# Patient Record
Sex: FEMALE | Race: WHITE | NOT HISPANIC OR LATINO | Employment: UNEMPLOYED | URBAN - METROPOLITAN AREA
[De-identification: names, ages, dates, MRNs, and addresses within clinical notes are randomized per-mention and may not be internally consistent; named-entity substitution may affect disease eponyms.]

---

## 2017-01-13 ENCOUNTER — APPOINTMENT (OUTPATIENT)
Dept: WOMENS IMAGING | Facility: HOSPITAL | Age: 57
End: 2017-01-13

## 2017-01-13 PROCEDURE — 77063 BREAST TOMOSYNTHESIS BI: CPT | Performed by: RADIOLOGY

## 2017-01-13 PROCEDURE — 77067 SCR MAMMO BI INCL CAD: CPT | Performed by: RADIOLOGY

## 2017-05-17 ENCOUNTER — CLINICAL SUPPORT NO REQUIREMENTS (OUTPATIENT)
Dept: CARDIOLOGY | Facility: CLINIC | Age: 57
End: 2017-05-17

## 2017-05-17 DIAGNOSIS — R00.1 SINUS BRADYCARDIA: Primary | ICD-10-CM

## 2017-05-17 PROCEDURE — 93294 REM INTERROG EVL PM/LDLS PM: CPT | Performed by: INTERNAL MEDICINE

## 2017-05-17 PROCEDURE — 93296 REM INTERROG EVL PM/IDS: CPT | Performed by: INTERNAL MEDICINE

## 2017-09-22 ENCOUNTER — OFFICE VISIT (OUTPATIENT)
Dept: CARDIOLOGY | Facility: CLINIC | Age: 57
End: 2017-09-22

## 2017-09-22 VITALS
SYSTOLIC BLOOD PRESSURE: 100 MMHG | WEIGHT: 135 LBS | HEART RATE: 72 BPM | DIASTOLIC BLOOD PRESSURE: 78 MMHG | BODY MASS INDEX: 21.19 KG/M2 | HEIGHT: 67 IN

## 2017-09-22 DIAGNOSIS — R07.9 CHEST PAIN, UNSPECIFIED TYPE: Primary | ICD-10-CM

## 2017-09-22 DIAGNOSIS — I10 ESSENTIAL HYPERTENSION: Chronic | ICD-10-CM

## 2017-09-22 DIAGNOSIS — R00.1 BRADYCARDIA: ICD-10-CM

## 2017-09-22 DIAGNOSIS — I47.1 SVT (SUPRAVENTRICULAR TACHYCARDIA) (HCC): ICD-10-CM

## 2017-09-22 PROCEDURE — 93288 INTERROG EVL PM/LDLS PM IP: CPT | Performed by: INTERNAL MEDICINE

## 2017-09-22 PROCEDURE — 99213 OFFICE O/P EST LOW 20 MIN: CPT | Performed by: INTERNAL MEDICINE

## 2017-09-22 RX ORDER — BUSPIRONE HYDROCHLORIDE 10 MG/1
10 TABLET ORAL 3 TIMES DAILY PRN
COMMUNITY

## 2017-09-22 RX ORDER — PROMETHAZINE HYDROCHLORIDE 25 MG/1
25 TABLET ORAL 2 TIMES DAILY PRN
Status: ON HOLD | COMMUNITY
End: 2020-04-01

## 2017-09-22 RX ORDER — GABAPENTIN 300 MG/1
300 CAPSULE ORAL 3 TIMES DAILY
COMMUNITY

## 2017-09-22 NOTE — PROGRESS NOTES
Lian Pearl  1960  [phone]  [phone]    09/22/2017    [facility] CARDIOLOGY     Jessi Epsteinraj Rowell DO  [address] [place] [id number]    Chief Complaint   Patient presents with   • Rapid Heart Rate       Problem List:    PROBLEM LIST:  1.  Tachypalpitations:  a. Status post EP study and catheter ablation, 09/18/2014  for typical atrial flutter and typical AV gisella reentrant tachycardia.    b. 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  c. Initiation of flecainide and  metoprolol therapy.  d. Normal LV function by recent cardiac stress test.   2.  Apparent reported symptomatic bradycardia on metoprolol therapy/status post dual chamber [place] permanent pacemaker implantation by Dr. Michael Mcknight in 2010.  3. Atypical chest pain:  a. Left heart catheterization 2005 revealing no significant coronary artery disease.  b. Left heart catheterization 2010 revealing normal coronaries.  EF 65%.  c. Cardiolite  stress test 2013 revealing thick small apical wall defect, normal LV function, EF 61%.   4.  Chronic migraine headaches/Topamax therapy.   5. History of pituitary adenoma/bromocriptine therapy.   6. Insomnia.   7. Diabetes.   8. Hypertension.   9. GERD.  10. Dyslipidemia.   11. Depression/anxiety.  12. Moderate obesity.   13. Sleep apnea with home  CPAP.   14. History of valvular heart disease.  a. Echocardiogram 2013 revealing  EF 60%. Mild left atrial enlargement, trace AI, trace MR, mild TR.     Allergies  No Known Allergies    Current Medications    Current Outpatient Prescriptions:   •  bromocriptine (PARLODEL) 5 MG capsule, Take 5 mg by mouth daily., Disp: , Rfl:   •  busPIRone (BUSPAR) 5 MG tablet, Take 5 mg by mouth 3 (Three) Times a Day., Disp: , Rfl:   •  clonazePAM (KlonoPIN) 0.5 MG tablet, Take 0.5 mg by mouth 2 (two) times a day as needed for seizures.,  "Disp: , Rfl:   •  furosemide (LASIX) 20 MG tablet, Take 20 mg by mouth daily., Disp: , Rfl:   •  gabapentin (NEURONTIN) 300 MG capsule, Take 300 mg by mouth 3 (Three) Times a Day., Disp: , Rfl:   •  isosorbide mononitrate (IMDUR) 30 MG 24 hr tablet, Take 30 mg by mouth daily., Disp: , Rfl:   •  lisinopril (PRINIVIL,ZESTRIL) 5 MG tablet, Take 5 mg by mouth daily., Disp: , Rfl:   •  metFORMIN (GLUCOPHAGE) 500 MG tablet, Take 500 mg by mouth 2 (two) times a day with meals., Disp: , Rfl:   •  metoprolol succinate XL (TOPROL-XL) 25 MG 24 hr tablet, Take 50 mg by mouth daily., Disp: , Rfl:   •  pravastatin (PRAVACHOL) 40 MG tablet, Take 40 mg by mouth daily., Disp: , Rfl:   •  promethazine (PHENERGAN) 25 MG tablet, Take 25 mg by mouth 2 (Two) Times a Day As Needed for Nausea or Vomiting., Disp: , Rfl:   •  ranitidine (ZANTAC) 150 MG tablet, Take 150 mg by mouth daily., Disp: , Rfl:   •  Topiramate  MG capsule extended-release 24 hour sprinkle, Take 150 mg by mouth 2 (two) times a day., Disp: , Rfl:     History of Present Illness   HPI    Pt presents for follow up of SVT/Bradycardia. Since the pt has seen us in clinic last, pt denies any syncope, LH, and dizziness. Denies any hospitalizations, ER visits, bleeding, or TIA/CVA symptoms. Overall feels ok. + rare palps +CP worse lasts minutes throbbing pain assoc with SOB    ROS:  General:  + fatigue, + wt loss being evaluated by [facility]  Cardiovascular:  Denies CP, PND, syncope, near syncope, edema or palpitations.  Pulmonary:  Denies MCGRAW, cough, or wheezing    Vitals:    09/22/17 1129   BP: 100/78   BP Location: Left arm   Patient Position: Sitting   Pulse: 72   Weight: 135 lb (61.2 kg)   Height: 67\" (170.2 cm)       PE:  General: NAD  Neck: no JVD, no carotid bruits, no TM  Heart RRR, NL S1, S2, S4 present, no rubs, murmurs  Lungs: CTA, no wheezes, rhonchi, or rales  Abd: soft, non-tender, NL BS  Ext: No musculoskeletal deformities, no edema, cyanosis, or " clubbing  Psych: normal mood and affect    Diagnostic Data:  Procedures      1. Chest pain, unspecified type    2. Bradycardia    3. SVT (supraventricular tachycardia)    4. Essential hypertension        PM Interrogation: NL PM fxn, Nl battery fxn, No AF     Plan:  1) CP: f/up w Dr Faustin for possible Cardiolyte  2) Bradycardia  Continue present medications. NL pacemaker function.  3) HTN  Wt loss, exercise, salt reduction    F/up in 12 months

## 2018-03-28 ENCOUNTER — CLINICAL SUPPORT NO REQUIREMENTS (OUTPATIENT)
Dept: CARDIOLOGY | Facility: CLINIC | Age: 58
End: 2018-03-28

## 2018-03-28 DIAGNOSIS — R00.1 SINUS BRADYCARDIA: ICD-10-CM

## 2018-03-28 PROCEDURE — 93296 REM INTERROG EVL PM/IDS: CPT | Performed by: INTERNAL MEDICINE

## 2018-03-28 PROCEDURE — 93294 REM INTERROG EVL PM/LDLS PM: CPT | Performed by: INTERNAL MEDICINE

## 2018-11-16 ENCOUNTER — OFFICE VISIT (OUTPATIENT)
Dept: CARDIOLOGY | Facility: CLINIC | Age: 58
End: 2018-11-16

## 2018-11-16 VITALS — SYSTOLIC BLOOD PRESSURE: 118 MMHG | OXYGEN SATURATION: 95 % | DIASTOLIC BLOOD PRESSURE: 70 MMHG | HEART RATE: 92 BPM

## 2018-11-16 DIAGNOSIS — R00.1 SINUS BRADYCARDIA: Primary | ICD-10-CM

## 2018-11-16 PROCEDURE — 93280 PM DEVICE PROGR EVAL DUAL: CPT | Performed by: INTERNAL MEDICINE

## 2018-11-16 PROCEDURE — 99213 OFFICE O/P EST LOW 20 MIN: CPT | Performed by: INTERNAL MEDICINE

## 2018-11-16 NOTE — PROGRESS NOTES
Lian Kang  1960  PCP: Jessi Melvin DO    SUBJECTIVE:   Lian Kang is a 58 y.o. female seen for a follow up visit regarding the following:     Chief Complaint: Follow up for bradycardia    HPI:    Since last visit the patient's status has been stable. No further CP or SOB. Hr Stable    History:       Cardiac PMH: (Old records have been reviewed and summarized below)  1.  Tachypalpitations:  a. Status post EP study and catheter ablation, 09/18/2014  for typical atrial flutter and typical AV gisella reentrant tachycardia.    b. 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  c. Initiation of flecainide and  metoprolol therapy.  d. Normal LV function by recent cardiac stress test.   2.  Apparent reported symptomatic bradycardia on metoprolol therapy/status post dual chamber [place] permanent pacemaker implantation by Dr. Michael Mcknight in 2010.  3. Atypical chest pain:  a. Left heart catheterization 2005 revealing no significant coronary artery disease.  b. Left heart catheterization 2010 revealing normal coronaries.  EF 65%.  c. Cardiolite  stress test 2013 revealing thick small apical wall defect, normal LV function, EF 61%.   4.  Chronic migraine headaches/Topamax therapy.   5. History of pituitary adenoma/bromocriptine therapy.   6. Insomnia.   7. Diabetes.   8. Hypertension.   9. GERD.  10. Dyslipidemia.   11. Depression/anxiety.  12. Moderate obesity.   13. Sleep apnea with home  CPAP.   14. History of valvular heart disease.  a. Echocardiogram 2013 revealing  EF 60%. Mild left atrial enlargement, trace AI, trace MR, mild TR.          Current Outpatient Medications:   •  bromocriptine (PARLODEL) 5 MG capsule, Take 5 mg by mouth daily., Disp: , Rfl:   •  busPIRone (BUSPAR) 5 MG tablet, Take 5 mg by mouth 3 (Three) Times a Day., Disp: , Rfl:   •  clonazePAM (KlonoPIN) 0.5 MG tablet, Take 0.5 mg by  mouth 2 (two) times a day as needed for seizures., Disp: , Rfl:   •  furosemide (LASIX) 20 MG tablet, Take 20 mg by mouth daily., Disp: , Rfl:   •  gabapentin (NEURONTIN) 300 MG capsule, Take 300 mg by mouth 3 (Three) Times a Day., Disp: , Rfl:   •  isosorbide mononitrate (IMDUR) 30 MG 24 hr tablet, Take 30 mg by mouth daily., Disp: , Rfl:   •  lisinopril (PRINIVIL,ZESTRIL) 5 MG tablet, Take 5 mg by mouth daily., Disp: , Rfl:   •  metFORMIN (GLUCOPHAGE) 500 MG tablet, Take 500 mg by mouth Daily With Breakfast., Disp: , Rfl:   •  metoprolol succinate XL (TOPROL-XL) 25 MG 24 hr tablet, Take 50 mg by mouth daily., Disp: , Rfl:   •  pravastatin (PRAVACHOL) 40 MG tablet, Take 40 mg by mouth daily., Disp: , Rfl:   •  promethazine (PHENERGAN) 25 MG tablet, Take 25 mg by mouth 2 (Two) Times a Day As Needed for Nausea or Vomiting., Disp: , Rfl:   •  ranitidine (ZANTAC) 150 MG tablet, Take 150 mg by mouth daily., Disp: , Rfl:   •  Topiramate  MG capsule extended-release 24 hour sprinkle, Take 150 mg by mouth 2 (two) times a day., Disp: , Rfl:     Past Medical History, Past Surgical History, Family history, Social History, and Medications were all reviewed with the patient today and updated as necessary.       Patient Active Problem List   Diagnosis   • SVT (supraventricular tachycardia) (CMS/HCC)   • Sinus bradycardia   • H/O valvular heart disease   • AMANDEEP on CPAP   • Moderate obesity   • Anxiety and depression   • Dyslipidemia   • GERD (gastroesophageal reflux disease)   • Hypertension   • Diabetes mellitus (CMS/HCC)   • Insomnia   • Migraine headache   • Atypical chest pain     No Known Allergies  Past Medical History:   Diagnosis Date   • Anxiety and depression    • Atypical chest pain    • Diabetes mellitus (CMS/HCC)    • Dyslipidemia    • GERD (gastroesophageal reflux disease)    • H/O valvular heart disease     Echocardiogram 2013 revealing EF 60%. Mild left atrial enlargement, trace AI, trace MR, mild TR.   •  Hypertension    • Insomnia    • Migraine headache     Chronic migraine headaches/Topamax therapy.    • Moderate obesity    • AMANDEEP on CPAP    • Pituitary adenoma (CMS/HCC)     History of pituitary adenoma/bromocriptine therapy.    • Sinus bradycardia     Severe symptomatic bradycardia secondary to sinus node dysfunction and sinus bradycardia with pacemaker at elective replacement indicator.   • SVT (supraventricular tachycardia) (CMS/HCC)     With prior ablation.     Past Surgical History:   Procedure Laterality Date   • PACEMAKER IMPLANTATION  2010    Apparent reported symptomatic bradycardia on metoprolol therapy/status post dual chamber [place] permanent pacemaker implantation by Dr. Michael Mcknight in 2010.     History reviewed. No pertinent family history.  Social History     Tobacco Use   • Smoking status: Never Smoker   Substance Use Topics   • Alcohol use: No         PHYSICAL EXAM:    /70 (BP Location: Right arm, Patient Position: Sitting)   Pulse 92   SpO2 95%        Wt Readings from Last 5 Encounters:   09/22/17 61.2 kg (135 lb)   09/16/16 77.1 kg (170 lb)       BP Readings from Last 5 Encounters:   11/16/18 118/70   09/22/17 100/78   09/16/16 123/75       General-Well Nourished, Well developed  Eyes - PERRLA  Neck- supple, No mass  CV- regular rate and rhythm, no MRG, No edema  Lung- clear bilaterally  Abd- soft, +BS  Musc/skel - Norm strength and range of motion  Skin- warm and dry  Neuro - Alert & Oriented x 3, appropriate mood.        Medical problems and test results were reviewed with the patient today.     No results found for this or any previous visit (from the past 672 hour(s)).      EKG: (EKG has been independently visualized by me and summarized below)    Procedures     ASSESSMENT and PLAN  1.  Bradycardia - resolved post pacemaker  2.  Pacemaker-stable function        Return in about 1 year (around 11/16/2019) for office visit and device check with [place].        Gurdeep Borja,  M.D., GEOVANY, F.H.DEOS.  Cardiology/Electrophysiology  11/16/2018  11:36 AM

## 2019-01-15 ENCOUNTER — CLINICAL SUPPORT NO REQUIREMENTS (OUTPATIENT)
Dept: CARDIOLOGY | Facility: CLINIC | Age: 59
End: 2019-01-15

## 2019-01-15 DIAGNOSIS — R00.1 SINUS BRADYCARDIA: ICD-10-CM

## 2019-01-15 PROCEDURE — 93294 REM INTERROG EVL PM/LDLS PM: CPT | Performed by: INTERNAL MEDICINE

## 2019-01-15 PROCEDURE — 93296 REM INTERROG EVL PM/IDS: CPT | Performed by: INTERNAL MEDICINE

## 2019-04-16 ENCOUNTER — CLINICAL SUPPORT NO REQUIREMENTS (OUTPATIENT)
Dept: CARDIOLOGY | Facility: CLINIC | Age: 59
End: 2019-04-16

## 2019-04-16 DIAGNOSIS — R00.1 SINUS BRADYCARDIA: ICD-10-CM

## 2019-04-16 PROCEDURE — 93296 REM INTERROG EVL PM/IDS: CPT | Performed by: INTERNAL MEDICINE

## 2019-04-16 PROCEDURE — 93294 REM INTERROG EVL PM/LDLS PM: CPT | Performed by: INTERNAL MEDICINE

## 2019-07-16 ENCOUNTER — CLINICAL SUPPORT NO REQUIREMENTS (OUTPATIENT)
Dept: CARDIOLOGY | Facility: CLINIC | Age: 59
End: 2019-07-16

## 2019-07-16 DIAGNOSIS — R00.1 SINUS BRADYCARDIA: Primary | ICD-10-CM

## 2019-07-16 PROCEDURE — 93296 REM INTERROG EVL PM/IDS: CPT | Performed by: INTERNAL MEDICINE

## 2019-07-16 PROCEDURE — 93294 REM INTERROG EVL PM/LDLS PM: CPT | Performed by: INTERNAL MEDICINE

## 2019-11-15 ENCOUNTER — OFFICE VISIT (OUTPATIENT)
Dept: CARDIOLOGY | Facility: CLINIC | Age: 59
End: 2019-11-15

## 2019-11-15 VITALS
HEART RATE: 76 BPM | BODY MASS INDEX: 28.25 KG/M2 | SYSTOLIC BLOOD PRESSURE: 86 MMHG | HEIGHT: 67 IN | WEIGHT: 180 LBS | DIASTOLIC BLOOD PRESSURE: 60 MMHG

## 2019-11-15 DIAGNOSIS — I10 ESSENTIAL HYPERTENSION: Chronic | ICD-10-CM

## 2019-11-15 DIAGNOSIS — R00.1 SINUS BRADYCARDIA: Primary | ICD-10-CM

## 2019-11-15 PROCEDURE — 99214 OFFICE O/P EST MOD 30 MIN: CPT | Performed by: INTERNAL MEDICINE

## 2019-11-15 RX ORDER — HYDROCODONE BITARTRATE AND ACETAMINOPHEN 5; 325 MG/1; MG/1
1 TABLET ORAL EVERY 6 HOURS PRN
COMMUNITY

## 2019-11-15 NOTE — PROGRESS NOTES
Lian Kang  1960  PCP: Jessi Melvin DO    SUBJECTIVE:   Lian Kang is a 59 y.o. female seen for a follow up visit regarding the following:     Chief Complaint: Follow up for bradycardia    HPI:    Since last visit the patient's status has been stable. No further CP or SOB. Poor HR response. Some dizziness with hypotension    History:       Cardiac PMH: (Old records have been reviewed and summarized below)  1.  Tachypalpitations:  a. Status post EP study and catheter ablation, 09/18/2014  for typical atrial flutter and typical AV gisella reentrant tachycardia.    b. 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  c. Initiation of flecainide and  metoprolol therapy.  d. Normal LV function by recent cardiac stress test.   2.  Apparent reported symptomatic bradycardia on metoprolol therapy/status post dual chamber [place] permanent pacemaker implantation by Dr. Michael Mcknight in 2010.  3. Atypical chest pain:  a. Left heart catheterization 2005 revealing no significant coronary artery disease.  b. Left heart catheterization 2010 revealing normal coronaries.  EF 65%.  c. Cardiolite  stress test 2013 revealing thick small apical wall defect, normal LV function, EF 61%.   4.  Chronic migraine headaches/Topamax therapy.   5. History of pituitary adenoma/bromocriptine therapy.   6. Insomnia.   7. Diabetes.   8. Hypertension.   9. GERD.  10. Dyslipidemia.   11. Depression/anxiety.  12. Moderate obesity.   13. Sleep apnea with home  CPAP.   14. History of valvular heart disease.  a. Echocardiogram 2013 revealing  EF 60%. Mild left atrial enlargement, trace AI, trace MR, mild TR.          Current Outpatient Medications:   •  busPIRone (BUSPAR) 5 MG tablet, Take 5 mg by mouth 3 (Three) Times a Day., Disp: , Rfl:   •  clonazePAM (KlonoPIN) 0.5 MG tablet, Take 0.5 mg by mouth 2 (two) times a day as needed for  seizures., Disp: , Rfl:   •  furosemide (LASIX) 20 MG tablet, Take 20 mg by mouth daily., Disp: , Rfl:   •  gabapentin (NEURONTIN) 300 MG capsule, Take 300 mg by mouth 3 (Three) Times a Day., Disp: , Rfl:   •  HYDROcodone-acetaminophen (NORCO) 5-325 MG per tablet, Take 1 tablet by mouth Every 6 (Six) Hours As Needed., Disp: , Rfl:   •  isosorbide mononitrate (IMDUR) 30 MG 24 hr tablet, Take 30 mg by mouth daily., Disp: , Rfl:   •  lisinopril (PRINIVIL,ZESTRIL) 5 MG tablet, Take 5 mg by mouth daily., Disp: , Rfl:   •  metFORMIN (GLUCOPHAGE) 500 MG tablet, Take 500 mg by mouth Daily With Breakfast., Disp: , Rfl:   •  metoprolol succinate XL (TOPROL-XL) 50 MG 24 hr tablet, Take 50 mg by mouth Daily., Disp: , Rfl:   •  pravastatin (PRAVACHOL) 40 MG tablet, Take 40 mg by mouth daily., Disp: , Rfl:   •  promethazine (PHENERGAN) 25 MG tablet, Take 25 mg by mouth 2 (Two) Times a Day As Needed for Nausea or Vomiting., Disp: , Rfl:   •  Topiramate  MG capsule extended-release 24 hour sprinkle, Take 150 mg by mouth 2 (two) times a day., Disp: , Rfl:     Past Medical History, Past Surgical History, Family history, Social History, and Medications were all reviewed with the patient today and updated as necessary.       Patient Active Problem List   Diagnosis   • SVT (supraventricular tachycardia) (CMS/HCC)   • Sinus bradycardia   • H/O valvular heart disease   • AMANDEEP on CPAP   • Moderate obesity   • Anxiety and depression   • Dyslipidemia   • GERD (gastroesophageal reflux disease)   • Hypertension   • Diabetes mellitus (CMS/HCC)   • Insomnia   • Migraine headache   • Atypical chest pain     No Known Allergies  Past Medical History:   Diagnosis Date   • Anxiety and depression    • Atypical chest pain    • Diabetes mellitus (CMS/HCC)    • Dyslipidemia    • GERD (gastroesophageal reflux disease)    • H/O valvular heart disease     Echocardiogram 2013 revealing EF 60%. Mild left atrial enlargement, trace AI, trace MR, mild TR.  "  • Hypertension    • Insomnia    • Migraine headache     Chronic migraine headaches/Topamax therapy.    • Moderate obesity    • AMANDEEP on CPAP    • Pituitary adenoma (CMS/HCC)     History of pituitary adenoma/bromocriptine therapy.    • Sinus bradycardia     Severe symptomatic bradycardia secondary to sinus node dysfunction and sinus bradycardia with pacemaker at elective replacement indicator.   • SVT (supraventricular tachycardia) (CMS/HCC)     With prior ablation.     Past Surgical History:   Procedure Laterality Date   • PACEMAKER IMPLANTATION  2010    Apparent reported symptomatic bradycardia on metoprolol therapy/status post dual chamber [place] permanent pacemaker implantation by Dr. Michael Mcknight in 2010.     Family History   Problem Relation Age of Onset   • Heart disease Mother    • Heart failure Mother    • Heart disease Father    • Heart failure Father      Social History     Tobacco Use   • Smoking status: Never Smoker   • Smokeless tobacco: Never Used   Substance Use Topics   • Alcohol use: No         PHYSICAL EXAM:    BP (!) 86/60 (BP Location: Right arm, Patient Position: Sitting)   Pulse 76   Ht 170.2 cm (67\")   Wt 81.6 kg (180 lb)   BMI 28.19 kg/m²        Wt Readings from Last 5 Encounters:   11/15/19 81.6 kg (180 lb)   09/22/17 61.2 kg (135 lb)   09/16/16 77.1 kg (170 lb)       BP Readings from Last 5 Encounters:   11/15/19 (!) 86/60   11/16/18 118/70   09/22/17 100/78   09/16/16 123/75       General-Well Nourished, Well developed  Eyes - PERRLA  Neck- supple, No mass  CV- regular rate and rhythm, no MRG, No edema  Lung- clear bilaterally  Abd- soft, +BS  Musc/skel - Norm strength and range of motion  Skin- warm and dry  Neuro - Alert & Oriented x 3, appropriate mood.        Medical problems and test results were reviewed with the patient today.     No results found for this or any previous visit (from the past 672 hour(s)).      EKG: (EKG has been independently visualized by me and summarized " below)    Procedures     ASSESSMENT and PLAN  1.  Bradycardia - resolved post pacemaker - Adjusted Rate response  2.  Pacemaker-stable function  3.  HTN - Now with periods of hypotension - Decrease metoprolol XL to 25        Return in about 6 months (around 5/15/2020) for office visit and device check with [place].        Gurdeep Borja M.D., [place]OV[place], F.H.R.S.  Cardiology/Electrophysiology  11/15/2019  10:23 AM

## 2020-02-03 ENCOUNTER — OFFICE VISIT (OUTPATIENT)
Dept: CARDIOLOGY | Facility: CLINIC | Age: 60
End: 2020-02-03

## 2020-02-03 VITALS
OXYGEN SATURATION: 98 % | HEIGHT: 67 IN | DIASTOLIC BLOOD PRESSURE: 71 MMHG | HEART RATE: 70 BPM | SYSTOLIC BLOOD PRESSURE: 109 MMHG | BODY MASS INDEX: 28.5 KG/M2 | WEIGHT: 181.6 LBS

## 2020-02-03 DIAGNOSIS — I10 ESSENTIAL HYPERTENSION: ICD-10-CM

## 2020-02-03 DIAGNOSIS — Z95.0 PRESENCE OF CARDIAC PACEMAKER: ICD-10-CM

## 2020-02-03 DIAGNOSIS — I48.0 AF (PAROXYSMAL ATRIAL FIBRILLATION) (HCC): ICD-10-CM

## 2020-02-03 DIAGNOSIS — E78.2 MIXED HYPERLIPIDEMIA: ICD-10-CM

## 2020-02-03 DIAGNOSIS — R55 SYNCOPE AND COLLAPSE: Primary | ICD-10-CM

## 2020-02-03 PROCEDURE — 93000 ELECTROCARDIOGRAM COMPLETE: CPT | Performed by: SPECIALIST

## 2020-02-03 PROCEDURE — 99214 OFFICE O/P EST MOD 30 MIN: CPT | Performed by: SPECIALIST

## 2020-02-03 RX ORDER — DOCUSATE SODIUM 100 MG/1
100 CAPSULE, LIQUID FILLED ORAL 2 TIMES DAILY PRN
COMMUNITY

## 2020-02-03 RX ORDER — TRAZODONE HYDROCHLORIDE 50 MG/1
50 TABLET ORAL 2 TIMES DAILY
COMMUNITY

## 2020-02-03 RX ORDER — FLECAINIDE ACETATE 50 MG/1
50 TABLET ORAL EVERY 12 HOURS
COMMUNITY
End: 2020-02-17

## 2020-02-03 RX ORDER — SERTRALINE HYDROCHLORIDE 100 MG/1
100 TABLET, FILM COATED ORAL DAILY
COMMUNITY
End: 2020-03-23 | Stop reason: SDUPTHER

## 2020-02-03 RX ORDER — PANTOPRAZOLE SODIUM 40 MG/1
40 TABLET, DELAYED RELEASE ORAL DAILY
COMMUNITY

## 2020-02-03 RX ORDER — FLUTICASONE PROPIONATE 50 MCG
2 SPRAY, SUSPENSION (ML) NASAL DAILY
COMMUNITY

## 2020-02-03 RX ORDER — ROSUVASTATIN CALCIUM 20 MG/1
20 TABLET, COATED ORAL DAILY
COMMUNITY
End: 2020-03-04

## 2020-02-03 RX ORDER — BROMOCRIPTINE MESYLATE 2.5 MG/1
2.5 TABLET ORAL DAILY
COMMUNITY

## 2020-02-03 RX ORDER — TIZANIDINE 4 MG/1
4 TABLET ORAL NIGHTLY PRN
Status: ON HOLD | COMMUNITY
End: 2020-04-01

## 2020-02-03 RX ORDER — METHOCARBAMOL 500 MG/1
500 TABLET, FILM COATED ORAL 3 TIMES DAILY PRN
COMMUNITY

## 2020-02-03 NOTE — PROGRESS NOTES
Subjective   Follow up, syncopal episodes  Lian Kang is a 59 y.o. female who presents to day for Follow-up; Med Management (med list. ); Shortness of Breath; Edema; Dizziness; Syncope; and Palpitations.    CHIEF COMPLIANT  Chief Complaint   Patient presents with   • Follow-up   • Med Management     med list.    • Shortness of Breath   • Edema   • Dizziness   • Syncope   • Palpitations       Active Problems:  Problem List Items Addressed This Visit        Cardiovascular and Mediastinum    Syncope and collapse - Primary    Relevant Orders    ECG 12 Lead    Cardiac Event Monitor    Adult Transthoracic Echo Complete W/ Cont if Necessary Per Protocol    Presence of cardiac pacemaker    Essential hypertension    Mixed hyperlipidemia    Relevant Medications    rosuvastatin (CRESTOR) 20 MG tablet    AF (paroxysmal atrial fibrillation) (CMS/[facility])          HPI  HPI  Syncope, started last December, first episodes happened while she was standing, suddenly felt dizzy, did not completely pass out, did not fall to ground, but was not responding to the family, she could hear them but not able to response, paramedics were called, and told her that her blood pressure was low, she was seen at the ER, Since still having episodes of brief severe dizziness, all happening while standing, she has been standing for at least 10 minutes, in all the episode she can here family but not able to respond, no falls, she had a CT scan at the ER to rule out PE, and was told it was ok, episodes of dizziness can last up to 10 minutes, with bad headaches, after the episodes, no loss of sphincter control, no palpitations, no chest pain, intermittent edema, blood pressure is a little labile at home  PRIOR MEDS  Current Outpatient Medications on File Prior to Visit   Medication Sig Dispense Refill   • bromocriptine (PARLODEL) 2.5 MG tablet Take 2.5 mg by mouth Daily.     • busPIRone (BUSPAR) 10 MG tablet Take 10 mg by mouth 3 (Three) Times a Day.     •  clonazePAM (KlonoPIN) 0.5 MG tablet Take 0.5 mg by mouth 2 (two) times a day as needed for seizures.     • docusate sodium (COLACE) 100 MG capsule Take 100 mg by mouth 2 (Two) Times a Day.     • flecainide (TAMBOCOR) 50 MG tablet Take 50 mg by mouth Every 12 (Twelve) Hours.     • fluticasone (FLONASE) 50 MCG/ACT nasal spray 2 sprays into the nostril(s) as directed by provider Daily.     • furosemide (LASIX) 20 MG tablet Take 20 mg by mouth daily.     • gabapentin (NEURONTIN) 300 MG capsule Take 300 mg by mouth 3 (Three) Times a Day.     • HYDROcodone-acetaminophen (NORCO) 5-325 MG per tablet Take 1 tablet by mouth Every 6 (Six) Hours As Needed.     • isosorbide mononitrate (IMDUR) 30 MG 24 hr tablet Take 30 mg by mouth daily.     • LEVALBUTEROL HCL IN Inhale.     • lisinopril (PRINIVIL,ZESTRIL) 2.5 MG tablet Take 2.5 mg by mouth Daily.     • metFORMIN (GLUCOPHAGE) 500 MG tablet Take 500 mg by mouth Daily With Breakfast.     • methocarbamol (ROBAXIN) 500 MG tablet Take 500 mg by mouth 3 (Three) Times a Day.     • pantoprazole (PROTONIX) 40 MG EC tablet Take 40 mg by mouth Daily.     • promethazine (PHENERGAN) 25 MG tablet Take 25 mg by mouth 2 (Two) Times a Day As Needed for Nausea or Vomiting.     • rosuvastatin (CRESTOR) 20 MG tablet Take 20 mg by mouth Daily.     • sertraline (ZOLOFT) 100 MG tablet Take 100 mg by mouth Daily.     • tiZANidine (ZANAFLEX) 4 MG tablet Take 4 mg by mouth At Night As Needed for Muscle Spasms.     • Topiramate  MG capsule extended-release 24 hour sprinkle Take 100 mg by mouth 2 (Two) Times a Day.     • traZODone (DESYREL) 50 MG tablet Take 50 mg by mouth Every Night.     • Umeclidinium Bromide (INCRUSE ELLIPTA) 62.5 MCG/INH aerosol powder  Inhale.     • [DISCONTINUED] metoprolol succinate XL (TOPROL-XL) 50 MG 24 hr tablet Take 25 mg by mouth Daily.     • [DISCONTINUED] pravastatin (PRAVACHOL) 40 MG tablet Take 40 mg by mouth daily.       No current facility-administered  medications on file prior to visit.        ALLERGIES  Patient has no known allergies.    HISTORY  Past Medical History:   Diagnosis Date   • Anxiety and depression    • Atypical chest pain    • Diabetes mellitus (CMS/HCC)    • Dyslipidemia    • GERD (gastroesophageal reflux disease)    • H/O valvular heart disease     Echocardiogram 2013 revealing EF 60%. Mild left atrial enlargement, trace AI, trace MR, mild TR.   • Hypertension    • Insomnia    • Migraine headache     Chronic migraine headaches/Topamax therapy.    • Moderate obesity    • AMANDEEP on CPAP    • Pituitary adenoma (CMS/HCC)     History of pituitary adenoma/bromocriptine therapy.    • Sinus bradycardia     Severe symptomatic bradycardia secondary to sinus node dysfunction and sinus bradycardia with pacemaker at elective replacement indicator.   • SVT (supraventricular tachycardia) (CMS/HCC)     With prior ablation.       Social History     Socioeconomic History   • Marital status:      Spouse name: Not on file   • Number of children: Not on file   • Years of education: Not on file   • Highest education level: Not on file   Tobacco Use   • Smoking status: Never Smoker   • Smokeless tobacco: Never Used   Substance and Sexual Activity   • Alcohol use: No   • Drug use: No   • Sexual activity: Defer       Family History   Problem Relation Age of Onset   • Heart disease Mother    • Heart failure Mother    • Heart disease Father    • Heart failure Father        Review of Systems   Respiratory: Positive for shortness of breath.    Cardiovascular: Positive for palpitations and leg swelling. Negative for chest pain.   Gastrointestinal: Negative for abdominal distention, abdominal pain and anal bleeding.   Endocrine: Negative for cold intolerance, heat intolerance and polydipsia.   Genitourinary: Negative for difficulty urinating and flank pain.   Skin: Negative for color change.   Allergic/Immunologic: Negative for food allergies.   Neurological: Positive for  "dizziness.   Hematological: Negative for adenopathy.   Psychiatric/Behavioral: Negative for agitation and hallucinations.       Objective     VITALS: /71 (BP Location: Left arm, Patient Position: Sitting, Cuff Size: Adult)   Pulse 70   Ht 170.2 cm (67\")   Wt 82.4 kg (181 lb 9.6 oz)   SpO2 98%   BMI 28.44 kg/m²     LABS:   Lab Results (most recent)     None          IMAGING:   No Images in the past 120 days found..    EXAM:  Physical Exam   Constitutional: She is oriented to person, place, and time. She appears well-developed and well-nourished.   HENT:   Head: Normocephalic and atraumatic.   Eyes: Pupils are equal, round, and reactive to light.   Neck: Neck supple. No JVD present. No thyromegaly present.   Cardiovascular: Normal rate, regular rhythm, normal heart sounds and intact distal pulses. Exam reveals no gallop and no friction rub.   No murmur heard.  Pacermaker in situ   Pulmonary/Chest: Effort normal and breath sounds normal. No stridor. No respiratory distress. She has no wheezes. She has no rales. She exhibits no tenderness.   Musculoskeletal: She exhibits no edema, tenderness or deformity.   Neurological: She is alert and oriented to person, place, and time. No cranial nerve deficit. Coordination normal.   Skin: Skin is warm and dry.   Psychiatric: She has a normal mood and affect.   Vitals reviewed.      Procedure     ECG 12 Lead  Date/Time: 2/3/2020 1:02 PM  Performed by: Anthony Faustin MD  Authorized by: Anthony Faustin MD           EKG; Paced atrial responses, with diffuse nonspecific ST changes, no previous EKG available for comparison       Assessment/Plan    Diagnosis Plan   1. Syncope and collapse  ECG 12 Lead    Cardiac Event Monitor    Adult Transthoracic Echo Complete W/ Cont if Necessary Per Protocol   2. Mixed hyperlipidemia     3. Essential hypertension     4. Presence of cardiac pacemaker     5. AF (paroxysmal atrial fibrillation) (CMS/HCC)       1. Will get event monitor, will " repeat echocariogram for assessment, consider tilt table testing, also consider neurological assessment  2. Blood pressure is acceptable at the moment, will monitor  3. Will continue holding beta blockers  4. She has not been taking her anticoagulant, will restart  Return in about 2 weeks (around 2/17/2020).    Lian was seen today for follow-up, med management, shortness of breath, edema, dizziness, syncope and palpitations.    Diagnoses and all orders for this visit:    Syncope and collapse  -     ECG 12 Lead  -     Cardiac Event Monitor; Future  -     Adult Transthoracic Echo Complete W/ Cont if Necessary Per Protocol; Future    Mixed hyperlipidemia    Essential hypertension    Presence of cardiac pacemaker    AF (paroxysmal atrial fibrillation) (CMS/[facility])               MEDS ORDERED DURING VISIT:  No orders of the defined types were placed in this encounter.        This document has been electronically signed by Anthony Faustin MD  February 3, 2020 2:15 PM

## 2020-02-04 ENCOUNTER — TELEPHONE (OUTPATIENT)
Dept: CARDIOLOGY | Facility: CLINIC | Age: 60
End: 2020-02-04

## 2020-02-04 NOTE — TELEPHONE ENCOUNTER
Called patient event monitor did not require an auth. She stated she would like to come on Monday to pick her monitor.   
Ophthalmology
no

## 2020-02-10 ENCOUNTER — TELEPHONE (OUTPATIENT)
Dept: CARDIOLOGY | Facility: CLINIC | Age: 60
End: 2020-02-10

## 2020-02-11 PROCEDURE — 93270 REMOTE 30 DAY ECG REV/REPORT: CPT | Performed by: SPECIALIST

## 2020-02-12 ENCOUNTER — TELEPHONE (OUTPATIENT)
Dept: CARDIOLOGY | Facility: CLINIC | Age: 60
End: 2020-02-12

## 2020-02-12 NOTE — TELEPHONE ENCOUNTER
Spoke with Dr. Faustin @ 2:52 pm, he states he has reviewed the reading. No recommendations or changes given at this time.

## 2020-02-12 NOTE — TELEPHONE ENCOUNTER
2 additional readings received from [employer]. Will forward to provider for review. Readings scanned to encounter.

## 2020-02-14 ENCOUNTER — APPOINTMENT (OUTPATIENT)
Dept: CARDIOLOGY | Facility: HOSPITAL | Age: 60
End: 2020-02-14

## 2020-02-17 ENCOUNTER — OFFICE VISIT (OUTPATIENT)
Dept: CARDIOLOGY | Facility: CLINIC | Age: 60
End: 2020-02-17

## 2020-02-17 ENCOUNTER — HOSPITAL ENCOUNTER (OUTPATIENT)
Dept: CARDIOLOGY | Facility: HOSPITAL | Age: 60
Discharge: HOME | End: 2020-02-17
Admitting: SPECIALIST

## 2020-02-17 VITALS
SYSTOLIC BLOOD PRESSURE: 116 MMHG | HEIGHT: 67 IN | BODY MASS INDEX: 28.41 KG/M2 | WEIGHT: 181 LBS | OXYGEN SATURATION: 98 % | DIASTOLIC BLOOD PRESSURE: 77 MMHG | HEART RATE: 104 BPM

## 2020-02-17 DIAGNOSIS — I48.0 PAROXYSMAL ATRIAL FIBRILLATION (HCC): ICD-10-CM

## 2020-02-17 DIAGNOSIS — R06.02 SHORTNESS OF BREATH: Primary | ICD-10-CM

## 2020-02-17 DIAGNOSIS — Z95.0 PRESENCE OF CARDIAC PACEMAKER: ICD-10-CM

## 2020-02-17 DIAGNOSIS — R55 SYNCOPE AND COLLAPSE: ICD-10-CM

## 2020-02-17 DIAGNOSIS — E78.2 MIXED HYPERLIPIDEMIA: ICD-10-CM

## 2020-02-17 LAB
BH CV ECHO MEAS - % IVS THICK: 108.3 %
BH CV ECHO MEAS - % LVPW THICK: 69.7 %
BH CV ECHO MEAS - ACS: 2 CM
BH CV ECHO MEAS - AO ROOT AREA (BSA CORRECTED): 1.5
BH CV ECHO MEAS - AO ROOT AREA: 7.1 CM^2
BH CV ECHO MEAS - AO ROOT DIAM: 3 CM
BH CV ECHO MEAS - BSA(HAYCOCK): 2 M^2
BH CV ECHO MEAS - BSA: 1.9 M^2
BH CV ECHO MEAS - BZI_BMI: 28.3 KILOGRAMS/M^2
BH CV ECHO MEAS - BZI_METRIC_HEIGHT: 170.2 CM
BH CV ECHO MEAS - BZI_METRIC_WEIGHT: 82.1 KG
BH CV ECHO MEAS - EDV(CUBED): 112.3 ML
BH CV ECHO MEAS - EDV(MOD-SP4): 48.9 ML
BH CV ECHO MEAS - EDV(TEICH): 108.8 ML
BH CV ECHO MEAS - EF(CUBED): 76.9 %
BH CV ECHO MEAS - EF(MOD-BP): 47 %
BH CV ECHO MEAS - EF(MOD-SP4): 46.8 %
BH CV ECHO MEAS - EF(TEICH): 68.9 %
BH CV ECHO MEAS - ESV(CUBED): 25.9 ML
BH CV ECHO MEAS - ESV(MOD-SP4): 26 ML
BH CV ECHO MEAS - ESV(TEICH): 33.9 ML
BH CV ECHO MEAS - FS: 38.7 %
BH CV ECHO MEAS - IVS/LVPW: 1
BH CV ECHO MEAS - IVSD: 0.85 CM
BH CV ECHO MEAS - IVSS: 1.8 CM
BH CV ECHO MEAS - LA DIMENSION: 4 CM
BH CV ECHO MEAS - LA/AO: 1.3
BH CV ECHO MEAS - LV DIASTOLIC VOL/BSA (35-75): 25.2 ML/M^2
BH CV ECHO MEAS - LV MASS(C)D: 137 GRAMS
BH CV ECHO MEAS - LV MASS(C)DI: 70.7 GRAMS/M^2
BH CV ECHO MEAS - LV MASS(C)S: 172.6 GRAMS
BH CV ECHO MEAS - LV MASS(C)SI: 89 GRAMS/M^2
BH CV ECHO MEAS - LV SYSTOLIC VOL/BSA (12-30): 13.4 ML/M^2
BH CV ECHO MEAS - LVIDD: 4.8 CM
BH CV ECHO MEAS - LVIDS: 3 CM
BH CV ECHO MEAS - LVLD AP4: 7.2 CM
BH CV ECHO MEAS - LVLS AP4: 6.3 CM
BH CV ECHO MEAS - LVOT AREA (M): 3.1 CM^2
BH CV ECHO MEAS - LVOT AREA: 3.1 CM^2
BH CV ECHO MEAS - LVOT DIAM: 2 CM
BH CV ECHO MEAS - LVPWD: 0.84 CM
BH CV ECHO MEAS - LVPWS: 1.4 CM
BH CV ECHO MEAS - MV A MAX VEL: 64.7 CM/SEC
BH CV ECHO MEAS - MV E MAX VEL: 73.7 CM/SEC
BH CV ECHO MEAS - MV E/A: 1.1
BH CV ECHO MEAS - PA ACC TIME: 0.13 SEC
BH CV ECHO MEAS - PA PR(ACCEL): 18.7 MMHG
BH CV ECHO MEAS - RAP SYSTOLE: 10 MMHG
BH CV ECHO MEAS - RVDD: 2.2 CM
BH CV ECHO MEAS - RVSP: 26.5 MMHG
BH CV ECHO MEAS - SI(CUBED): 44.6 ML/M^2
BH CV ECHO MEAS - SI(MOD-SP4): 11.8 ML/M^2
BH CV ECHO MEAS - SI(TEICH): 38.7 ML/M^2
BH CV ECHO MEAS - SV(CUBED): 86.4 ML
BH CV ECHO MEAS - SV(MOD-SP4): 22.9 ML
BH CV ECHO MEAS - SV(TEICH): 75 ML
BH CV ECHO MEAS - TR MAX VEL: 203 CM/SEC
MAXIMAL PREDICTED HEART RATE: 160 BPM
STRESS TARGET HR: 136 BPM

## 2020-02-17 PROCEDURE — 93306 TTE W/DOPPLER COMPLETE: CPT

## 2020-02-17 PROCEDURE — 93306 TTE W/DOPPLER COMPLETE: CPT | Performed by: SPECIALIST

## 2020-02-17 PROCEDURE — 99214 OFFICE O/P EST MOD 30 MIN: CPT | Performed by: SPECIALIST

## 2020-02-17 RX ORDER — CARVEDILOL 6.25 MG/1
6.25 TABLET ORAL 2 TIMES DAILY
Qty: 180 TABLET | Refills: 3 | Status: SHIPPED | OUTPATIENT
Start: 2020-02-17 | End: 2020-04-16

## 2020-02-17 NOTE — PROGRESS NOTES
Subjective   Follow-up today echocardiogram  Lian Kang is a 59 y.o. female who presents to day for Results (echo).    CHIEF COMPLIANT  Chief Complaint   Patient presents with   • Results     echo       Active Problems:  Problem List Items Addressed This Visit        Cardiovascular and Mediastinum    Presence of cardiac pacemaker    Mixed hyperlipidemia      Other Visit Diagnoses     Shortness of breath    -  Primary    Paroxysmal atrial fibrillation (CMS/HCC)              HPI  HPI  He continues to have frequent palpitations on and off happens almost daily continue to be short of breath with small trivial edema otherwise no other cardiac symptoms  PRIOR MEDS  Current Outpatient Medications on File Prior to Visit   Medication Sig Dispense Refill   • Apixaban (ELIQUIS STARTER PACK) tablet Take two 5 mg tablets by mouth every 12 hours for 7 days. Followed by one 5 mg tablet every 12 hours. (Dispense starter pack if available) 180 tablet 1   • bromocriptine (PARLODEL) 2.5 MG tablet Take 2.5 mg by mouth Daily.     • busPIRone (BUSPAR) 10 MG tablet Take 10 mg by mouth 3 (Three) Times a Day.     • clonazePAM (KlonoPIN) 0.5 MG tablet Take 0.5 mg by mouth 2 (two) times a day as needed for seizures.     • docusate sodium (COLACE) 100 MG capsule Take 100 mg by mouth 2 (Two) Times a Day.     • flecainide (TAMBOCOR) 50 MG tablet Take 50 mg by mouth Every 12 (Twelve) Hours.     • fluticasone (FLONASE) 50 MCG/ACT nasal spray 2 sprays into the nostril(s) as directed by provider Daily.     • furosemide (LASIX) 20 MG tablet Take 20 mg by mouth daily.     • gabapentin (NEURONTIN) 300 MG capsule Take 300 mg by mouth 3 (Three) Times a Day.     • HYDROcodone-acetaminophen (NORCO) 5-325 MG per tablet Take 1 tablet by mouth Every 6 (Six) Hours As Needed.     • isosorbide mononitrate (IMDUR) 30 MG 24 hr tablet Take 30 mg by mouth daily.     • LEVALBUTEROL HCL IN Inhale.     • lisinopril (PRINIVIL,ZESTRIL) 2.5 MG tablet Take 2.5 mg by  mouth Daily.     • metFORMIN (GLUCOPHAGE) 500 MG tablet Take 500 mg by mouth Daily With Breakfast.     • methocarbamol (ROBAXIN) 500 MG tablet Take 500 mg by mouth 3 (Three) Times a Day.     • pantoprazole (PROTONIX) 40 MG EC tablet Take 40 mg by mouth Daily.     • promethazine (PHENERGAN) 25 MG tablet Take 25 mg by mouth 2 (Two) Times a Day As Needed for Nausea or Vomiting.     • rosuvastatin (CRESTOR) 20 MG tablet Take 20 mg by mouth Daily.     • sertraline (ZOLOFT) 100 MG tablet Take 100 mg by mouth Daily.     • tiZANidine (ZANAFLEX) 4 MG tablet Take 4 mg by mouth At Night As Needed for Muscle Spasms.     • Topiramate  MG capsule extended-release 24 hour sprinkle Take 100 mg by mouth 2 (Two) Times a Day.     • traZODone (DESYREL) 50 MG tablet Take 50 mg by mouth Every Night.     • Umeclidinium Bromide (INCRUSE ELLIPTA) 62.5 MCG/INH aerosol powder  Inhale.       No current facility-administered medications on file prior to visit.        ALLERGIES  Patient has no known allergies.    HISTORY  Past Medical History:   Diagnosis Date   • Anxiety and depression    • Atypical chest pain    • Diabetes mellitus (CMS/HCC)    • Dyslipidemia    • GERD (gastroesophageal reflux disease)    • H/O valvular heart disease     Echocardiogram 2013 revealing EF 60%. Mild left atrial enlargement, trace AI, trace MR, mild TR.   • Hypertension    • Insomnia    • Migraine headache     Chronic migraine headaches/Topamax therapy.    • Moderate obesity    • AMANDEEP on CPAP    • Pituitary adenoma (CMS/HCC)     History of pituitary adenoma/bromocriptine therapy.    • Sinus bradycardia     Severe symptomatic bradycardia secondary to sinus node dysfunction and sinus bradycardia with pacemaker at elective replacement indicator.   • SVT (supraventricular tachycardia) (CMS/HCC)     With prior ablation.       Social History     Socioeconomic History   • Marital status:      Spouse name: Not on file   • Number of children: Not on file   •  "Years of education: Not on file   • Highest education level: Not on file   Tobacco Use   • Smoking status: Never Smoker   • Smokeless tobacco: Never Used   Substance and Sexual Activity   • Alcohol use: No   • Drug use: No   • Sexual activity: Defer       Family History   Problem Relation Age of Onset   • Heart disease Mother    • Heart failure Mother    • Heart disease Father    • Heart failure Father        Review of Systems   Constitutional: Negative for activity change and appetite change.   HENT: Negative for congestion and drooling.    Eyes: Negative for discharge and itching.   Respiratory: Negative for cough, chest tightness and shortness of breath.    Cardiovascular: Positive for chest pain and leg swelling. Negative for palpitations.   Gastrointestinal: Negative for abdominal distention and abdominal pain.   Endocrine: Negative for cold intolerance and heat intolerance.   Genitourinary: Negative for difficulty urinating and flank pain.   Allergic/Immunologic: Negative for immunocompromised state.   Neurological: Positive for dizziness, light-headedness and headaches. Negative for seizures and syncope.   Hematological: Does not bruise/bleed easily.   Psychiatric/Behavioral: Negative for agitation and behavioral problems.       Objective     VITALS: /77 (BP Location: Right arm, Patient Position: Sitting, Cuff Size: Adult)   Pulse 104   Ht 170.2 cm (67.01\")   Wt 82.1 kg (181 lb)   SpO2 98%   BMI 28.34 kg/m²     LABS:   Lab Results (most recent)     None          IMAGING:   No Images in the past 120 days found..    EXAM:  Physical Exam   Constitutional: She is oriented to person, place, and time. She appears well-developed and well-nourished.   HENT:   Head: Normocephalic and atraumatic.   Eyes: Pupils are equal, round, and reactive to light.   Neck: Neck supple. No JVD present. No thyromegaly present.   Cardiovascular: Normal rate, regular rhythm, normal heart sounds and intact distal pulses. Exam " reveals no gallop and no friction rub.   No murmur heard.  Pacer pocket is clean and nontender   Pulmonary/Chest: Effort normal and breath sounds normal. No stridor. No respiratory distress. She has no wheezes. She has no rales. She exhibits no tenderness.   Musculoskeletal: She exhibits no edema, tenderness or deformity.   Neurological: She is alert and oriented to person, place, and time. No cranial nerve deficit. Coordination normal.   Skin: Skin is warm and dry.   Psychiatric: She has a normal mood and affect.   Vitals reviewed.      Procedure   Procedures       Assessment/Plan    Diagnosis Plan   1. Shortness of breath     2. Paroxysmal atrial fibrillation (CMS/HCC)     3. Presence of cardiac pacemaker     4. Mixed hyperlipidemia     1.  I discussed echocardiogram on mild LV systolic dysfunction could be tachycardia induced will add Coreg to her medications  2.  She does have episodes of atrial fibrillation with RVR I am going to discontinue her flecainide because of the LV systolic dysfunction as well as not controlling the arrhythmia would consider restarting her a different antiarrhythmic medications after the half-lives is finished  3.  We will monitor her pacemaker    No follow-ups on file.    Lian was seen today for results.    Diagnoses and all orders for this visit:    Shortness of breath    Paroxysmal atrial fibrillation (CMS/HCC)    Presence of cardiac pacemaker    Mixed hyperlipidemia                   MEDS ORDERED DURING VISIT:  No orders of the defined types were placed in this encounter.        This document has been electronically signed by Anthony Faustin MD  February 17, 2020 4:20 PM

## 2020-02-18 NOTE — TELEPHONE ENCOUNTER
Have paper will have to wait on Dr. Faustin to provider information as patient has [employer] insurance.

## 2020-02-19 ENCOUNTER — TELEPHONE (OUTPATIENT)
Dept: CARDIOLOGY | Facility: CLINIC | Age: 60
End: 2020-02-19

## 2020-02-19 NOTE — TELEPHONE ENCOUNTER
Spoke with patients pharmacy and they did have the medication there and ready for her. They have called and advised her.

## 2020-02-19 NOTE — TELEPHONE ENCOUNTER
Patient called and said that Dr. Faustin was suppose to call her some medications in that she is suppose to start taking tomorrow. Patient said the pharmacy said they had never been called in.     The name of the medication is:     Carvedilol.     Can someone check on this asap?     Patient said he told her to stop taking her flecainide for two days and then to start this. She is suppose to start carvedilol  tomorrow 12/19/2020.     Thanks!

## 2020-03-02 ENCOUNTER — OFFICE VISIT (OUTPATIENT)
Dept: CARDIOLOGY | Facility: CLINIC | Age: 60
End: 2020-03-02

## 2020-03-02 ENCOUNTER — TREATMENT (OUTPATIENT)
Dept: CARDIOLOGY | Facility: CLINIC | Age: 60
End: 2020-03-02

## 2020-03-02 VITALS
HEART RATE: 109 BPM | BODY MASS INDEX: 29.63 KG/M2 | DIASTOLIC BLOOD PRESSURE: 54 MMHG | RESPIRATION RATE: 16 BRPM | HEIGHT: 66 IN | WEIGHT: 184.4 LBS | SYSTOLIC BLOOD PRESSURE: 72 MMHG

## 2020-03-02 DIAGNOSIS — Z95.0 PRESENCE OF BIVENTRICULAR CARDIAC PACEMAKER: ICD-10-CM

## 2020-03-02 DIAGNOSIS — I49.5 SSS (SICK SINUS SYNDROME) (HCC): Primary | ICD-10-CM

## 2020-03-02 DIAGNOSIS — I10 ESSENTIAL HYPERTENSION: ICD-10-CM

## 2020-03-02 DIAGNOSIS — E78.2 MIXED HYPERLIPIDEMIA: ICD-10-CM

## 2020-03-02 DIAGNOSIS — I48.0 PAROXYSMAL ATRIAL FIBRILLATION (HCC): Primary | ICD-10-CM

## 2020-03-02 PROCEDURE — 93000 ELECTROCARDIOGRAM COMPLETE: CPT | Performed by: PHYSICIAN ASSISTANT

## 2020-03-02 PROCEDURE — 99214 OFFICE O/P EST MOD 30 MIN: CPT | Performed by: PHYSICIAN ASSISTANT

## 2020-03-02 PROCEDURE — 93288 INTERROG EVL PM/LDLS PM IP: CPT | Performed by: INTERNAL MEDICINE

## 2020-03-02 NOTE — PROGRESS NOTES
"Arben Ibrahim MD  Lian Pearl  1960 03/02/2020    Patient Active Problem List   Diagnosis   • SVT (supraventricular tachycardia) (CMS/[facility])   • Sinus bradycardia   • H/O valvular heart disease   • AMANDEEP on CPAP   • Moderate obesity   • Anxiety and depression   • GERD (gastroesophageal reflux disease)   • Diabetes mellitus (CMS/HCC)   • Insomnia   • Migraine headache   • Atypical chest pain   • Syncope and collapse   • Presence of biventricular cardiac pacemaker   • Presence of cardiac pacemaker   • Essential hypertension   • Mixed hyperlipidemia   • Paroxysmal atrial fibrillation (CMS/[facility])   • Shortness of breath       Dear Arben Ibrahim MD:    Subjective     History of Present Illness:    Chief Complaint   Patient presents with   • Palpitations     event monitor to be returned x12 days early due to skin irritation   • Chest Pain     \"heavy\"   • Shortness of Breath     routine activity   • Med Management     list provided       Lian Kang is a pleasant 59 y.o. female with a past medical history significant for paroxysmal atrial fibrillation, dyslipidemia, essential hypertension, and sick sinus syndrome status post permanent pacemaker implantation.  Patient comes in for routine cardiology follow-up.    Patient's blood pressure is markedly low today we did check her blood pressure multiple times both manually and with the machine with her systolic ranging from 72 to 85 mmHg.  Clinically she is doing relatively well with this she does feel weak and fatigued but denies any dizziness, syncope, or near syncope.  She has been wearing her cardiac event monitor that was ordered at last visit however she has not been able to turn this in yet and does report a rash where she was wearing it.  Patient did have a marketed IA interval on EKG today so I did have her pacemaker interrogated where patient's pacemaker was switched to DDDR and IA interval was shortened. She does admit that has taken an extra dose of " her hydrocodone do to increased back pain lately as well.     No Known Allergies:      Current Outpatient Medications:   •  Apixaban (ELIQUIS STARTER PACK) tablet, Take two 5 mg tablets by mouth every 12 hours for 7 days. Followed by one 5 mg tablet every 12 hours. (Dispense starter pack if available), Disp: 180 tablet, Rfl: 1  •  bromocriptine (PARLODEL) 2.5 MG tablet, Take 2.5 mg by mouth Daily., Disp: , Rfl:   •  busPIRone (BUSPAR) 10 MG tablet, Take 10 mg by mouth 3 (Three) Times a Day., Disp: , Rfl:   •  carvedilol (COREG) 6.25 MG tablet, Take 1 tablet by mouth 2 (Two) Times a Day., Disp: 180 tablet, Rfl: 3  •  clonazePAM (KlonoPIN) 0.5 MG tablet, Take 0.5 mg by mouth 2 (two) times a day as needed for seizures., Disp: , Rfl:   •  docusate sodium (COLACE) 100 MG capsule, Take 100 mg by mouth 2 (Two) Times a Day., Disp: , Rfl:   •  fluticasone (FLONASE) 50 MCG/ACT nasal spray, 2 sprays into the nostril(s) as directed by provider Daily., Disp: , Rfl:   •  furosemide (LASIX) 20 MG tablet, Take 20 mg by mouth daily., Disp: , Rfl:   •  gabapentin (NEURONTIN) 300 MG capsule, Take 300 mg by mouth 3 (Three) Times a Day., Disp: , Rfl:   •  HYDROcodone-acetaminophen (NORCO) 5-325 MG per tablet, Take 1 tablet by mouth Every 6 (Six) Hours As Needed., Disp: , Rfl:   •  isosorbide mononitrate (IMDUR) 30 MG 24 hr tablet, Take 30 mg by mouth daily., Disp: , Rfl:   •  LEVALBUTEROL HCL IN, Inhale., Disp: , Rfl:   •  metFORMIN (GLUCOPHAGE) 500 MG tablet, Take 500 mg by mouth Daily With Breakfast., Disp: , Rfl:   •  methocarbamol (ROBAXIN) 500 MG tablet, Take 500 mg by mouth 3 (Three) Times a Day., Disp: , Rfl:   •  pantoprazole (PROTONIX) 40 MG EC tablet, Take 40 mg by mouth Daily., Disp: , Rfl:   •  promethazine (PHENERGAN) 25 MG tablet, Take 25 mg by mouth 2 (Two) Times a Day As Needed for Nausea or Vomiting., Disp: , Rfl:   •  rosuvastatin (CRESTOR) 20 MG tablet, Take 20 mg by mouth Daily., Disp: , Rfl:   •  sertraline (ZOLOFT)  "100 MG tablet, Take 100 mg by mouth Daily., Disp: , Rfl:   •  tiZANidine (ZANAFLEX) 4 MG tablet, Take 4 mg by mouth At Night As Needed for Muscle Spasms., Disp: , Rfl:   •  Topiramate  MG capsule extended-release 24 hour sprinkle, Take 100 mg by mouth 2 (Two) Times a Day., Disp: , Rfl:   •  traZODone (DESYREL) 50 MG tablet, Take 50 mg by mouth Every Night., Disp: , Rfl:   •  Umeclidinium Bromide (INCRUSE ELLIPTA) 62.5 MCG/INH aerosol powder , Inhale., Disp: , Rfl:     The following portions of the patient's history were reviewed and updated as appropriate: allergies, current medications, past family history, past medical history, past social history, past surgical history and problem list.    Social History     Tobacco Use   • Smoking status: Never Smoker   • Smokeless tobacco: Never Used   Substance Use Topics   • Alcohol use: No   • Drug use: No       Review of Systems   Constitution: Negative for malaise/fatigue.   Cardiovascular: Positive for chest pain, leg swelling and palpitations. Negative for dyspnea on exertion and irregular heartbeat.   Respiratory: Positive for shortness of breath. Negative for cough.    Hematologic/Lymphatic: Negative for bleeding problem. Does not bruise/bleed easily.   Gastrointestinal: Positive for bloating. Negative for nausea and vomiting.   Neurological: Negative for weakness.     Objective   Vitals:    03/02/20 1229 03/02/20 1230 03/02/20 1236   BP: (!) 83/55 (!) 85/63 (!) 72/54   BP Location: Left arm Right arm Left arm   Patient Position:   Sitting   Cuff Size:   Adult   Pulse: 79 109    Resp: 16     Weight: 83.6 kg (184 lb 6.4 oz)     Height: 167.6 cm (66\")       Body mass index is 29.76 kg/m².    Physical Exam   Constitutional: She is oriented to person, place, and time. She appears well-developed and well-nourished. No distress.   HENT:   Head: Normocephalic and atraumatic.   Cardiovascular: Normal rate, regular rhythm and normal heart sounds.   Pulmonary/Chest: Effort " normal and breath sounds normal. No respiratory distress.   Musculoskeletal: She exhibits no edema.   Neurological: She is alert and oriented to person, place, and time.   Skin: She is not diaphoretic.       Lab Results   Component Value Date     03/06/2016    K 3.2 (L) 03/06/2016     03/06/2016    CO2 28 03/06/2016    BUN 9 03/06/2016    CREATININE 0.8 03/06/2016    GLUCOSE 112 (H) 03/06/2016    CALCIUM 9.2 03/06/2016    AST 54 (H) 09/19/2014    ALT 53 (H) 09/19/2014    ALKPHOS 85 09/19/2014    LABIL2 2.2 09/19/2014     Lab Results   Component Value Date    CKTOTAL 116 09/20/2014     Lab Results   Component Value Date    WBC 9.21 03/06/2016    HGB 13.7 03/06/2016    HCT 40.5 03/06/2016     03/06/2016     Lab Results   Component Value Date    INR 0.96 03/06/2016    INR 0.90 09/20/2014    INR 0.96 09/17/2014     Lab Results   Component Value Date    MG 2.1 03/07/2016     Lab Results   Component Value Date    TSH 2.508 09/20/2014      Lab Results   Component Value Date    BNP 59 09/19/2014       During this visit the following were done:  Labs Reviewed [x]    Labs Ordered []    Radiology Reports Reviewed [x]    Radiology Ordered []    PCP Records Reviewed []    Referring Provider Records Reviewed []    ER Records Reviewed []    Hospital Records Reviewed []    History Obtained From Family []    Radiology Images Reviewed []    Other Reviewed []    Records Requested []         ECG 12 Lead  Date/Time: 3/2/2020 12:33 PM  Performed by: Curtis Bettencourt PA-C  Authorized by: Curtis Bettencourt PA-C   Comparison: compared with previous ECG   Comparison to previous ECG: ID interval increased to 337  Rhythm: sinus rhythm  Conduction: non-specific intraventricular conduction delay  T inversion: V1  Pacing: atrial sensed rhythm  Clinical impression: abnormal EKG  Comments: Atrial paced            Assessment/Plan    Diagnosis Plan   1. Paroxysmal atrial fibrillation (CMS/HCC)     2. Essential hypertension     3.  Presence of biventricular cardiac pacemaker     4. Mixed hyperlipidemia              Recommendations:  1. I will stop her imdur and lisinopril due to hypotension. I also asked her to not take lasix or coreg until blood pressure is >100/60.  2. I also asked her to not take more than recommended hydrocodone by the physician that prescribes it.   3. Had her pacemaker urgently interogated that lead to a few pacer changes and did discuss and involve Dr. Faustin in the case who agreed with medication changes.       Return in about 4 weeks (around 3/30/2020).    As always, I appreciate very much the opportunity to participate in the cardiovascular care of your patients.      With Best Regards,    Curtis Bettencourt PA-C

## 2020-03-04 ENCOUNTER — TELEPHONE (OUTPATIENT)
Dept: CARDIOLOGY | Facility: CLINIC | Age: 60
End: 2020-03-04

## 2020-03-04 RX ORDER — PRAVASTATIN SODIUM 40 MG
80 TABLET ORAL NIGHTLY
Status: ON HOLD | COMMUNITY
End: 2020-04-01

## 2020-03-04 NOTE — TELEPHONE ENCOUNTER
Pt reports she is taking Pravastatin 40 mg;  dose was recently increased by PCP yesterday to 80 mg QD.

## 2020-03-04 NOTE — TELEPHONE ENCOUNTER
----- Message from Curtis Bettencourt PA-C sent at 3/3/2020  4:12 PM EST -----  Patient's cholesterol is elevated. Will bennefit from statin if agreeable to start.

## 2020-03-09 PROCEDURE — 93272 ECG/REVIEW INTERPRET ONLY: CPT | Performed by: SPECIALIST

## 2020-03-10 ENCOUNTER — TREATMENT (OUTPATIENT)
Dept: CARDIOLOGY | Facility: CLINIC | Age: 60
End: 2020-03-10

## 2020-03-10 ENCOUNTER — DOCUMENTATION (OUTPATIENT)
Dept: CARDIOLOGY | Facility: CLINIC | Age: 60
End: 2020-03-10

## 2020-03-10 DIAGNOSIS — R55 SYNCOPE AND COLLAPSE: ICD-10-CM

## 2020-03-10 DIAGNOSIS — R00.2 PALPITATIONS: ICD-10-CM

## 2020-03-23 ENCOUNTER — OFFICE VISIT (OUTPATIENT)
Dept: CARDIOLOGY | Facility: CLINIC | Age: 60
End: 2020-03-23

## 2020-03-23 VITALS
BODY MASS INDEX: 29.57 KG/M2 | HEART RATE: 101 BPM | DIASTOLIC BLOOD PRESSURE: 104 MMHG | RESPIRATION RATE: 16 BRPM | HEIGHT: 66 IN | SYSTOLIC BLOOD PRESSURE: 148 MMHG | WEIGHT: 184 LBS

## 2020-03-23 DIAGNOSIS — Z95.0 PRESENCE OF BIVENTRICULAR CARDIAC PACEMAKER: ICD-10-CM

## 2020-03-23 DIAGNOSIS — I48.0 PAROXYSMAL ATRIAL FIBRILLATION (HCC): ICD-10-CM

## 2020-03-23 DIAGNOSIS — E78.2 MIXED HYPERLIPIDEMIA: ICD-10-CM

## 2020-03-23 DIAGNOSIS — I10 ESSENTIAL HYPERTENSION: Primary | ICD-10-CM

## 2020-03-23 PROCEDURE — 99213 OFFICE O/P EST LOW 20 MIN: CPT | Performed by: SPECIALIST

## 2020-03-23 RX ORDER — SERTRALINE HYDROCHLORIDE 100 MG/1
100 TABLET, FILM COATED ORAL DAILY
Qty: 90 TABLET | Refills: 1 | Status: SHIPPED | OUTPATIENT
Start: 2020-03-23

## 2020-03-23 NOTE — PROGRESS NOTES
Subjective   Follow up, hypotension  Lian Kang is a 59 y.o. female who presents to day for Atrial Fibrillation.    CHIEF COMPLIANT  Chief Complaint   Patient presents with   • Atrial Fibrillation       Active Problems:  Problem List Items Addressed This Visit        Cardiovascular and Mediastinum    Presence of biventricular cardiac pacemaker    Essential hypertension - Primary    Mixed hyperlipidemia    Paroxysmal atrial fibrillation (CMS/HCC)          HPI  HPI  Field will more chest pressure and feels heart racing on and off since she quit taking her medications continues to have severe low back pain and requiring medications she was seen in the past by the neurosurgery but she is not a good candidate for surgical intervention  PRIOR MEDS  Current Outpatient Medications on File Prior to Visit   Medication Sig Dispense Refill   • Apixaban (ELIQUIS STARTER PACK) tablet Take two 5 mg tablets by mouth every 12 hours for 7 days. Followed by one 5 mg tablet every 12 hours. (Dispense starter pack if available) 180 tablet 1   • bromocriptine (PARLODEL) 2.5 MG tablet Take 2.5 mg by mouth Daily.     • busPIRone (BUSPAR) 10 MG tablet Take 10 mg by mouth 3 (Three) Times a Day.     • carvedilol (COREG) 6.25 MG tablet Take 1 tablet by mouth 2 (Two) Times a Day. 180 tablet 3   • clonazePAM (KlonoPIN) 0.5 MG tablet Take 0.5 mg by mouth 2 (two) times a day as needed for seizures.     • docusate sodium (COLACE) 100 MG capsule Take 100 mg by mouth 2 (Two) Times a Day.     • fluticasone (FLONASE) 50 MCG/ACT nasal spray 2 sprays into the nostril(s) as directed by provider Daily.     • furosemide (LASIX) 20 MG tablet Take 20 mg by mouth daily.     • gabapentin (NEURONTIN) 300 MG capsule Take 300 mg by mouth 3 (Three) Times a Day.     • HYDROcodone-acetaminophen (NORCO) 5-325 MG per tablet Take 1 tablet by mouth Every 6 (Six) Hours As Needed.     • isosorbide mononitrate (IMDUR) 30 MG 24 hr tablet Take 30 mg by mouth daily.     •  LEVALBUTEROL HCL IN Inhale.     • metFORMIN (GLUCOPHAGE) 500 MG tablet Take 500 mg by mouth Daily With Breakfast.     • methocarbamol (ROBAXIN) 500 MG tablet Take 500 mg by mouth 3 (Three) Times a Day.     • pantoprazole (PROTONIX) 40 MG EC tablet Take 40 mg by mouth Daily.     • pravastatin (PRAVACHOL) 40 MG tablet Take 80 mg by mouth Every Night.     • promethazine (PHENERGAN) 25 MG tablet Take 25 mg by mouth 2 (Two) Times a Day As Needed for Nausea or Vomiting.     • sertraline (ZOLOFT) 100 MG tablet Take 100 mg by mouth Daily.     • tiZANidine (ZANAFLEX) 4 MG tablet Take 4 mg by mouth At Night As Needed for Muscle Spasms.     • Topiramate  MG capsule extended-release 24 hour sprinkle Take 100 mg by mouth 2 (Two) Times a Day.     • traZODone (DESYREL) 50 MG tablet Take 50 mg by mouth Every Night.     • Umeclidinium Bromide (INCRUSE ELLIPTA) 62.5 MCG/INH aerosol powder  Inhale.       No current facility-administered medications on file prior to visit.        ALLERGIES  Patient has no known allergies.    HISTORY  Past Medical History:   Diagnosis Date   • Anxiety and depression    • Atypical chest pain    • Diabetes mellitus (CMS/HCC)    • Dyslipidemia    • GERD (gastroesophageal reflux disease)    • H/O valvular heart disease     Echocardiogram 2013 revealing EF 60%. Mild left atrial enlargement, trace AI, trace MR, mild TR.   • Hypertension    • Insomnia    • Migraine headache     Chronic migraine headaches/Topamax therapy.    • Moderate obesity    • AMANDEEP on CPAP    • Pituitary adenoma (CMS/HCC)     History of pituitary adenoma/bromocriptine therapy.    • Sinus bradycardia     Severe symptomatic bradycardia secondary to sinus node dysfunction and sinus bradycardia with pacemaker at elective replacement indicator.   • SVT (supraventricular tachycardia) (CMS/HCC)     With prior ablation.       Social History     Socioeconomic History   • Marital status:      Spouse name: Not on file   • Number of  "children: Not on file   • Years of education: Not on file   • Highest education level: Not on file   Tobacco Use   • Smoking status: Never Smoker   • Smokeless tobacco: Never Used   Substance and Sexual Activity   • Alcohol use: No   • Drug use: No   • Sexual activity: Defer       Family History   Problem Relation Age of Onset   • Heart disease Mother    • Heart failure Mother    • Heart disease Father    • Heart failure Father        Review of Systems   Constitutional: Negative for activity change and appetite change.   HENT: Negative for congestion and drooling.    Eyes: Negative for discharge and itching.   Respiratory: Positive for chest tightness and shortness of breath. Negative for wheezing.    Cardiovascular: Positive for chest pain.   Gastrointestinal: Negative for abdominal distention and abdominal pain.   Endocrine: Negative for cold intolerance and heat intolerance.   Genitourinary: Negative for difficulty urinating and flank pain.   Musculoskeletal: Negative for neck stiffness.   Skin: Negative for color change and pallor.   Allergic/Immunologic: Negative for immunocompromised state.   Neurological: Negative for facial asymmetry and numbness.   Hematological: Does not bruise/bleed easily.   Psychiatric/Behavioral: Negative for agitation and behavioral problems.       Objective     VITALS: BP (!) 148/104   Pulse 101   Resp 16   Ht 167.6 cm (65.98\")   Wt 83.5 kg (184 lb)   BMI 29.71 kg/m²     LABS:   Lab Results (most recent)     None          IMAGING:   No Images in the past 120 days found..    EXAM:  Physical Exam   Constitutional: She is oriented to person, place, and time. She appears well-developed and well-nourished.   HENT:   Head: Normocephalic and atraumatic.   Eyes: Pupils are equal, round, and reactive to light.   Neck: Neck supple. No JVD present. No thyromegaly present.   Cardiovascular: Normal rate, regular rhythm, normal heart sounds and intact distal pulses. Exam reveals no gallop and " no friction rub.   No murmur heard.  Pacer pocket tender and clean   Pulmonary/Chest: Effort normal and breath sounds normal. No stridor. No respiratory distress. She has no wheezes. She has no rales. She exhibits no tenderness.   Musculoskeletal: She exhibits no edema, tenderness or deformity.   Neurological: She is alert and oriented to person, place, and time. No cranial nerve deficit. Coordination normal.   Skin: Skin is warm and dry.   Psychiatric: She has a normal mood and affect.   Vitals reviewed.      Procedure   Procedures       Assessment/Plan    Diagnosis Plan   1. Essential hypertension     2. Paroxysmal atrial fibrillation (CMS/HCC)     3. Mixed hyperlipidemia     4. Presence of biventricular cardiac pacemaker       1.  Now her blood pressure is elevated she has not been taking any of the medications including Coreg lisinopril and furosemide she feels her heart is racing a little bit since she stopped the Coreg with intermittent chest discomfort her blood pressure today is rated so I am going to restart the Coreg and there is a mild 1 of the issue that she has chronic low back pain and she is using a lot of pain medications this is of a concern she will use the furosemide as a PRN basis  2.  Currently since she is in sinus rhythm we will continue the current management  3.  Pacemaker etc. getting reprogrammed on last visit monitor  No follow-ups on file.    Lian was seen today for atrial fibrillation.    Diagnoses and all orders for this visit:    Essential hypertension    Paroxysmal atrial fibrillation (CMS/HCC)    Mixed hyperlipidemia    Presence of biventricular cardiac pacemaker                 MEDS ORDERED DURING VISIT:  No orders of the defined types were placed in this encounter.        This document has been electronically signed by Anthony Faustin MD  March 23, 2020 14:35

## 2020-04-01 ENCOUNTER — APPOINTMENT (OUTPATIENT)
Dept: CT IMAGING | Facility: HOSPITAL | Age: 60
End: 2020-04-01

## 2020-04-01 ENCOUNTER — APPOINTMENT (OUTPATIENT)
Dept: GENERAL RADIOLOGY | Facility: HOSPITAL | Age: 60
End: 2020-04-01

## 2020-04-01 ENCOUNTER — HOSPITAL ENCOUNTER (OUTPATIENT)
Facility: HOSPITAL | Age: 60
Setting detail: OBSERVATION
Discharge: HOME | End: 2020-04-03
Attending: FAMILY MEDICINE | Admitting: INTERNAL MEDICINE

## 2020-04-01 DIAGNOSIS — J18.9 PNEUMONIA OF RIGHT UPPER LOBE DUE TO INFECTIOUS ORGANISM: ICD-10-CM

## 2020-04-01 DIAGNOSIS — R07.9 CHEST PAIN, UNSPECIFIED TYPE: ICD-10-CM

## 2020-04-01 DIAGNOSIS — R94.31 EKG ABNORMALITIES: Primary | ICD-10-CM

## 2020-04-01 PROBLEM — I20.0 UNSTABLE ANGINA (HCC): Status: ACTIVE | Noted: 2020-04-01

## 2020-04-01 LAB
ALBUMIN SERPL-MCNC: 4.12 G/DL (ref 3.5–5.2)
ALBUMIN/GLOB SERPL: 1.4 G/DL
ALP SERPL-CCNC: 67 U/L (ref 39–117)
ALT SERPL W P-5'-P-CCNC: 15 U/L (ref 1–33)
ANION GAP SERPL CALCULATED.3IONS-SCNC: 11.3 MMOL/L (ref 5–15)
AST SERPL-CCNC: 18 U/L (ref 1–32)
BASOPHILS # BLD AUTO: 0.1 10*3/MM3 (ref 0–0.2)
BASOPHILS NFR BLD AUTO: 1.3 % (ref 0–1.5)
BILIRUB SERPL-MCNC: 0.2 MG/DL (ref 0.2–1.2)
BUN BLD-MCNC: 12 MG/DL (ref 8–23)
BUN/CREAT SERPL: 14 (ref 7–25)
CALCIUM SPEC-SCNC: 9.2 MG/DL (ref 8.6–10.5)
CHLORIDE SERPL-SCNC: 107 MMOL/L (ref 98–107)
CO2 SERPL-SCNC: 24.7 MMOL/L (ref 22–29)
CREAT BLD-MCNC: 0.86 MG/DL (ref 0.57–1)
CRP SERPL-MCNC: 0.08 MG/DL (ref 0–0.5)
D DIMER PPP FEU-MCNC: 0.4 MCGFEU/ML (ref 0–0.5)
D-LACTATE SERPL-SCNC: 0.7 MMOL/L (ref 0.5–2)
DEPRECATED RDW RBC AUTO: 46.7 FL (ref 37–54)
EOSINOPHIL # BLD AUTO: 0.26 10*3/MM3 (ref 0–0.4)
EOSINOPHIL NFR BLD AUTO: 3.3 % (ref 0.3–6.2)
ERYTHROCYTE [DISTWIDTH] IN BLOOD BY AUTOMATED COUNT: 13.8 % (ref 12.3–15.4)
GFR SERPL CREATININE-BSD FRML MDRD: 67 ML/MIN/1.73
GLOBULIN UR ELPH-MCNC: 2.9 GM/DL
GLUCOSE BLD-MCNC: 90 MG/DL (ref 65–99)
HBA1C MFR BLD: 5.8 % (ref 4.8–5.6)
HCT VFR BLD AUTO: 42.7 % (ref 34–46.6)
HGB BLD-MCNC: 13.2 G/DL (ref 12–15.9)
IMM GRANULOCYTES # BLD AUTO: 0.04 10*3/MM3 (ref 0–0.05)
IMM GRANULOCYTES NFR BLD AUTO: 0.5 % (ref 0–0.5)
LIPASE SERPL-CCNC: 39 U/L (ref 13–60)
LYMPHOCYTES # BLD AUTO: 2.52 10*3/MM3 (ref 0.7–3.1)
LYMPHOCYTES NFR BLD AUTO: 31.6 % (ref 19.6–45.3)
MAGNESIUM SERPL-MCNC: 2.1 MG/DL (ref 1.6–2.4)
MCH RBC QN AUTO: 28.4 PG (ref 26.6–33)
MCHC RBC AUTO-ENTMCNC: 30.9 G/DL (ref 31.5–35.7)
MCV RBC AUTO: 92 FL (ref 79–97)
MONOCYTES # BLD AUTO: 0.69 10*3/MM3 (ref 0.1–0.9)
MONOCYTES NFR BLD AUTO: 8.7 % (ref 5–12)
NEUTROPHILS # BLD AUTO: 4.36 10*3/MM3 (ref 1.7–7)
NEUTROPHILS NFR BLD AUTO: 54.6 % (ref 42.7–76)
NRBC BLD AUTO-RTO: 0 /100 WBC (ref 0–0.2)
NT-PROBNP SERPL-MCNC: 64.8 PG/ML (ref 5–900)
PLATELET # BLD AUTO: 250 10*3/MM3 (ref 140–450)
PMV BLD AUTO: 9.4 FL (ref 6–12)
POTASSIUM BLD-SCNC: 4 MMOL/L (ref 3.5–5.2)
PROT SERPL-MCNC: 7 G/DL (ref 6–8.5)
RBC # BLD AUTO: 4.64 10*6/MM3 (ref 3.77–5.28)
SODIUM BLD-SCNC: 143 MMOL/L (ref 136–145)
TROPONIN T SERPL-MCNC: <0.01 NG/ML (ref 0–0.03)
TROPONIN T SERPL-MCNC: <0.01 NG/ML (ref 0–0.03)
TSH SERPL DL<=0.05 MIU/L-ACNC: 0.89 UIU/ML (ref 0.27–4.2)
WBC NRBC COR # BLD: 7.97 10*3/MM3 (ref 3.4–10.8)

## 2020-04-01 PROCEDURE — 94799 UNLISTED PULMONARY SVC/PX: CPT

## 2020-04-01 PROCEDURE — 83605 ASSAY OF LACTIC ACID: CPT | Performed by: FAMILY MEDICINE

## 2020-04-01 PROCEDURE — G0378 HOSPITAL OBSERVATION PER HR: HCPCS

## 2020-04-01 PROCEDURE — 93010 ELECTROCARDIOGRAM REPORT: CPT | Performed by: SPECIALIST

## 2020-04-01 PROCEDURE — 71045 X-RAY EXAM CHEST 1 VIEW: CPT | Performed by: RADIOLOGY

## 2020-04-01 PROCEDURE — 99220 PR INITIAL OBSERVATION CARE/DAY 70 MINUTES: CPT | Performed by: HOSPITALIST

## 2020-04-01 PROCEDURE — 93005 ELECTROCARDIOGRAM TRACING: CPT | Performed by: FAMILY MEDICINE

## 2020-04-01 PROCEDURE — 83690 ASSAY OF LIPASE: CPT | Performed by: FAMILY MEDICINE

## 2020-04-01 PROCEDURE — 83735 ASSAY OF MAGNESIUM: CPT | Performed by: FAMILY MEDICINE

## 2020-04-01 PROCEDURE — 85379 FIBRIN DEGRADATION QUANT: CPT | Performed by: FAMILY MEDICINE

## 2020-04-01 PROCEDURE — 87040 BLOOD CULTURE FOR BACTERIA: CPT | Performed by: FAMILY MEDICINE

## 2020-04-01 PROCEDURE — 84484 ASSAY OF TROPONIN QUANT: CPT | Performed by: FAMILY MEDICINE

## 2020-04-01 PROCEDURE — 71275 CT ANGIOGRAPHY CHEST: CPT

## 2020-04-01 PROCEDURE — 36415 COLL VENOUS BLD VENIPUNCTURE: CPT

## 2020-04-01 PROCEDURE — 99285 EMERGENCY DEPT VISIT HI MDM: CPT

## 2020-04-01 PROCEDURE — 71275 CT ANGIOGRAPHY CHEST: CPT | Performed by: RADIOLOGY

## 2020-04-01 PROCEDURE — 83880 ASSAY OF NATRIURETIC PEPTIDE: CPT | Performed by: FAMILY MEDICINE

## 2020-04-01 PROCEDURE — 83036 HEMOGLOBIN GLYCOSYLATED A1C: CPT | Performed by: HOSPITALIST

## 2020-04-01 PROCEDURE — 71045 X-RAY EXAM CHEST 1 VIEW: CPT

## 2020-04-01 PROCEDURE — 80050 GENERAL HEALTH PANEL: CPT | Performed by: FAMILY MEDICINE

## 2020-04-01 PROCEDURE — 70450 CT HEAD/BRAIN W/O DYE: CPT

## 2020-04-01 PROCEDURE — 0 IOVERSOL 74 % SOLUTION: Performed by: FAMILY MEDICINE

## 2020-04-01 PROCEDURE — 96365 THER/PROPH/DIAG IV INF INIT: CPT

## 2020-04-01 PROCEDURE — 86140 C-REACTIVE PROTEIN: CPT | Performed by: FAMILY MEDICINE

## 2020-04-01 RX ORDER — NICOTINE POLACRILEX 4 MG
15 LOZENGE BUCCAL
Status: DISCONTINUED | OUTPATIENT
Start: 2020-04-01 | End: 2020-04-03 | Stop reason: HOSPADM

## 2020-04-01 RX ORDER — LEVALBUTEROL TARTRATE 45 UG/1
1-2 AEROSOL, METERED ORAL EVERY 4 HOURS PRN
COMMUNITY

## 2020-04-01 RX ORDER — SODIUM CHLORIDE 0.9 % (FLUSH) 0.9 %
10 SYRINGE (ML) INJECTION AS NEEDED
Status: DISCONTINUED | OUTPATIENT
Start: 2020-04-01 | End: 2020-04-03 | Stop reason: HOSPADM

## 2020-04-01 RX ORDER — ASPIRIN 325 MG
325 TABLET ORAL ONCE
Status: COMPLETED | OUTPATIENT
Start: 2020-04-01 | End: 2020-04-01

## 2020-04-01 RX ORDER — DEXTROSE MONOHYDRATE 25 G/50ML
25 INJECTION, SOLUTION INTRAVENOUS
Status: DISCONTINUED | OUTPATIENT
Start: 2020-04-01 | End: 2020-04-03 | Stop reason: HOSPADM

## 2020-04-01 RX ORDER — NITROGLYCERIN 0.4 MG/1
0.4 TABLET SUBLINGUAL
Status: DISCONTINUED | OUTPATIENT
Start: 2020-04-01 | End: 2020-04-03 | Stop reason: HOSPADM

## 2020-04-01 RX ORDER — SODIUM CHLORIDE 0.9 % (FLUSH) 0.9 %
10 SYRINGE (ML) INJECTION EVERY 12 HOURS SCHEDULED
Status: DISCONTINUED | OUTPATIENT
Start: 2020-04-01 | End: 2020-04-03 | Stop reason: HOSPADM

## 2020-04-01 RX ORDER — CLOPIDOGREL BISULFATE 75 MG/1
75 TABLET ORAL DAILY
Status: DISCONTINUED | OUTPATIENT
Start: 2020-04-02 | End: 2020-04-02

## 2020-04-01 RX ORDER — TOPIRAMATE 100 MG/1
100 CAPSULE, EXTENDED RELEASE ORAL 2 TIMES DAILY
Status: CANCELLED | OUTPATIENT
Start: 2020-04-02

## 2020-04-01 RX ORDER — PRAVASTATIN SODIUM 80 MG/1
80 TABLET ORAL NIGHTLY
COMMUNITY
End: 2020-05-04 | Stop reason: SDUPTHER

## 2020-04-01 RX ORDER — PRAVASTATIN SODIUM 40 MG
80 TABLET ORAL NIGHTLY
Status: DISCONTINUED | OUTPATIENT
Start: 2020-04-01 | End: 2020-04-03 | Stop reason: HOSPADM

## 2020-04-01 RX ORDER — HYDROCODONE BITARTRATE AND ACETAMINOPHEN 5; 325 MG/1; MG/1
1 TABLET ORAL ONCE
Status: COMPLETED | OUTPATIENT
Start: 2020-04-01 | End: 2020-04-01

## 2020-04-01 RX ORDER — ASPIRIN 81 MG/1
81 TABLET ORAL DAILY
Status: DISCONTINUED | OUTPATIENT
Start: 2020-04-02 | End: 2020-04-03 | Stop reason: HOSPADM

## 2020-04-01 RX ADMIN — IOVERSOL 100 ML: 741 INJECTION INTRA-ARTERIAL; INTRAVENOUS at 19:22

## 2020-04-01 RX ADMIN — DOXYCYCLINE 100 MG: 100 INJECTION, POWDER, LYOPHILIZED, FOR SOLUTION INTRAVENOUS at 19:30

## 2020-04-01 RX ADMIN — PRAVASTATIN SODIUM 80 MG: 40 TABLET ORAL at 23:54

## 2020-04-01 RX ADMIN — HYDROCODONE BITARTRATE AND ACETAMINOPHEN 1 TABLET: 5; 325 TABLET ORAL at 17:35

## 2020-04-01 RX ADMIN — ASPIRIN 325 MG: 325 TABLET ORAL at 18:26

## 2020-04-01 NOTE — ED PROVIDER NOTES
Subjective   Patient is a 59-year-old female who presents to the emergency department complaining of chest pain.  She has had 2 episodes of chest pain today.  Her first episode of chest pain started around 10 AM this morning and lasted about 5 to 10 minutes.  She states that she took a nitroglycerin and had relief of her chest pain at this time.  She then experienced a second episode of chest pain around noon today.  She states that this episode also lasted between 5 and 10 minutes.  Again this was slightly relieved some nitroglycerin.  She went to her PCPs office and explained her symptoms and was sent to the emergency department via EMS for further evaluation.  She tells me that she has a history of a pacemaker for bradycardia.  The patient states that she is not had any issues with her pacemaker since was placed a few years ago.  The patient states that she is not having any nausea or vomiting but does have some chronic shortness of breath which is no worse than usual.  She denies any fever or chills.  She describes the chest pain as an aching sensation in her chest that sometimes feels like a pressure.  She is also complaining some back pain especially between her shoulder blades.  She denies any diaphoresis, dizziness, coughing or any additional symptoms at this time.          Review of Systems   Constitutional: Negative for activity change, appetite change, chills, diaphoresis, fatigue and fever.   HENT: Negative for congestion, postnasal drip, rhinorrhea, sinus pressure, sinus pain, sneezing, sore throat and tinnitus.    Eyes: Negative for discharge and itching.   Respiratory: Negative for apnea, cough, chest tightness, shortness of breath and wheezing.    Cardiovascular: Positive for chest pain. Negative for palpitations and leg swelling.   Gastrointestinal: Negative for abdominal distention, abdominal pain, constipation, diarrhea, nausea and vomiting.   Genitourinary: Negative for difficulty urinating and  flank pain.   Musculoskeletal: Positive for back pain. Negative for arthralgias.   Neurological: Negative for dizziness and light-headedness.   Psychiatric/Behavioral: Negative for agitation, behavioral problems and confusion.       Past Medical History:   Diagnosis Date   • Anxiety and depression    • Atypical chest pain    • Diabetes mellitus (CMS/HCC)    • Dyslipidemia    • GERD (gastroesophageal reflux disease)    • H/O valvular heart disease     Echocardiogram 2013 revealing EF 60%. Mild left atrial enlargement, trace AI, trace MR, mild TR.   • Hypertension    • Insomnia    • Migraine headache     Chronic migraine headaches/Topamax therapy.    • Moderate obesity    • AMANDEEP on CPAP    • Pituitary adenoma (CMS/HCC)     History of pituitary adenoma/bromocriptine therapy.    • Sinus bradycardia     Severe symptomatic bradycardia secondary to sinus node dysfunction and sinus bradycardia with pacemaker at elective replacement indicator.   • SVT (supraventricular tachycardia) (CMS/HCC)     With prior ablation.       No Known Allergies    Past Surgical History:   Procedure Laterality Date   • PACEMAKER IMPLANTATION  2010    Apparent reported symptomatic bradycardia on metoprolol therapy/status post dual chamber [place] permanent pacemaker implantation by Dr. Michael Mcknight in 2010.       Family History   Problem Relation Age of Onset   • Heart disease Mother    • Heart failure Mother    • Heart disease Father    • Heart failure Father        Social History     Socioeconomic History   • Marital status:      Spouse name: Not on file   • Number of children: Not on file   • Years of education: Not on file   • Highest education level: Not on file   Tobacco Use   • Smoking status: Never Smoker   • Smokeless tobacco: Never Used   Substance and Sexual Activity   • Alcohol use: No   • Drug use: No   • Sexual activity: Defer           Objective   Physical Exam   Constitutional: She is oriented to person, place, and time.  She appears well-developed and well-nourished.  Non-toxic appearance. She does not appear ill. No distress.   HENT:   Head: Normocephalic and atraumatic.   Eyes: Pupils are equal, round, and reactive to light. EOM are normal.   Neck: Normal range of motion. Neck supple. No hepatojugular reflux and no JVD present. No tracheal deviation present. No thyromegaly present.   Cardiovascular: Normal rate, regular rhythm, intact distal pulses and normal pulses.  No extrasystoles are present. PMI is not displaced. Exam reveals no gallop, no S3, no S4, no distant heart sounds and no friction rub.   No murmur heard.   No systolic murmur is present.   No diastolic murmur is present.  Pulmonary/Chest: Effort normal and breath sounds normal. No accessory muscle usage or stridor. No tachypnea. No respiratory distress. She has no decreased breath sounds. She has no wheezes. She has no rhonchi. She has no rales.   Abdominal: Soft. Bowel sounds are normal. She exhibits no distension, no ascites and no mass. There is no splenomegaly or hepatomegaly. There is no tenderness. There is no rebound and no guarding.   Musculoskeletal:        Right lower leg: Normal. She exhibits no tenderness and no edema.        Left lower leg: Normal. She exhibits no tenderness and no edema.   Lymphadenopathy:     She has no cervical adenopathy.   Neurological: She is alert and oriented to person, place, and time. She is not disoriented. No cranial nerve deficit.   Skin: Skin is warm and dry. Capillary refill takes less than 2 seconds. No ecchymosis and no rash noted. She is not diaphoretic. No erythema. No pallor. Nails show no clubbing.   Psychiatric: She has a normal mood and affect. Her behavior is normal. Her mood appears not anxious. She is not agitated.   Nursing note and vitals reviewed.      Procedures           ED Course  ED Course as of Apr 01 1848 Wed Apr 01, 2020 1730 Twelve-lead from EMS shows ventricular paced rhythm.    [EG]   1735 Right  70 bpm atrial paced rhythm DC interval 220 ms Q RS duration 90 ms QT/QTc 358/386 ms there are T wave inversions in V3 V4 V5 and V6 which are new and not seen on previous EKGs compared to her last EKG in March of this year.  There are also inverted T waves in leads II, III and aVF.  The patient is asymptomatic at this time besides some mild back pain.    [EG]   1817 Patient is resting comfortably at this time.  No complaints.    [EG]   1824 No clinical signs of pneumonia at this time.    [EG]      ED Course User Index  [EG] Tanisha Blake DO                                         HEART Score (for prediction of 6-week risk of major adverse cardiac event) reviewed and/or performed as part of the patient evaluation and treatment planning process.  The result associated with this review/performance is: 3       MDM  Number of Diagnoses or Management Options  Chest pain, unspecified type: new and requires workup  EKG abnormalities: new and requires workup  Pneumonia of right upper lobe due to infectious organism (CMS/HCC): new and requires workup     Amount and/or Complexity of Data Reviewed  Clinical lab tests: reviewed and ordered  Tests in the radiology section of CPT®: ordered and reviewed  Tests in the medicine section of CPT®: ordered and reviewed  Independent visualization of images, tracings, or specimens: yes    Risk of Complications, Morbidity, and/or Mortality  Presenting problems: high  Diagnostic procedures: high  Management options: high    Patient Progress  Patient progress: stable      Final diagnoses:   EKG abnormalities   Chest pain, unspecified type   Pneumonia of right upper lobe due to infectious organism (CMS/HCC)            Tanisha Blake DO  04/01/20 [id number]

## 2020-04-01 NOTE — ED NOTES
Dr. Blake advised to cancel One Push activation. House Supervisor was called at this time to cancel one push.      Yahaira Jarrett  04/01/20 9513

## 2020-04-01 NOTE — ED NOTES
[facility] done a call in for A ONE PUSH/ [facility] alert. Dr. Salas advised to go ahead with the One Push, One push activated at this time.     Yahaira Jarrett  04/01/20 [id number]

## 2020-04-02 ENCOUNTER — APPOINTMENT (OUTPATIENT)
Dept: CARDIOLOGY | Facility: HOSPITAL | Age: 60
End: 2020-04-02

## 2020-04-02 ENCOUNTER — APPOINTMENT (OUTPATIENT)
Dept: NUCLEAR MEDICINE | Facility: HOSPITAL | Age: 60
End: 2020-04-02

## 2020-04-02 ENCOUNTER — TELEPHONE (OUTPATIENT)
Dept: CARDIOLOGY | Facility: CLINIC | Age: 60
End: 2020-04-02

## 2020-04-02 ENCOUNTER — APPOINTMENT (OUTPATIENT)
Dept: ULTRASOUND IMAGING | Facility: HOSPITAL | Age: 60
End: 2020-04-02

## 2020-04-02 LAB
ALBUMIN SERPL-MCNC: 3.79 G/DL (ref 3.5–5.2)
ALBUMIN/GLOB SERPL: 1.5 G/DL
ALP SERPL-CCNC: 61 U/L (ref 39–117)
ALT SERPL W P-5'-P-CCNC: 14 U/L (ref 1–33)
ANION GAP SERPL CALCULATED.3IONS-SCNC: 11 MMOL/L (ref 5–15)
AST SERPL-CCNC: 16 U/L (ref 1–32)
BASOPHILS # BLD AUTO: 0.07 10*3/MM3 (ref 0–0.2)
BASOPHILS NFR BLD AUTO: 1 % (ref 0–1.5)
BH CV ECHO MEAS - % IVS THICK: -9.6 %
BH CV ECHO MEAS - % LVPW THICK: 23.2 %
BH CV ECHO MEAS - BSA(HAYCOCK): 2.1 M^2
BH CV ECHO MEAS - BSA: 2 M^2
BH CV ECHO MEAS - BZI_BMI: 31 KILOGRAMS/M^2
BH CV ECHO MEAS - BZI_METRIC_HEIGHT: 170.2 CM
BH CV ECHO MEAS - BZI_METRIC_WEIGHT: 89.8 KG
BH CV ECHO MEAS - EDV(CUBED): 99.6 ML
BH CV ECHO MEAS - EDV(MOD-SP4): 61.1 ML
BH CV ECHO MEAS - EDV(TEICH): 99.1 ML
BH CV ECHO MEAS - EF(CUBED): 71.8 %
BH CV ECHO MEAS - EF(MOD-SP4): 65.3 %
BH CV ECHO MEAS - EF(TEICH): 63.5 %
BH CV ECHO MEAS - ESV(CUBED): 28.1 ML
BH CV ECHO MEAS - ESV(MOD-SP4): 21.2 ML
BH CV ECHO MEAS - ESV(TEICH): 36.2 ML
BH CV ECHO MEAS - FS: 34.4 %
BH CV ECHO MEAS - IVS/LVPW: 1.2
BH CV ECHO MEAS - IVSD: 1.2 CM
BH CV ECHO MEAS - IVSS: 1 CM
BH CV ECHO MEAS - LV DIASTOLIC VOL/BSA (35-75): 30.3 ML/M^2
BH CV ECHO MEAS - LV MASS(C)D: 173.7 GRAMS
BH CV ECHO MEAS - LV MASS(C)DI: 86.3 GRAMS/M^2
BH CV ECHO MEAS - LV MASS(C)S: 99 GRAMS
BH CV ECHO MEAS - LV MASS(C)SI: 49.2 GRAMS/M^2
BH CV ECHO MEAS - LV SYSTOLIC VOL/BSA (12-30): 10.5 ML/M^2
BH CV ECHO MEAS - LVIDD: 4.6 CM
BH CV ECHO MEAS - LVIDS: 3 CM
BH CV ECHO MEAS - LVLD AP4: 7.1 CM
BH CV ECHO MEAS - LVLS AP4: 5.9 CM
BH CV ECHO MEAS - LVPWD: 0.97 CM
BH CV ECHO MEAS - LVPWS: 1.2 CM
BH CV ECHO MEAS - SI(CUBED): 35.5 ML/M^2
BH CV ECHO MEAS - SI(MOD-SP4): 19.8 ML/M^2
BH CV ECHO MEAS - SI(TEICH): 31.3 ML/M^2
BH CV ECHO MEAS - SV(CUBED): 71.5 ML
BH CV ECHO MEAS - SV(MOD-SP4): 39.9 ML
BH CV ECHO MEAS - SV(TEICH): 62.9 ML
BH CV NUCLEAR PRIOR STUDY: 3
BH CV STRESS BP STAGE 1: NORMAL
BH CV STRESS BP STAGE 2: NORMAL
BH CV STRESS COMMENTS STAGE 1: NORMAL
BH CV STRESS COMMENTS STAGE 2: NORMAL
BH CV STRESS DOSE REGADENOSON STAGE 1: 0.4
BH CV STRESS DURATION MIN STAGE 1: 0
BH CV STRESS DURATION MIN STAGE 2: 4
BH CV STRESS DURATION SEC STAGE 1: 10
BH CV STRESS DURATION SEC STAGE 2: 0
BH CV STRESS HR STAGE 1: 74
BH CV STRESS HR STAGE 2: 75
BH CV STRESS PROTOCOL 1: NORMAL
BH CV STRESS RECOVERY BP: NORMAL MMHG
BH CV STRESS RECOVERY HR: 82 BPM
BH CV STRESS STAGE 1: 1
BH CV STRESS STAGE 2: 2
BILIRUB SERPL-MCNC: 0.2 MG/DL (ref 0.2–1.2)
BUN BLD-MCNC: 11 MG/DL (ref 8–23)
BUN/CREAT SERPL: 13.4 (ref 7–25)
CALCIUM SPEC-SCNC: 8.9 MG/DL (ref 8.6–10.5)
CHLORIDE SERPL-SCNC: 108 MMOL/L (ref 98–107)
CHOLEST SERPL-MCNC: 182 MG/DL (ref 0–200)
CO2 SERPL-SCNC: 22 MMOL/L (ref 22–29)
CREAT BLD-MCNC: 0.82 MG/DL (ref 0.57–1)
DEPRECATED RDW RBC AUTO: 46.1 FL (ref 37–54)
EOSINOPHIL # BLD AUTO: 0.28 10*3/MM3 (ref 0–0.4)
EOSINOPHIL NFR BLD AUTO: 3.8 % (ref 0.3–6.2)
ERYTHROCYTE [DISTWIDTH] IN BLOOD BY AUTOMATED COUNT: 13.6 % (ref 12.3–15.4)
GFR SERPL CREATININE-BSD FRML MDRD: 71 ML/MIN/1.73
GLOBULIN UR ELPH-MCNC: 2.5 GM/DL
GLUCOSE BLD-MCNC: 99 MG/DL (ref 65–99)
GLUCOSE BLDC GLUCOMTR-MCNC: 102 MG/DL (ref 70–130)
GLUCOSE BLDC GLUCOMTR-MCNC: 104 MG/DL (ref 70–130)
GLUCOSE BLDC GLUCOMTR-MCNC: 107 MG/DL (ref 70–130)
GLUCOSE BLDC GLUCOMTR-MCNC: 109 MG/DL (ref 70–130)
GLUCOSE BLDC GLUCOMTR-MCNC: 98 MG/DL (ref 70–130)
HCT VFR BLD AUTO: 39.6 % (ref 34–46.6)
HDLC SERPL-MCNC: 56 MG/DL (ref 40–60)
HGB BLD-MCNC: 12.5 G/DL (ref 12–15.9)
IMM GRANULOCYTES # BLD AUTO: 0.03 10*3/MM3 (ref 0–0.05)
IMM GRANULOCYTES NFR BLD AUTO: 0.4 % (ref 0–0.5)
LDLC SERPL CALC-MCNC: 101 MG/DL (ref 0–100)
LDLC/HDLC SERPL: 1.8 {RATIO}
LV EF NUC BP: 61 %
LYMPHOCYTES # BLD AUTO: 2.65 10*3/MM3 (ref 0.7–3.1)
LYMPHOCYTES NFR BLD AUTO: 36.2 % (ref 19.6–45.3)
MAXIMAL PREDICTED HEART RATE: 160 BPM
MCH RBC QN AUTO: 28.9 PG (ref 26.6–33)
MCHC RBC AUTO-ENTMCNC: 31.6 G/DL (ref 31.5–35.7)
MCV RBC AUTO: 91.5 FL (ref 79–97)
MONOCYTES # BLD AUTO: 0.59 10*3/MM3 (ref 0.1–0.9)
MONOCYTES NFR BLD AUTO: 8 % (ref 5–12)
NEUTROPHILS # BLD AUTO: 3.71 10*3/MM3 (ref 1.7–7)
NEUTROPHILS NFR BLD AUTO: 50.6 % (ref 42.7–76)
NRBC BLD AUTO-RTO: 0 /100 WBC (ref 0–0.2)
PERCENT MAX PREDICTED HR: 50.63 %
PLATELET # BLD AUTO: 205 10*3/MM3 (ref 140–450)
PMV BLD AUTO: 9.8 FL (ref 6–12)
POTASSIUM BLD-SCNC: 3.8 MMOL/L (ref 3.5–5.2)
PROT SERPL-MCNC: 6.3 G/DL (ref 6–8.5)
RBC # BLD AUTO: 4.33 10*6/MM3 (ref 3.77–5.28)
SODIUM BLD-SCNC: 141 MMOL/L (ref 136–145)
STRESS BASELINE BP: NORMAL MMHG
STRESS BASELINE HR: 70 BPM
STRESS PERCENT HR: 60 %
STRESS POST PEAK BP: NORMAL MMHG
STRESS POST PEAK HR: 81 BPM
STRESS TARGET HR: 136 BPM
TRIGL SERPL-MCNC: 126 MG/DL (ref 0–150)
TROPONIN T SERPL-MCNC: <0.01 NG/ML (ref 0–0.03)
TROPONIN T SERPL-MCNC: <0.01 NG/ML (ref 0–0.03)
VLDLC SERPL-MCNC: 25.2 MG/DL
WBC NRBC COR # BLD: 7.33 10*3/MM3 (ref 3.4–10.8)

## 2020-04-02 PROCEDURE — 85025 COMPLETE CBC W/AUTO DIFF WBC: CPT | Performed by: HOSPITALIST

## 2020-04-02 PROCEDURE — 93018 CV STRESS TEST I&R ONLY: CPT | Performed by: INTERNAL MEDICINE

## 2020-04-02 PROCEDURE — 84484 ASSAY OF TROPONIN QUANT: CPT | Performed by: HOSPITALIST

## 2020-04-02 PROCEDURE — 99225 PR SBSQ OBSERVATION CARE/DAY 25 MINUTES: CPT | Performed by: INTERNAL MEDICINE

## 2020-04-02 PROCEDURE — 78452 HT MUSCLE IMAGE SPECT MULT: CPT

## 2020-04-02 PROCEDURE — 82962 GLUCOSE BLOOD TEST: CPT

## 2020-04-02 PROCEDURE — G0378 HOSPITAL OBSERVATION PER HR: HCPCS

## 2020-04-02 PROCEDURE — 80061 LIPID PANEL: CPT | Performed by: HOSPITALIST

## 2020-04-02 PROCEDURE — 93010 ELECTROCARDIOGRAM REPORT: CPT | Performed by: SPECIALIST

## 2020-04-02 PROCEDURE — 78452 HT MUSCLE IMAGE SPECT MULT: CPT | Performed by: INTERNAL MEDICINE

## 2020-04-02 PROCEDURE — 25010000002 REGADENOSON 0.4 MG/5ML SOLUTION: Performed by: INTERNAL MEDICINE

## 2020-04-02 PROCEDURE — 93880 EXTRACRANIAL BILAT STUDY: CPT

## 2020-04-02 PROCEDURE — 93005 ELECTROCARDIOGRAM TRACING: CPT | Performed by: INTERNAL MEDICINE

## 2020-04-02 PROCEDURE — A9500 TC99M SESTAMIBI: HCPCS | Performed by: INTERNAL MEDICINE

## 2020-04-02 PROCEDURE — 93308 TTE F-UP OR LMTD: CPT

## 2020-04-02 PROCEDURE — 94799 UNLISTED PULMONARY SVC/PX: CPT

## 2020-04-02 PROCEDURE — 80053 COMPREHEN METABOLIC PANEL: CPT | Performed by: HOSPITALIST

## 2020-04-02 PROCEDURE — 93017 CV STRESS TEST TRACING ONLY: CPT

## 2020-04-02 PROCEDURE — 93880 EXTRACRANIAL BILAT STUDY: CPT | Performed by: RADIOLOGY

## 2020-04-02 PROCEDURE — 99244 OFF/OP CNSLTJ NEW/EST MOD 40: CPT | Performed by: SPECIALIST

## 2020-04-02 PROCEDURE — 93306 TTE W/DOPPLER COMPLETE: CPT | Performed by: INTERNAL MEDICINE

## 2020-04-02 PROCEDURE — 93005 ELECTROCARDIOGRAM TRACING: CPT | Performed by: HOSPITALIST

## 2020-04-02 PROCEDURE — 93005 ELECTROCARDIOGRAM TRACING: CPT | Performed by: NURSE PRACTITIONER

## 2020-04-02 PROCEDURE — 0 TECHNETIUM SESTAMIBI: Performed by: INTERNAL MEDICINE

## 2020-04-02 PROCEDURE — 93321 DOPPLER ECHO F-UP/LMTD STD: CPT

## 2020-04-02 RX ORDER — METHOCARBAMOL 500 MG/1
500 TABLET, FILM COATED ORAL 3 TIMES DAILY
Status: CANCELLED | OUTPATIENT
Start: 2020-04-02

## 2020-04-02 RX ORDER — METHOCARBAMOL 500 MG/1
500 TABLET, FILM COATED ORAL 2 TIMES DAILY PRN
Status: DISCONTINUED | OUTPATIENT
Start: 2020-04-02 | End: 2020-04-03 | Stop reason: HOSPADM

## 2020-04-02 RX ORDER — ESCITALOPRAM OXALATE 10 MG/1
10 TABLET ORAL DAILY
Status: DISCONTINUED | OUTPATIENT
Start: 2020-04-03 | End: 2020-04-03 | Stop reason: HOSPADM

## 2020-04-02 RX ORDER — CARVEDILOL 6.25 MG/1
6.25 TABLET ORAL 2 TIMES DAILY
Status: DISCONTINUED | OUTPATIENT
Start: 2020-04-02 | End: 2020-04-02

## 2020-04-02 RX ORDER — POLYETHYLENE GLYCOL 3350 17 G/17G
17 POWDER, FOR SOLUTION ORAL DAILY
Status: DISCONTINUED | OUTPATIENT
Start: 2020-04-03 | End: 2020-04-03 | Stop reason: HOSPADM

## 2020-04-02 RX ORDER — TIZANIDINE 4 MG/1
4 TABLET ORAL EVERY 8 HOURS PRN
COMMUNITY
End: 2020-06-26

## 2020-04-02 RX ORDER — HYDROCODONE BITARTRATE AND ACETAMINOPHEN 5; 325 MG/1; MG/1
1 TABLET ORAL EVERY 6 HOURS PRN
Status: DISCONTINUED | OUTPATIENT
Start: 2020-04-02 | End: 2020-04-03 | Stop reason: HOSPADM

## 2020-04-02 RX ORDER — PRAVASTATIN SODIUM 40 MG
80 TABLET ORAL NIGHTLY
Status: CANCELLED | OUTPATIENT
Start: 2020-04-02

## 2020-04-02 RX ORDER — PANTOPRAZOLE SODIUM 40 MG/1
40 TABLET, DELAYED RELEASE ORAL EVERY MORNING
Status: DISCONTINUED | OUTPATIENT
Start: 2020-04-03 | End: 2020-04-03 | Stop reason: HOSPADM

## 2020-04-02 RX ORDER — ESCITALOPRAM OXALATE 10 MG/1
10 TABLET ORAL DAILY
COMMUNITY

## 2020-04-02 RX ORDER — ISOSORBIDE MONONITRATE 30 MG/1
30 TABLET, EXTENDED RELEASE ORAL DAILY
Status: DISCONTINUED | OUTPATIENT
Start: 2020-04-03 | End: 2020-04-03 | Stop reason: HOSPADM

## 2020-04-02 RX ORDER — ALBUTEROL SULFATE 2.5 MG/3ML
2.5 SOLUTION RESPIRATORY (INHALATION) EVERY 4 HOURS PRN
Status: CANCELLED | OUTPATIENT
Start: 2020-04-01

## 2020-04-02 RX ORDER — RANITIDINE 150 MG/1
150 TABLET ORAL EVERY EVENING
COMMUNITY

## 2020-04-02 RX ORDER — BUSPIRONE HYDROCHLORIDE 10 MG/1
10 TABLET ORAL 3 TIMES DAILY
Status: CANCELLED | OUTPATIENT
Start: 2020-04-02

## 2020-04-02 RX ORDER — PROMETHAZINE HYDROCHLORIDE 25 MG/1
25 TABLET ORAL 3 TIMES DAILY PRN
COMMUNITY

## 2020-04-02 RX ORDER — POTASSIUM CHLORIDE 750 MG/1
10 TABLET, FILM COATED, EXTENDED RELEASE ORAL 2 TIMES DAILY WITH MEALS
Status: DISCONTINUED | OUTPATIENT
Start: 2020-04-02 | End: 2020-04-03 | Stop reason: HOSPADM

## 2020-04-02 RX ORDER — NITROGLYCERIN 0.4 MG/1
0.4 TABLET SUBLINGUAL
COMMUNITY
End: 2020-05-28 | Stop reason: SDUPTHER

## 2020-04-02 RX ORDER — GABAPENTIN 300 MG/1
300 CAPSULE ORAL EVERY 12 HOURS SCHEDULED
Status: DISCONTINUED | OUTPATIENT
Start: 2020-04-02 | End: 2020-04-03 | Stop reason: HOSPADM

## 2020-04-02 RX ORDER — PROMETHAZINE HYDROCHLORIDE 25 MG/1
25 TABLET ORAL 3 TIMES DAILY PRN
Status: DISCONTINUED | OUTPATIENT
Start: 2020-04-02 | End: 2020-04-03 | Stop reason: HOSPADM

## 2020-04-02 RX ORDER — TOPIRAMATE 100 MG/1
100 CAPSULE, EXTENDED RELEASE ORAL 2 TIMES DAILY
Status: CANCELLED | OUTPATIENT
Start: 2020-04-02

## 2020-04-02 RX ORDER — ISOSORBIDE MONONITRATE 30 MG/1
30 TABLET, EXTENDED RELEASE ORAL DAILY
COMMUNITY
End: 2020-05-28 | Stop reason: SDUPTHER

## 2020-04-02 RX ORDER — SOTALOL HYDROCHLORIDE 80 MG/1
80 TABLET ORAL EVERY 12 HOURS SCHEDULED
Status: DISCONTINUED | OUTPATIENT
Start: 2020-04-02 | End: 2020-04-03 | Stop reason: HOSPADM

## 2020-04-02 RX ORDER — FLUTICASONE PROPIONATE 50 MCG
2 SPRAY, SUSPENSION (ML) NASAL DAILY
Status: DISCONTINUED | OUTPATIENT
Start: 2020-04-03 | End: 2020-04-03 | Stop reason: HOSPADM

## 2020-04-02 RX ORDER — DOCUSATE SODIUM 100 MG/1
100 CAPSULE, LIQUID FILLED ORAL 2 TIMES DAILY PRN
Status: DISCONTINUED | OUTPATIENT
Start: 2020-04-02 | End: 2020-04-03 | Stop reason: HOSPADM

## 2020-04-02 RX ORDER — BUSPIRONE HYDROCHLORIDE 10 MG/1
10 TABLET ORAL 3 TIMES DAILY PRN
Status: DISCONTINUED | OUTPATIENT
Start: 2020-04-02 | End: 2020-04-03 | Stop reason: HOSPADM

## 2020-04-02 RX ORDER — FUROSEMIDE 20 MG/1
20 TABLET ORAL DAILY
Status: DISCONTINUED | OUTPATIENT
Start: 2020-04-03 | End: 2020-04-03 | Stop reason: HOSPADM

## 2020-04-02 RX ORDER — ALBUTEROL SULFATE 2.5 MG/3ML
2.5 SOLUTION RESPIRATORY (INHALATION) 4 TIMES DAILY PRN
Status: DISCONTINUED | OUTPATIENT
Start: 2020-04-02 | End: 2020-04-02

## 2020-04-02 RX ORDER — BROMOCRIPTINE MESYLATE 2.5 MG/1
2.5 TABLET ORAL DAILY
Status: DISCONTINUED | OUTPATIENT
Start: 2020-04-03 | End: 2020-04-03 | Stop reason: HOSPADM

## 2020-04-02 RX ORDER — CETIRIZINE HYDROCHLORIDE 10 MG/1
10 TABLET ORAL DAILY
Status: DISCONTINUED | OUTPATIENT
Start: 2020-04-03 | End: 2020-04-03 | Stop reason: HOSPADM

## 2020-04-02 RX ORDER — CLONAZEPAM 0.5 MG/1
0.25 TABLET ORAL 2 TIMES DAILY PRN
Status: DISCONTINUED | OUTPATIENT
Start: 2020-04-02 | End: 2020-04-03 | Stop reason: HOSPADM

## 2020-04-02 RX ORDER — TOPIRAMATE 100 MG/1
100 TABLET, FILM COATED ORAL DAILY
COMMUNITY

## 2020-04-02 RX ORDER — POLYETHYLENE GLYCOL 3350 17 G/17G
17 POWDER, FOR SOLUTION ORAL DAILY
COMMUNITY

## 2020-04-02 RX ORDER — POTASSIUM CHLORIDE 750 MG/1
10 TABLET, EXTENDED RELEASE ORAL 2 TIMES DAILY
COMMUNITY
End: 2020-08-27 | Stop reason: SDUPTHER

## 2020-04-02 RX ORDER — METOPROLOL SUCCINATE 50 MG/1
50 TABLET, EXTENDED RELEASE ORAL
Status: DISCONTINUED | OUTPATIENT
Start: 2020-04-02 | End: 2020-04-02

## 2020-04-02 RX ORDER — FAMOTIDINE 20 MG/1
20 TABLET, FILM COATED ORAL DAILY
Status: DISCONTINUED | OUTPATIENT
Start: 2020-04-03 | End: 2020-04-03 | Stop reason: HOSPADM

## 2020-04-02 RX ORDER — ALBUTEROL SULFATE 90 UG/1
2 AEROSOL, METERED RESPIRATORY (INHALATION) 4 TIMES DAILY PRN
COMMUNITY

## 2020-04-02 RX ORDER — TIZANIDINE 4 MG/1
4 TABLET ORAL EVERY 8 HOURS PRN
Status: DISCONTINUED | OUTPATIENT
Start: 2020-04-02 | End: 2020-04-03 | Stop reason: HOSPADM

## 2020-04-02 RX ORDER — CETIRIZINE HYDROCHLORIDE 10 MG/1
10 TABLET ORAL DAILY
COMMUNITY

## 2020-04-02 RX ORDER — TRAZODONE HYDROCHLORIDE 50 MG/1
25 TABLET ORAL NIGHTLY PRN
Status: DISCONTINUED | OUTPATIENT
Start: 2020-04-02 | End: 2020-04-03 | Stop reason: HOSPADM

## 2020-04-02 RX ADMIN — REGADENOSON 0.4 MG: 0.08 INJECTION, SOLUTION INTRAVENOUS at 11:24

## 2020-04-02 RX ADMIN — APIXABAN 5 MG: 5 TABLET, FILM COATED ORAL at 22:58

## 2020-04-02 RX ADMIN — SOTALOL HYDROCHLORIDE 80 MG: 80 TABLET ORAL at 12:37

## 2020-04-02 RX ADMIN — ASPIRIN 81 MG: 81 TABLET, COATED ORAL at 08:39

## 2020-04-02 RX ADMIN — SOTALOL HYDROCHLORIDE 80 MG: 80 TABLET ORAL at 20:30

## 2020-04-02 RX ADMIN — HYDROCODONE BITARTRATE AND ACETAMINOPHEN 1 TABLET: 5; 325 TABLET ORAL at 21:20

## 2020-04-02 RX ADMIN — PRAVASTATIN SODIUM 80 MG: 40 TABLET ORAL at 20:30

## 2020-04-02 RX ADMIN — TECHNETIUM TC 99M SESTAMIBI 1 DOSE: 1 INJECTION INTRAVENOUS at 11:24

## 2020-04-02 RX ADMIN — TECHNETIUM TC 99M SESTAMIBI 1 DOSE: 1 INJECTION INTRAVENOUS at 10:05

## 2020-04-02 RX ADMIN — SODIUM CHLORIDE, PRESERVATIVE FREE 10 ML: 5 INJECTION INTRAVENOUS at 20:31

## 2020-04-02 RX ADMIN — POTASSIUM CHLORIDE 10 MEQ: 750 TABLET, FILM COATED, EXTENDED RELEASE ORAL at 22:58

## 2020-04-02 RX ADMIN — SODIUM CHLORIDE, PRESERVATIVE FREE 10 ML: 5 INJECTION INTRAVENOUS at 08:39

## 2020-04-02 RX ADMIN — CLOPIDOGREL 75 MG: 75 TABLET, FILM COATED ORAL at 08:39

## 2020-04-02 RX ADMIN — GABAPENTIN 300 MG: 300 CAPSULE ORAL at 21:20

## 2020-04-02 NOTE — PLAN OF CARE
Pt resting in bed peacefully. Pt has denied any episodes of chest pain since admission. Denies any other needs at this time. Pt has been NPO by mouth except with meds. Glucose within desired limits. Tele monitor in place. Call light in reach. Will continue to monitor.

## 2020-04-02 NOTE — CONSULTS
Date of Admit: 4/1/2020  Date of Consult: 04/02/20  No ref. provider found        Unstable angina (CMS/[facility])      Assessment      1. Chest pain, troponin negative, EKG tracing reviewed and shows diffuse ST and T wave changes   2. Paroxysmal atrial fibrillation, S/P catheter ablation in 2014, on eliquis, follows with Dr. Borja in [place]    3. Essential hypertension, controlled   4. Sick sinus syndrome, S/P PPM implantation with [place] device in 2010   5. Dyslipidemia, on statin therapy   6. Diabetes mellitus type 2   7. History of SVT  8. Sleep Apnea  9. Secondhand smoke exposure     Recommendations     1. Patient's chest pain is atypical she is tender over her spine and this radiates to the chest pain is worse with moving torso however her EKG was somewhat abnormal with T wave inversion so we will proceed with stress testing to assess for ischemia  2. Currently she is in sinus mechanism with paced atrial response continue current medications  3.  With paroxysmal atrial fibrillation we will give a trial of sotalol  4.  Patient can be monitored as an outpatient      Reason for consultation: Chest pain     Subjective       Subjective     History of Present Illness     Lian Kang is a 59 year old female admitted for chest pain. Past medical history significant for paroxysmal atrial fibrillation, diabetes mellitus, hypertension, dyslipidemia, sleep apnea and sick sinus syndrome S/P permanent pacemaker implantation. She presented to the ED secondary to chest pain. She states it began yesterday morning while she was sitting around. She described it as a dull pain in the middle of her chest that radiated to her upper back. She had associated shortness of breath but denied diaphoresis. She denies any aggravating factors.  She took 2 sublingual nitroglycerin and the pain was relieved. She rated the chest pain as a 10/10 on a pain scale. She has chronic lower back pain and reports that the day before her chest pain  started her back pain worsened. She does have a history of atrial fibrillation and reports intermittent palpitations. She takes eliquis at home and denies any bleeding problems.  She does report worsening shortness of breath with minimal exertion that has been going on for about 2-3 months. She is not a current smoker but does live with her  who smokes 2 packs a day for the last 32 years in her home.  She denies any history of coronary artery disease. Per chart review she had a left heart catheterization in 2010 that shows normal coronary arteries. Her last stress test per her report was 2 years ago. She is not a smoker. She follows with Dr. Faustin for cardiology and Dr. Borja for EP.     Cardiac risk factors:diabetes mellitus, hypercholesterolemia, hypertension and Sedentary life style    Last Echo: 4/2/2020  · Normal left ventricular cavity size and wall thickness noted. All left ventricular wall segments contract normally.  · Estimated EF appears to be in the range of 61 - 65%.  · The aortic valve appears trileaflet. The aortic valve is abnormal in structure. There is mild-to-moderate thickening of the noncoronary cusp, left coronary cusp and right coronary cusp of the aortic valve. No aortic valve stenosis is present.  · The mitral valve is normal in structure. No significant mitral valve stenosis is present.  · There is no evidence of pericardial effusion.    Past Medical History:   Diagnosis Date   • Anxiety and depression    • Atypical chest pain    • Diabetes mellitus (CMS/HCC)    • Dyslipidemia    • GERD (gastroesophageal reflux disease)    • H/O valvular heart disease     Echocardiogram 2013 revealing EF 60%. Mild left atrial enlargement, trace AI, trace MR, mild TR.   • Hypertension    • Insomnia    • Migraine headache     Chronic migraine headaches/Topamax therapy.    • Moderate obesity    • AMANDEEP on CPAP    • Pituitary adenoma (CMS/HCC)     History of pituitary adenoma/bromocriptine therapy.    •  Sinus bradycardia     Severe symptomatic bradycardia secondary to sinus node dysfunction and sinus bradycardia with pacemaker at elective replacement indicator.   • SVT (supraventricular tachycardia) (CMS/HCC)     With prior ablation.     Past Surgical History:   Procedure Laterality Date   • PACEMAKER IMPLANTATION  2010    Apparent reported symptomatic bradycardia on metoprolol therapy/status post dual chamber [place] permanent pacemaker implantation by Dr. Michael Mcknight in 2010.     Family History   Problem Relation Age of Onset   • Heart disease Mother    • Heart failure Mother    • Heart disease Father    • Heart failure Father      Social History     Tobacco Use   • Smoking status: Never Smoker   • Smokeless tobacco: Never Used   Substance Use Topics   • Alcohol use: No   • Drug use: No     Medications Prior to Admission   Medication Sig Dispense Refill Last Dose   • apixaban (ELIQUIS) 5 MG tablet tablet Take 5 mg by mouth 2 (Two) Times a Day.   4/1/2020 at 0800   • bromocriptine (PARLODEL) 2.5 MG tablet Take 2.5 mg by mouth Daily.   4/1/2020 at 0800   • busPIRone (BUSPAR) 10 MG tablet Take 10 mg by mouth 3 (Three) Times a Day.   4/1/2020 at 0800   • carvedilol (COREG) 6.25 MG tablet Take 1 tablet by mouth 2 (Two) Times a Day. 180 tablet 3 4/1/2020 at 0800   • clonazePAM (KlonoPIN) 0.5 MG tablet Take 0.25 mg by mouth 3 (Three) Times a Day As Needed for Anxiety.   4/1/2020 at 0800   • docusate sodium (COLACE) 100 MG capsule Take 100 mg by mouth 2 (Two) Times a Day As Needed for Constipation.   3/31/2020 at Unknown time   • fluticasone (FLONASE) 50 MCG/ACT nasal spray 2 sprays into the nostril(s) as directed by provider Daily.   4/1/2020 at 0800   • furosemide (LASIX) 20 MG tablet Take 20 mg by mouth daily.   4/1/2020 at 0800   • gabapentin (NEURONTIN) 300 MG capsule Take 300 mg by mouth 3 (Three) Times a Day.   4/1/2020 at 0800   • HYDROcodone-acetaminophen (NORCO) 5-325 MG per tablet Take 1 tablet by mouth  Every 6 (Six) Hours As Needed for Moderate Pain .   4/1/2020 at 0800   • levalbuterol (XOPENEX HFA) 45 MCG/ACT inhaler Inhale 1-2 puffs Every 4 (Four) Hours As Needed for Wheezing.   3/31/2020 at Unknown time   • metFORMIN (GLUCOPHAGE) 500 MG tablet Take 500 mg by mouth Daily With Breakfast.   4/1/2020 at 0800   • methocarbamol (ROBAXIN) 500 MG tablet Take 500 mg by mouth 3 (Three) Times a Day.   4/1/2020 at 0800   • pantoprazole (PROTONIX) 40 MG EC tablet Take 40 mg by mouth Daily.   4/1/2020 at 0800   • pravastatin (PRAVACHOL) 80 MG tablet Take 80 mg by mouth Every Night.   3/31/2020 at Unknown time   • sertraline (ZOLOFT) 100 MG tablet Take 1 tablet by mouth Daily. 90 tablet 1 4/1/2020 at 0800   • Topiramate  MG capsule extended-release 24 hour sprinkle Take 100 mg by mouth 2 (Two) Times a Day.   4/1/2020 at 0800   • traZODone (DESYREL) 50 MG tablet Take 50 mg by mouth Every Night.   3/31/2020 at Unknown time   • Umeclidinium Bromide (INCRUSE ELLIPTA) 62.5 MCG/INH aerosol powder  Inhale 1 puff Daily.   4/1/2020 at 0800     Allergies:  Patient has no known allergies.    Review of Systems   Constitutional: Positive for unexpected weight change. Negative for chills, diaphoresis and fatigue.   HENT: Negative for congestion and trouble swallowing.    Eyes: Negative for photophobia, redness and visual disturbance.   Respiratory: Positive for chest tightness and shortness of breath. Negative for wheezing.    Cardiovascular: Positive for chest pain, palpitations and leg swelling.   Gastrointestinal: Negative for abdominal distention, abdominal pain, nausea and vomiting.   Endocrine: Negative for polyphagia and polyuria.   Genitourinary: Negative for dysuria and hematuria.   Musculoskeletal: Positive for back pain. Negative for neck pain and neck stiffness.   Skin: Negative for rash and wound.   Allergic/Immunologic: Negative for food allergies and immunocompromised state.   Neurological: Positive for dizziness.  Negative for syncope, weakness and light-headedness.   Hematological: Does not bruise/bleed easily.   Psychiatric/Behavioral: Negative for confusion and suicidal ideas.     Objective       Objective      Vital Signs  Temp:  [97.5 °F (36.4 °C)-99 °F (37.2 °C)] 98 °F (36.7 °C)  Heart Rate:  [] 105  Resp:  [18-20] 18  BP: (105-148)/() 135/95     Vital Signs (last 72 hrs)       03/30 0700  -  03/31 0659 03/31 0700  -  04/01 0659 04/01 0700  -  04/02 0659 04/02 0700  -  04/02 0845   Most Recent    Temp (°F)     97.5 -  99      98     98 (36.7)    Heart Rate     70 -  99    80 -  119     105    Resp     18 -  20      18     18    BP     105/65 -  148/92    135/95 -  147/105     135/95    SpO2 (%)     93 -  100      98     98        Body mass index is 31.03 kg/m².    Intake/Output Summary (Last 24 hours) at 4/2/2020 0845  Last data filed at 4/2/2020 0843  Gross per 24 hour   Intake 1000 ml   Output --   Net 1000 ml     Physical Exam   Constitutional: She is oriented to person, place, and time. She appears well-developed and well-nourished.   HENT:   Head: Normocephalic and atraumatic.   Neck: Normal range of motion.   Cardiovascular: Normal rate, regular rhythm and normal heart sounds.   No murmur heard.  Pulses:       Dorsalis pedis pulses are 1+ on the right side, and 1+ on the left side.        Posterior tibial pulses are 2+ on the right side, and 2+ on the left side.   Pulmonary/Chest: Effort normal and breath sounds normal. No respiratory distress. She has no wheezes.   Abdominal: Soft. Bowel sounds are normal. She exhibits no distension. There is no tenderness.   Musculoskeletal: She exhibits no edema.   Neurological: She is alert and oriented to person, place, and time.   Psychiatric: She has a normal mood and affect. Her behavior is normal.     Results review     Results Review:    I reviewed the patient's new clinical results.  Results from last 7 days   Lab Units 04/02/20  0737 04/02/20  0037  04/01/20  1931 04/01/20  1732   TROPONIN T ng/mL <0.010 <0.010 <0.010 <0.010     Results from last 7 days   Lab Units 04/02/20  0037 04/01/20  1732   WBC 10*3/mm3 7.33 7.97   HEMOGLOBIN g/dL 12.5 13.2   PLATELETS 10*3/mm3 205 250     Results from last 7 days   Lab Units 04/02/20  0037 04/01/20  1732   SODIUM mmol/L 141 143   POTASSIUM mmol/L 3.8 4.0   CHLORIDE mmol/L 108* 107   CO2 mmol/L 22.0 24.7   BUN mg/dL 11 12   CREATININE mg/dL 0.82 0.86   CALCIUM mg/dL 8.9 9.2   GLUCOSE mg/dL 99 90   ALT (SGPT) U/L 14 15   AST (SGOT) U/L 16 18     Lab Results   Component Value Date    INR 0.96 03/06/2016    INR 0.90 09/20/2014    INR 0.96 09/17/2014     Lab Results   Component Value Date    MG 2.1 04/01/2020    MG 2.1 03/07/2016    MG 2.2 09/20/2014     Lab Results   Component Value Date    TSH 0.886 04/01/2020    TRIG 126 04/02/2020    HDL 56 04/02/2020     (H) 04/02/2020      Lab Results   Component Value Date    PROBNP 64.8 04/01/2020       ECG  ECG/EMG Results (last 24 hours)     Procedure Component Value Units Date/Time    ECG 12 Lead [[id number]] Collected:  04/01/20 1720     Updated:  04/01/20 1751         Imaging Results (Last 72 Hours)     Procedure Component Value Units Date/Time    US Carotid Bilateral [[id number]] Collected:  04/02/20 0826     Updated:  04/02/20 0829    Narrative:       EXAMINATION: US CAROTID BILATERAL-      Technique: Multiple real-time color Doppler images were acquired of  bilateral carotid arteries.     Stenosis measurements if obtained, were performed by the NASCET or  similar method.        CLINICAL INDICATION:     recurrent lightheadedness, syncope;  R94.31-Abnormal electrocardiogram (ECG) (EKG); R07.9-Chest pain,  unspecified; J18.1-Lobar pneumonia, unspecified organism 0      COMPARISON:    None     FINDINGS:         Right:        Mid internal carotid artery peak systolic velocity of -754.00 mm/s  end-diastolic velocity of -154.00 mm/s.   Right ICA/CCA Ratio:   1.50     Left:      Mid internal carotid artery peak systolic velocity of -787.00 mm/s and  end-diastolic velocity of -130.00 mm/s.   Left ICA/CCA Ratio:   1           Anterograde flow is demonstrated in bilateral vertebral arteries.       Impression:       Impression:  No hemodynamically significant stenosis.  Heterogeneous appearance of right lobe of thyroid. Multiple nodules.  Further evaluation with thyroid ultrasound recommended.  Comments:  <50% stenosis: PSV <125cm/s and Ratio of <2  50-69% stenosis: -229cm/s and Ratio of 2-3.9  70-99% stenosis: PSV >230cm/s and Ratio of >4     This report was finalized on 4/2/2020 8:27 AM by Dr. Awais Farris MD.       CT Head Without Contrast [[id number]] Collected:  04/01/20 2221     Updated:  04/01/20 2223    Narrative:       CT Head WO    HISTORY:   Recurrent syncope. Lightheadedness today.    TECHNIQUE:   Axial unenhanced head CT with multiplanar reformats. Radiation dose reduction techniques included automated exposure control or exposure modulation based on body size. Count of known CT and cardiac nuc med studies performed in previous 12 months: 1.     Time of scan: 2203 hours    COMPARISON:   None.    FINDINGS:   No intracranial hemorrhage, mass, or infarct. No hydrocephalus or extra-axial fluid collection. Brain parenchymal density is normal. The skull base, calvarium, and extracranial soft tissues are normal. Visualized paranasal sinuses and mastoid air cells  are clear.      Impression:       Normal, negative unenhanced head CT.          Signer Name: Michael Smith MD   Signed: 4/1/2020 10:21 PM   Workstation Name: [email or web address]    [facility]    CT Chest Pulmonary Embolism [[id number]] Collected:  04/01/20 1923     Updated:  04/01/20 1927    Narrative:       CT CHEST PULMONARY EMBOLISM-     CLINICAL INDICATION: Pulmonary embolism; R94.31-Abnormal  electrocardiogram (ECG) (EKG); R07.9-Chest pain, unspecified;  J18.1-Lobar pneumonia, unspecified organism         COMPARISON: Chest x-ray performed the same day      PROCEDURE: Thin cut axial images were acquired through the pulmonary  vessels during the rapid infusion of IV contrast.     Additional 3-D reformatted images obtained via post-processing for  improved diagnostic accuracy and procedural planning.     Radiation dose reduction techniques were utilized per ALARA protocol.  Automated exposure control was initiated through either or [employer] or  [employer] software packages by  protocol.           FINDINGS: Today's study demonstrates opacification of the central  pulmonary vessels.   There are no filling defects.   There is no truncation.     No evidence of a pulmonary embolus.     Otherwise there are no parenchymal soft tissue nodules or masses.     There is no mediastinal lymph node enlargement     No pericardial or pleural effusion.          Impression:       1. No evidence of a pulmonary embolus  2. The area in question in the right upper lobe on plain radiography is  not evident on the CT scan and may have represented superimposition of  adjacent structures..     This report was finalized on 4/1/2020 7:25 PM by Dr. Oleg Harris MD.       XR Chest 1 View [[id number]] Collected:  04/01/20 1815     Updated:  04/01/20 1817    Narrative:       XR CHEST 1 VW-     CLINICAL INDICATION: soa        COMPARISON: 09/19/2014      TECHNIQUE: Single frontal view of the chest.     FINDINGS:     Right upper lobe airspace disease  The cardiac silhouette is normal. The pulmonary vasculature is  unremarkable.  There is no evidence of an acute osseous abnormality.   There are no suspicious-appearing parenchymal soft tissue nodules.          Impression:       Right upper lobe consolidation, very possibly pneumonia     This report was finalized on 4/1/2020 6:15 PM by Dr. Oleg Harris MD.             I have discussed my impression and recommendations with the patient and family.    Thank you very much for asking us to be  involved in this patient's care.  We will follow along with you.      Rama Remington, ADRIAN  04/02/20  08:45  I, Anthony Faustin MD, [facility], performed the services described in this documentation as documented by the above-named individual under my supervision and made necessary changes in the note is both accurate and complete  Please note that portions of this note were completed with a voice recognition program.

## 2020-04-02 NOTE — PLAN OF CARE
Problem: Patient Care Overview  Goal: Plan of Care Review  Outcome: Ongoing (interventions implemented as appropriate)     Pt resting in bed watching television. Pt has no complaints at this time. Anticipated discharge some time today.

## 2020-04-02 NOTE — H&P
Hospitalist History and Physical        Patient Identification  Name: Lian Kang  Age/Sex: 59 y.o. female  :  1960        MRN: [id number]  Visit Number: [id number]  PCP: Arben Ibrahim MD        Chief complaint chest pain    History of Present Illness:  Patient is a 59 y.o. female with history of non-insulin dependent type II DM, HTN, HLD, bradycardia s/p pacemaker, AMANDEEP on CPAP, GERD, SVT s/p ablation, atrial fibrillation and mild systolic CHF per recent ECHO, who presents with complaints of chest pain. She reports an episode this morning which she describes as both sharp and pressure over the middle of her chest, radiating to her jaw, shoulders, arms and back, associated with dyspnea, nausea but no vomiting, and diaphoresis. The episode resolved with sublingual nitroglycerin. She had another episode later this afternoon which again resolved with nitroglycerin. She then saw her PCP in clinic later in the day who referred her to the ED due to her earlier symptoms. She has been chest pain free since arrival. She does report some dyspnea with exertion which has been increasing in recent weeks. She reports stable lower extremity edema. She reports frequent episodes of lightheadedness that occasionally resulted in syncopal spells 3-4 months ago. She recently had an event monitor placed by her cardiology about a month ago which showed intermittent episodes of tachycardia according to the patient. She has not lost consciousness in 3 months but continues to get lightheaded from time to time. She also complains of intermittent headaches. In the ED, she had two consecutive troponin levels that were normal. EKG however showed T wave inversion in inferolateral leads which was new compared to prior EKG as recent as 3/2/2020. Patient has been admitted to the observation unit for further evaluation.     Review of Systems  Review of Systems   Constitutional: Positive for diaphoresis (associated with chest pain).  Negative for activity change, chills, fatigue, fever and unexpected weight change.   HENT: Negative for congestion, postnasal drip, rhinorrhea, sinus pressure, sinus pain and sore throat.    Eyes: Negative for photophobia, pain, discharge, redness, itching and visual disturbance.   Respiratory: Positive for shortness of breath (associated with chest pain, and some with exertion). Negative for cough and wheezing.    Cardiovascular: Positive for chest pain and leg swelling (chronic, unchanged from baseline). Negative for palpitations.   Gastrointestinal: Positive for nausea (associated with chest pain). Negative for abdominal distention, abdominal pain, constipation, diarrhea and vomiting.   Endocrine: Negative for cold intolerance, heat intolerance, polydipsia, polyphagia and polyuria.   Genitourinary: Negative for difficulty urinating, dysuria, flank pain, frequency and hematuria.   Musculoskeletal: Positive for back pain (associated with chest pain). Negative for arthralgias, myalgias, neck pain and neck stiffness.   Skin: Negative for color change, pallor, rash and wound.   Neurological: Positive for dizziness, syncope (but not in last 3 months) and light-headedness. Negative for tremors, seizures, weakness and numbness.   Hematological: Negative for adenopathy. Does not bruise/bleed easily.   Psychiatric/Behavioral: Negative for agitation, behavioral problems and confusion.       History  Past Medical History:   Diagnosis Date   • Anxiety and depression    • Atypical chest pain    • Diabetes mellitus (CMS/HCC)    • Dyslipidemia    • GERD (gastroesophageal reflux disease)    • H/O valvular heart disease     Echocardiogram 2013 revealing EF 60%. Mild left atrial enlargement, trace AI, trace MR, mild TR.   • Hypertension    • Insomnia    • Migraine headache     Chronic migraine headaches/Topamax therapy.    • Moderate obesity    • AMANDEEP on CPAP    • Pituitary adenoma (CMS/HCC)     History of pituitary  adenoma/bromocriptine therapy.    • Sinus bradycardia     Severe symptomatic bradycardia secondary to sinus node dysfunction and sinus bradycardia with pacemaker at elective replacement indicator.   • SVT (supraventricular tachycardia) (CMS/HCC)     With prior ablation.     *  Atrial fibrillation (per patient account)    *  Mild systolic CHF (ECHO from 2/17/2020 shows EF 46-50%    Past Surgical History:   Procedure Laterality Date   • PACEMAKER IMPLANTATION  2010    Apparent reported symptomatic bradycardia on metoprolol therapy/status post dual chamber [place] permanent pacemaker implantation by Dr. Michael Mcknight in 2010.     Family History   Problem Relation Age of Onset   • Heart disease Mother    • Heart failure Mother    • Heart disease Father    • Heart failure Father      Social History     Tobacco Use   • Smoking status: Never Smoker   • Smokeless tobacco: Never Used   Substance Use Topics   • Alcohol use: No   • Drug use: No     Medications Prior to Admission   Medication Sig Dispense Refill Last Dose   • Apixaban (ELIQUIS STARTER PACK) tablet Take two 5 mg tablets by mouth every 12 hours for 7 days. Followed by one 5 mg tablet every 12 hours. (Dispense starter pack if available) 180 tablet 1 Taking   • bromocriptine (PARLODEL) 2.5 MG tablet Take 2.5 mg by mouth Daily.   Taking   • busPIRone (BUSPAR) 10 MG tablet Take 10 mg by mouth 3 (Three) Times a Day.   Taking   • carvedilol (COREG) 6.25 MG tablet Take 1 tablet by mouth 2 (Two) Times a Day. 180 tablet 3 Taking   • clonazePAM (KlonoPIN) 0.5 MG tablet Take 0.5 mg by mouth 2 (two) times a day as needed for seizures.   Taking   • docusate sodium (COLACE) 100 MG capsule Take 100 mg by mouth 2 (Two) Times a Day.   Taking   • fluticasone (FLONASE) 50 MCG/ACT nasal spray 2 sprays into the nostril(s) as directed by provider Daily.   Taking   • furosemide (LASIX) 20 MG tablet Take 20 mg by mouth daily.   Taking   • gabapentin (NEURONTIN) 300 MG capsule Take 300  mg by mouth 3 (Three) Times a Day.   Taking   • HYDROcodone-acetaminophen (NORCO) 5-325 MG per tablet Take 1 tablet by mouth Every 6 (Six) Hours As Needed.   Taking   • LEVALBUTEROL HCL IN Inhale.   Taking   • metFORMIN (GLUCOPHAGE) 500 MG tablet Take 500 mg by mouth Daily With Breakfast.   Taking   • methocarbamol (ROBAXIN) 500 MG tablet Take 500 mg by mouth 3 (Three) Times a Day.   Taking   • pantoprazole (PROTONIX) 40 MG EC tablet Take 40 mg by mouth Daily.   Taking   • pravastatin (PRAVACHOL) 40 MG tablet Take 80 mg by mouth Every Night.   Taking   • promethazine (PHENERGAN) 25 MG tablet Take 25 mg by mouth 2 (Two) Times a Day As Needed for Nausea or Vomiting.   Taking   • sertraline (ZOLOFT) 100 MG tablet Take 1 tablet by mouth Daily. 90 tablet 1    • tiZANidine (ZANAFLEX) 4 MG tablet Take 4 mg by mouth At Night As Needed for Muscle Spasms.   Taking   • Topiramate  MG capsule extended-release 24 hour sprinkle Take 100 mg by mouth 2 (Two) Times a Day.   Taking   • traZODone (DESYREL) 50 MG tablet Take 50 mg by mouth Every Night.   Taking   • Umeclidinium Bromide (INCRUSE ELLIPTA) 62.5 MCG/INH aerosol powder  Inhale.   Taking     Allergies:  Patient has no known allergies.    Objective     Vital Signs  Temp:  [98.8 °F (37.1 °C)-99 °F (37.2 °C)] 98.8 °F (37.1 °C)  Heart Rate:  [70-99] 99  Resp:  [18-20] 20  BP: (113-148)/(78-96) 148/92  Body mass index is 30.23 kg/m².    Physical Exam:  Physical Exam   Constitutional: She is oriented to person, place, and time. She appears well-developed and well-nourished. No distress.   HENT:   Head: Normocephalic and atraumatic.   Mouth/Throat: Oropharynx is clear and moist.   Eyes: Pupils are equal, round, and reactive to light. Conjunctivae and EOM are normal.   Neck: Normal range of motion. Neck supple. No JVD present.   Cardiovascular: Regular rhythm, normal heart sounds and intact distal pulses.   No murmur heard.  Borderline tachycardic   Pulmonary/Chest: Effort  normal and breath sounds normal. No respiratory distress. She has no wheezes. She has no rales. She exhibits no tenderness.   Abdominal: Soft. Bowel sounds are normal. She exhibits no distension. There is no tenderness.   Musculoskeletal: Normal range of motion. She exhibits no edema, tenderness or deformity.   Lymphadenopathy:     She has no cervical adenopathy.   Neurological: She is alert and oriented to person, place, and time.   No gross focal deficits appreciated   Skin: Skin is warm and dry. Capillary refill takes less than 2 seconds. No rash noted. She is not diaphoretic. No erythema. No pallor.   Psychiatric: She has a normal mood and affect. Her behavior is normal. Judgment and thought content normal.         Results Review:       Lab Results:  Results from last 7 days   Lab Units 04/01/20  1732   WBC 10*3/mm3 7.97   HEMOGLOBIN g/dL 13.2   PLATELETS 10*3/mm3 250     Results from last 7 days   Lab Units 04/01/20  1732   CRP mg/dL 0.08     Results from last 7 days   Lab Units 04/01/20  1732   SODIUM mmol/L 143   POTASSIUM mmol/L 4.0   CHLORIDE mmol/L 107   CO2 mmol/L 24.7   BUN mg/dL 12   CREATININE mg/dL 0.86   CALCIUM mg/dL 9.2   GLUCOSE mg/dL 90     Results from last 7 days   Lab Units 04/01/20  1732   MAGNESIUM mg/dL 2.1     Hemoglobin A1C   Date Value Ref Range Status   04/01/2020 5.80 (H) 4.80 - 5.60 % Final     Results from last 7 days   Lab Units 04/01/20  1732   BILIRUBIN mg/dL 0.2   ALK PHOS U/L 67   AST (SGOT) U/L 18   ALT (SGPT) U/L 15     Results from last 7 days   Lab Units 04/01/20  1931 04/01/20  1732   TROPONIN T ng/mL <0.010 <0.010                   I have reviewed the patient's laboratory results.    Imaging:  Imaging Results (Last 72 Hours)     Procedure Component Value Units Date/Time    CT Chest Pulmonary Embolism [[id number]] Collected:  04/01/20 1923     Updated:  04/01/20 1927    Narrative:       CT CHEST PULMONARY EMBOLISM-     CLINICAL INDICATION: Pulmonary embolism;  R94.31-Abnormal  electrocardiogram (ECG) (EKG); R07.9-Chest pain, unspecified;  J18.1-Lobar pneumonia, unspecified organism        COMPARISON: Chest x-ray performed the same day      PROCEDURE: Thin cut axial images were acquired through the pulmonary  vessels during the rapid infusion of IV contrast.     Additional 3-D reformatted images obtained via post-processing for  improved diagnostic accuracy and procedural planning.     Radiation dose reduction techniques were utilized per ALARA protocol.  Automated exposure control was initiated through either or [employer] or  [employer] software packages by  protocol.           FINDINGS: Today's study demonstrates opacification of the central  pulmonary vessels.   There are no filling defects.   There is no truncation.     No evidence of a pulmonary embolus.     Otherwise there are no parenchymal soft tissue nodules or masses.     There is no mediastinal lymph node enlargement     No pericardial or pleural effusion.          Impression:       1. No evidence of a pulmonary embolus  2. The area in question in the right upper lobe on plain radiography is  not evident on the CT scan and may have represented superimposition of  adjacent structures..     This report was finalized on 4/1/2020 7:25 PM by Dr. Oleg Harris MD.       XR Chest 1 View [[id number]] Collected:  04/01/20 1815     Updated:  04/01/20 1817    Narrative:       XR CHEST 1 VW-     CLINICAL INDICATION: soa        COMPARISON: 09/19/2014      TECHNIQUE: Single frontal view of the chest.     FINDINGS:     Right upper lobe airspace disease  The cardiac silhouette is normal. The pulmonary vasculature is  unremarkable.  There is no evidence of an acute osseous abnormality.   There are no suspicious-appearing parenchymal soft tissue nodules.          Impression:       Right upper lobe consolidation, very possibly pneumonia     This report was finalized on 4/1/2020 6:15 PM by Dr. Oleg Harris MD.              I have personally reviewed the patient's radiologic imaging.        EKG: Atrial paced rhythm, HR 70, QTc 386. T wave inversion in leads II, III, AVf, as well as leads V3-V6, with mild associated ST depression in these leads to varying degrees as well.     I have personally reviewed the patient's EKG.        Assessment/Plan     - Unstable angina (CMS/HCC) with EKG changes concerning for inferolateral ischemia, with multiple cardiac risk factors: ruling out for NSTEMI at this time with 2 negative troponin levels. Continue to trend. Received full dose ASA in ED. Continue baby ASA along with home plavix and statin. NPO after midnight. Obtain limited ECHO in the morning to compare to recent ECHO from February in regards to EF and any potential wall motion abnormalities. Consult cardiology for further assistance in workup.  - Recurrent lightheadedness with occasional syncopal episodes: recently had event monitor worn and interrogated within last month. No syncopal episodes in 3 months but continues to have dizzy/lightheaded episodes. Obtain CT head now and carotid doppler in the morning to evaluate further. Check orthostatic vitals q8h.   - Type II DM, non insulin dependent: only takes metformin. A1c is 5.8. Monitor accuchecks, hold off on insulin for now. Hold metformin for now as well.   - COPD: appears compensated at this time. Continue to monitor. Review home meds once reconciled by pharmacy.  - Mild systolic CHF: compensated currently. Review home meds once reconciled by pharmacy.   - History bradycardia s/p pacemaker: continue to monitor on telemetry.  - History SVT, atrial fibrillation: anticoaguled on eliquis, plan to resume once reconciled by pharmacy.  - Hypertension: review home meds once reconciled by pharmacy  - Hyperlipidemia: resume home statin. Check lipid panel in AM.    DVT Prophylaxis: anticoagulated on eliquis    Estimated Length of Stay >2 midnights    I discussed the patient's findings,  assessment and plan with the patient and her RN Linda who was present during my interview and exam in the observation unit.    * patient is high risk due to unstable angina with EKG changes, multiple cardiac risk factors, recurrent syncope/presyncope,     Garrett Tan MD  04/01/20  21:23

## 2020-04-02 NOTE — TELEPHONE ENCOUNTER
Marilu w/ [facility] LVM asking if we prescribed Eliquis for pt. Stated that it needs a PA and that they weren't sure if we were working on it too.   #[phone]      Per chart review, Eliquis is listed as historical, it was not prescribed at our office.       Called [phone], spoke w/ Marilu, informed her that it doesn't look like the Eliquis was prescribed at our office, but that it may have been prescribed by Dr. Faustin. She asked if we have given pt any Eliquis samples, I informed her that I didn't think we have. She stated she would contact pt.     Pt is also a pt of [facility], not [facility].

## 2020-04-03 VITALS
OXYGEN SATURATION: 99 % | HEART RATE: 84 BPM | SYSTOLIC BLOOD PRESSURE: 134 MMHG | DIASTOLIC BLOOD PRESSURE: 90 MMHG | RESPIRATION RATE: 16 BRPM | HEIGHT: 67 IN | BODY MASS INDEX: 31.11 KG/M2 | TEMPERATURE: 98.1 F | WEIGHT: 198.19 LBS

## 2020-04-03 LAB
GLUCOSE BLDC GLUCOMTR-MCNC: 107 MG/DL (ref 70–130)
GLUCOSE BLDC GLUCOMTR-MCNC: 135 MG/DL (ref 70–130)

## 2020-04-03 PROCEDURE — 94799 UNLISTED PULMONARY SVC/PX: CPT

## 2020-04-03 PROCEDURE — 93005 ELECTROCARDIOGRAM TRACING: CPT | Performed by: INTERNAL MEDICINE

## 2020-04-03 PROCEDURE — 93010 ELECTROCARDIOGRAM REPORT: CPT | Performed by: SPECIALIST

## 2020-04-03 PROCEDURE — 99214 OFFICE O/P EST MOD 30 MIN: CPT | Performed by: SPECIALIST

## 2020-04-03 PROCEDURE — 99217 PR OBSERVATION CARE DISCHARGE MANAGEMENT: CPT | Performed by: INTERNAL MEDICINE

## 2020-04-03 PROCEDURE — G0378 HOSPITAL OBSERVATION PER HR: HCPCS

## 2020-04-03 PROCEDURE — 82962 GLUCOSE BLOOD TEST: CPT

## 2020-04-03 RX ADMIN — PANTOPRAZOLE SODIUM 40 MG: 40 TABLET, DELAYED RELEASE ORAL at 08:43

## 2020-04-03 RX ADMIN — ISOSORBIDE MONONITRATE 30 MG: 30 TABLET, EXTENDED RELEASE ORAL at 08:43

## 2020-04-03 RX ADMIN — GABAPENTIN 300 MG: 300 CAPSULE ORAL at 08:43

## 2020-04-03 RX ADMIN — APIXABAN 5 MG: 5 TABLET, FILM COATED ORAL at 08:43

## 2020-04-03 RX ADMIN — TOPIRAMATE 150 MG: 100 TABLET, FILM COATED ORAL at 08:42

## 2020-04-03 RX ADMIN — SODIUM CHLORIDE, PRESERVATIVE FREE 10 ML: 5 INJECTION INTRAVENOUS at 08:43

## 2020-04-03 RX ADMIN — ASPIRIN 81 MG: 81 TABLET, COATED ORAL at 08:43

## 2020-04-03 RX ADMIN — SERTRALINE 100 MG: 50 TABLET, FILM COATED ORAL at 08:43

## 2020-04-03 RX ADMIN — ESCITALOPRAM 10 MG: 10 TABLET, FILM COATED ORAL at 08:43

## 2020-04-03 RX ADMIN — HYDROCODONE BITARTRATE AND ACETAMINOPHEN 1 TABLET: 5; 325 TABLET ORAL at 10:40

## 2020-04-03 RX ADMIN — FLUTICASONE PROPIONATE 2 SPRAY: 50 SPRAY, METERED NASAL at 08:43

## 2020-04-03 RX ADMIN — CETIRIZINE HYDROCHLORIDE 10 MG: 10 TABLET, FILM COATED ORAL at 08:42

## 2020-04-03 RX ADMIN — POTASSIUM CHLORIDE 10 MEQ: 750 TABLET, FILM COATED, EXTENDED RELEASE ORAL at 08:42

## 2020-04-03 RX ADMIN — MAGNESIUM GLUCONATE 500 MG ORAL TABLET 400 MG: 500 TABLET ORAL at 08:43

## 2020-04-03 RX ADMIN — POLYETHYLENE GLYCOL (3350) 17 G: 17 POWDER, FOR SOLUTION ORAL at 08:42

## 2020-04-03 RX ADMIN — FAMOTIDINE 20 MG: 20 TABLET ORAL at 08:42

## 2020-04-03 RX ADMIN — FUROSEMIDE 20 MG: 20 TABLET ORAL at 08:43

## 2020-04-03 NOTE — DISCHARGE INSTR - APPOINTMENTS
Follow up with Dr. Faustin phone/video visit. April 16, 2020 @ 10:45pm  Follow up with Dr. Ibrahim. April 8, 2020 @ 1:00pm

## 2020-04-03 NOTE — DISCHARGE SUMMARY
[facility] HOSPITALIST MEDICINE DISCHARGE SUMMARY    Patient Identification:  Name:  Lian Kang  Age:  59 y.o.  Sex:  female  :  1960  MRN:  [id number]  Visit Number:  [id number]    Date of Admission: 2020  Date of Discharge:  4/3/2020     PCP: Arben Ibrahim MD    DISCHARGE DIAGNOSIS   1.  Chest pain  2.  Paroxysmal A. Fib  3.  Essential hypertension  4.  History of sick sinus syndrome  5.  Hyperlipidemia  6.  Non-insulin-dependent type 2 diabetes mellitus      CONSULTS  1.  Dr. Faustin, Cardiology      PROCEDURES PERFORMED   None      HOSPITAL COURSE  Ms. Kang is a 59 y.o. female who presented to [facility] ED on 2020 with a chief complaint of chest pain.  Patient has a past medical history remarkable for non-insulin-dependent type 2 diabetes mellitus, essential hypertension, hyperlipidemia, sick sinus syndrome, GERD, SVT status post ablation, A. fib, mild systolic CHF.  Patient states that she began having chest pain on the morning of her presentation to the emergency department.  She described the pain as sharp in nature with pressure over the middle of her chest radiating into her jaw and shoulders as well as her back.  She had associated shortness of breath and nausea but did not vomit.  She reports her symptoms improved with sublingual nitroglycerin.  For this reason, she presented to her primary care provider later in the day who then referred her to the emergency department for further treatment and evaluation.  Initial evaluation emergency department did consist of basic laboratory work as well as physical exam and vital signs.  An EKG was also obtained that demonstrated some changes concerning for inferior lateral ischemia.  For this reason, patient was admitted for chest pain rule out.    Cardiology services were consulted who did thoroughly evaluate the patient.  After thorough evaluation, recommendation was made to obtain a nuclear stress test as well  as transthoracic echocardiogram.  Patient did undergo nuclear stress test on 4/2/2020 which demonstrated a normal myocardial perfusion study with no evidence of ischemia.  Transthoracic echocardiogram demonstrated normal left ventricular systolic function with estimated EF 61 to 65%.  With this in mind, it was felt no further ischemic cardiac work-up was warranted.  However, patient also complained of palpitations.  Attempt was made to transition patient from carvedilol to sotalol for rhythm control.  Unfortunately, the addition of sotalol did prolong patient's QT interval.  As such, patient did have sotalol discontinued and she was placed back on her home dose of carvedilol.  With this in mind, it is felt patient has reached maximum medical benefit of current hospitalization and she will be discharged home in stable condition today.  She will have a follow-up appointment with Dr. Faustin in 2 weeks.  The beforementioned plan was thoroughly discussed with the patient and she expressed understanding and willingness to proceed with the beforementioned plan.    VITAL SIGNS:      04/01/20  1728 04/02/20  0605   Weight: 87.5 kg (193 lb) 89.9 kg (198 lb 3.1 oz)     Body mass index is 31.03 kg/m².    PHYSICAL EXAM:  Constitutional: Well-nourished  female in no apparent distress.     HENT:  Head:  Normocephalic and atraumatic.  Mouth:  Moist mucous membranes.    Eyes:  Conjunctivae and EOM are normal.  Pupils are equal, round, and reactive to light.  No scleral icterus.    Neck:  Neck supple. No thyromegaly.  No JVD present.    Cardiovascular:  Regular rate and rhythm with no murmurs, rubs, clicks or gallops appreciated.  Pulmonary/Chest:  Clear to auscultation bilaterally with no crackles, wheezes or rhonchi appreciated.  Abdominal:  Soft. Nontender. Nondistended  Bowel sounds are normal in all four quadrants. No organomegally appreciated.   Musculoskeletal:  5/5 muscle strength bilateral upper and lower extermties  with equal muscle tone and bulk. No edema, no tenderness, and no deformity.  No red or swollen joints anywhere.    Neurological:  Alert and oriented to person, place, and time. Cranial nerves II-XII intact with no focal defecits.  No facial droop.  No slurred speech.   Skin:  Warm and dry to palpation with no rashes or lesions appreciated.  Psychiatric:  Alert and oriented x3, demonstrates appropriate judgement and insight.    DISCHARGE DISPOSITION   Stable    DISCHARGE MEDICATIONS:     Discharge Medications      Continue These Medications      Instructions Start Date   albuterol sulfate  (90 Base) MCG/ACT inhaler  Commonly known as:  PROVENTIL HFA;VENTOLIN HFA;PROAIR HFA   2 puffs, Inhalation, 4 Times Daily PRN      apixaban 5 MG tablet tablet  Commonly known as:  ELIQUIS   5 mg, Oral, 2 Times Daily      bromocriptine 2.5 MG tablet  Commonly known as:  PARLODEL   2.5 mg, Oral, Daily      busPIRone 10 MG tablet  Commonly known as:  BUSPAR   10 mg, Oral, 3 Times Daily PRN      carvedilol 6.25 MG tablet  Commonly known as:  COREG   6.25 mg, Oral, 2 Times Daily      cetirizine 10 MG tablet  Commonly known as:  zyrTEC   10 mg, Oral, Daily      clonazePAM 0.5 MG tablet  Commonly known as:  KlonoPIN   0.25 mg, Oral, 3 Times Daily PRN      docusate sodium 100 MG capsule  Commonly known as:  COLACE   100 mg, Oral, 2 Times Daily PRN      escitalopram 10 MG tablet  Commonly known as:  LEXAPRO   10 mg, Oral, Daily      fluticasone 50 MCG/ACT nasal spray  Commonly known as:  FLONASE   2 sprays, Nasal, Daily      furosemide 20 MG tablet  Commonly known as:  LASIX   20 mg, Oral, Daily      gabapentin 300 MG capsule  Commonly known as:  NEURONTIN   300 mg, Oral, 3 Times Daily      HYDROcodone-acetaminophen 5-325 MG per tablet  Commonly known as:  NORCO   1 tablet, Oral, Every 6 Hours PRN      Incruse Ellipta 62.5 MCG/INH aerosol powder   Generic drug:  Umeclidinium Bromide   1 puff, Inhalation, Daily      isosorbide  mononitrate 30 MG 24 hr tablet  Commonly known as:  IMDUR   30 mg, Oral, Daily      levalbuterol 45 MCG/ACT inhaler  Commonly known as:  XOPENEX HFA   1-2 puffs, Inhalation, Every 4 Hours PRN      magnesium oxide 400 (241.3 Mg) MG tablet tablet  Commonly known as:  MAGOX   400 mg, Oral, 2 Times Daily      metFORMIN 500 MG tablet  Commonly known as:  GLUCOPHAGE   500 mg, Oral, Daily With Breakfast      methocarbamol 500 MG tablet  Commonly known as:  ROBAXIN   500 mg, Oral, 3 Times Daily PRN      nitroglycerin 0.4 MG SL tablet  Commonly known as:  NITROSTAT   0.4 mg, Sublingual, Every 5 Minutes PRN, Take no more than 3 doses in 15 minutes.       pantoprazole 40 MG EC tablet  Commonly known as:  PROTONIX   40 mg, Oral, Daily      polyethylene glycol packet  Commonly known as:  MIRALAX   17 g, Oral, Daily      potassium chloride 10 MEQ CR tablet  Commonly known as:  K-DUR,KLOR-CON   10 mEq, Oral, 2 Times Daily      pravastatin 80 MG tablet  Commonly known as:  PRAVACHOL   80 mg, Oral, Nightly      promethazine 25 MG tablet  Commonly known as:  PHENERGAN   25 mg, Oral, 3 Times Daily PRN      raNITIdine 150 MG tablet  Commonly known as:  ZANTAC   150 mg, Oral, Every Evening      sertraline 100 MG tablet  Commonly known as:  ZOLOFT   100 mg, Oral, Daily      tiZANidine 4 MG tablet  Commonly known as:  ZANAFLEX   4 mg, Oral, Every 8 Hours PRN      topiramate 100 MG tablet  Commonly known as:  TOPAMAX   150 mg, Oral, Daily      traZODone 50 MG tablet  Commonly known as:  DESYREL   50 mg, Oral, Nightly             Diet Instructions     CARDIAC/DIABETIC             Your Scheduled Appointments    Apr 16, 2020 12:10 AM EDT  PACEMAKER - REMOTE with PACEART REMOTE, TALON [facility] BHLEX  [facility] CARDIOLOGY (--) [facility] [address] [address]  [phone]      Apr 16, 2020 10:45 AM EDT  Telephone Visit with Anthony Faustin MD  [facility] CARDIOLOGY (--) [address]  [place] [address] [phone]   A member of our staff will call you at your preferred phone number 15 minutes before your scheduled visit time to complete the check-in process.      Apr 20, 2020  3:00 PM EDT  Initial Evaluation with ADRIAN Lawson  [facility] BEHAVIORAL HEALTH (--) [address]  [place] [address]  [phone]   Bring all previous medical records and films, along with current medications and insurance information.     May 04, 2020  1:45 PM EDT  Follow Up with Anthony Faustin MD  [facility] CARDIOLOGY (--) [address] [place] [address] [phone]   Arrive 15 minutes prior to appointment.     Jun 19, 2020 11:30 AM EDT  Follow Up with Gurdeep Borja MD  [facility] CARDIOLOGY (--) [address]  [facility] [address] [phone]   Arrive 15 minutes prior to appointment.      Additional instructions:      Follow up with Dr. Faustin phone/video visit. April 16, 2020 @ 10:45pm  Follow up with Dr. Ibrahim. April 8, 2020 @ 1:00pm                Activity Instructions     AS TOLERATED               Additional Instructions for the Follow-ups that You Need to Schedule     Discharge Follow-up with Specified Provider: please follow up with Dr. Faustin in 2 weeks; 2 Weeks   As directed      To:  please follow up with Dr. Faustin in 2 weeks    Follow Up:  2 Weeks           Follow-up Information     Arben Ibrahim MD .    Specialty:  Family Medicine  Contact information:  [address]  [place] [address]  [facility] [address]  [phone]                   TEST  RESULTS PENDING AT DISCHARGE   Order Current Status    Blood Culture - Blood, Arm, Right Preliminary result    Blood Culture - Blood, Hand, Right Preliminary result           Santhosh Paniagua,   04/03/20  12:35    Please note that this discharge summary required more than 30 minutes to complete.    Please send a copy of this dictation to the following providers:   Arben Ibrahim MD

## 2020-04-03 NOTE — PLAN OF CARE
Problem: Patient Care Overview  Goal: Plan of Care Review  Outcome: Ongoing (interventions implemented as appropriate)  Flowsheets (Taken 4/3/2020 0422)  Outcome Summary: Pt has had complaint of headache at beginning of shift that was resolved with administration of pain medication. Pt has rested well the rest of the night with no complaints. Will continue to monitor.

## 2020-04-03 NOTE — PROGRESS NOTES
LOS: 0 days     Name: Lian Kang  Age/Sex: 59 y.o. female  :  1960        PCP: Arben Ibrahim MD  REF: No ref. provider found    Active Problems:    Unstable angina (CMS/[facility])      Reason for follow-up: Chest pain and Atrial fibrillation     Subjective       Subjective      Lian Kang is a 59 year old female admitted for chest pain. Past medical history significant for paroxysmal atrial fibrillation, diabetes mellitus, hypertension, dyslipidemia, sleep apnea and sick sinus syndrome S/P permanent pacemaker implantation. She presented to the ED secondary to chest pain. She states it began yesterday morning while she was sitting around. She described it as a dull pain in the middle of her chest that radiated to her upper back. She had associated shortness of breath but denied diaphoresis. She denies any aggravating factors.  She took 2 sublingual nitroglycerin and the pain was relieved. She rated the chest pain as a 10/10 on a pain scale. She has chronic lower back pain and reports that the day before her chest pain started her back pain worsened. She does have a history of atrial fibrillation and reports intermittent palpitations. She takes eliquis at home and denies any bleeding problems.  She does report worsening shortness of breath with minimal exertion that has been going on for about 2-3 months. She is not a current smoker but does live with her  who smokes 2 packs a day for the last 32 years in her home.  She denies any history of coronary artery disease. Per chart review she had a left heart catheterization in  that shows normal coronary arteries. Her last stress test per her report was 2 years ago. She is not a smoker. She follows with Dr. Faustin for cardiology and Dr. Borja for EP.     Interval History: Patient is resting in bed. She denies any chest pain today. Stress test was negative for ischemia. QTc prolonged today at 502 ms. She is asking to go home today.              Vital Signs  Temp:  [98 °F (36.7 °C)-98.3 °F (36.8 °C)] 98.1 °F (36.7 °C)  Heart Rate:  [] 115  Resp:  [16-18] 16  BP: (115-147)/() 132/89     Vital Signs (last 72 hrs)       03/31 0700  -  04/01 0659 04/01 0700  -  04/02 0659 04/02 0700  -  04/03 0659 04/03 0700  -  04/03 0838   Most Recent    Temp (°F)   97.5 -  99    98 -  98.3      98.1     98.1 (36.7)    Heart Rate   70 -  99    68 -  119    72 -  118     115    Resp   18 -  20    16 -  18      16     16    BP   105/65 -  148/92    115/75 -  147/105    132/89 -  138/101     132/89    SpO2 (%)   93 -  100    94 -  98      98     98        Body mass index is 31.03 kg/m².    Intake/Output Summary (Last 24 hours) at 4/3/2020 0838  Last data filed at 4/3/2020 0400  Gross per 24 hour   Intake 1310 ml   Output --   Net 1310 ml     Objective    Objective       Physical Exam:     General Appearance:    Alert, cooperative, in no acute distress   Head:    Normocephalic, without obvious abnormality, atraumatic   Eyes:            Conjunctivae and sclerae normal, no   icterus, no pallor, corneas clear.   Neck:   No adenopathy, supple, trachea midline, no thyromegaly, no   carotid bruit, no JVD   Lungs:     Clear to auscultation,respirations regular, even and                  unlabored    Heart:    Paced rhythm and normal rate, normal S1 and S2, no            murmur, no gallop, no rub, no click   Chest Wall:    No abnormalities observed   Abdomen:     Normal bowel sounds, no masses, no organomegaly, soft        non-tender, non-distended, no guarding, no rebound                tenderness   Extremities:   Moves all extremities well, no edema, no cyanosis, no             redness   Pulses:   Pulses palpable and equal bilaterally   Skin:   No bleeding, bruising or rash       Neurologic:   Alert and oriented    I have seen and performed a physical examination today.     Results review       Results Review:   Results from last 7 days   Lab Units 04/02/20  0037  04/01/20  1732   WBC 10*3/mm3 7.33 7.97   HEMOGLOBIN g/dL 12.5 13.2   PLATELETS 10*3/mm3 205 250     Results from last 7 days   Lab Units 04/02/20  0037 04/01/20  1732   SODIUM mmol/L 141 143   POTASSIUM mmol/L 3.8 4.0   CHLORIDE mmol/L 108* 107   CO2 mmol/L 22.0 24.7   BUN mg/dL 11 12   CREATININE mg/dL 0.82 0.86   CALCIUM mg/dL 8.9 9.2   GLUCOSE mg/dL 99 90   ALT (SGPT) U/L 14 15   AST (SGOT) U/L 16 18     Results from last 7 days   Lab Units 04/02/20  0737 04/02/20  0037 04/01/20  1931 04/01/20  1732   TROPONIN T ng/mL <0.010 <0.010 <0.010 <0.010     Lab Results   Component Value Date    INR 0.96 03/06/2016    INR 0.90 09/20/2014    INR 0.96 09/17/2014     Lab Results   Component Value Date    MG 2.1 04/01/2020    MG 2.1 03/07/2016    MG 2.2 09/20/2014     Lab Results   Component Value Date    TSH 0.886 04/01/2020    TRIG 126 04/02/2020    HDL 56 04/02/2020     (H) 04/02/2020      Imaging Results (Last 48 Hours)     Procedure Component Value Units Date/Time    US Carotid Bilateral [[id number]] Collected:  04/02/20 0826     Updated:  04/02/20 0829    Narrative:       EXAMINATION: US CAROTID BILATERAL-      Technique: Multiple real-time color Doppler images were acquired of  bilateral carotid arteries.     Stenosis measurements if obtained, were performed by the NASCET or  similar method.        CLINICAL INDICATION:     recurrent lightheadedness, syncope;  R94.31-Abnormal electrocardiogram (ECG) (EKG); R07.9-Chest pain,  unspecified; J18.1-Lobar pneumonia, unspecified organism 0      COMPARISON:    None     FINDINGS:         Right:        Mid internal carotid artery peak systolic velocity of -754.00 mm/s  end-diastolic velocity of -154.00 mm/s.   Right ICA/CCA Ratio:   1.50     Left:     Mid internal carotid artery peak systolic velocity of -787.00 mm/s and  end-diastolic velocity of -130.00 mm/s.   Left ICA/CCA Ratio:   1           Anterograde flow is demonstrated in bilateral vertebral arteries.        Impression:       Impression:  No hemodynamically significant stenosis.  Heterogeneous appearance of right lobe of thyroid. Multiple nodules.  Further evaluation with thyroid ultrasound recommended.  Comments:  <50% stenosis: PSV <125cm/s and Ratio of <2  50-69% stenosis: -229cm/s and Ratio of 2-3.9  70-99% stenosis: PSV >230cm/s and Ratio of >4     This report was finalized on 4/2/2020 8:27 AM by Dr. Awais Farris MD.       CT Head Without Contrast [[id number]] Collected:  04/01/20 2221     Updated:  04/01/20 2223    Narrative:       CT Head WO    HISTORY:   Recurrent syncope. Lightheadedness today.    TECHNIQUE:   Axial unenhanced head CT with multiplanar reformats. Radiation dose reduction techniques included automated exposure control or exposure modulation based on body size. Count of known CT and cardiac nuc med studies performed in previous 12 months: 1.     Time of scan: 2203 hours    COMPARISON:   None.    FINDINGS:   No intracranial hemorrhage, mass, or infarct. No hydrocephalus or extra-axial fluid collection. Brain parenchymal density is normal. The skull base, calvarium, and extracranial soft tissues are normal. Visualized paranasal sinuses and mastoid air cells  are clear.      Impression:       Normal, negative unenhanced head CT.          Signer Name: Michael Smith MD   Signed: 4/1/2020 10:21 PM   Workstation Name: [facility]    [facility]    CT Chest Pulmonary Embolism [[id number]] Collected:  04/01/20 1923     Updated:  04/01/20 1927    Narrative:       CT CHEST PULMONARY EMBOLISM-     CLINICAL INDICATION: Pulmonary embolism; R94.31-Abnormal  electrocardiogram (ECG) (EKG); R07.9-Chest pain, unspecified;  J18.1-Lobar pneumonia, unspecified organism        COMPARISON: Chest x-ray performed the same day      PROCEDURE: Thin cut axial images were acquired through the pulmonary  vessels during the rapid infusion of IV contrast.     Additional 3-D reformatted images  obtained via post-processing for  improved diagnostic accuracy and procedural planning.     Radiation dose reduction techniques were utilized per ALARA protocol.  Automated exposure control was initiated through either or [employer] or  [employer] software packages by  protocol.           FINDINGS: Today's study demonstrates opacification of the central  pulmonary vessels.   There are no filling defects.   There is no truncation.     No evidence of a pulmonary embolus.     Otherwise there are no parenchymal soft tissue nodules or masses.     There is no mediastinal lymph node enlargement     No pericardial or pleural effusion.          Impression:       1. No evidence of a pulmonary embolus  2. The area in question in the right upper lobe on plain radiography is  not evident on the CT scan and may have represented superimposition of  adjacent structures..     This report was finalized on 4/1/2020 7:25 PM by Dr. Oleg Harris MD.       XR Chest 1 View [[id number]] Collected:  04/01/20 1815     Updated:  04/01/20 1817    Narrative:       XR CHEST 1 VW-     CLINICAL INDICATION: soa        COMPARISON: 09/19/2014      TECHNIQUE: Single frontal view of the chest.     FINDINGS:     Right upper lobe airspace disease  The cardiac silhouette is normal. The pulmonary vasculature is  unremarkable.  There is no evidence of an acute osseous abnormality.   There are no suspicious-appearing parenchymal soft tissue nodules.          Impression:       Right upper lobe consolidation, very possibly pneumonia     This report was finalized on 4/1/2020 6:15 PM by Dr. Oleg Harris MD.           Lab Results   Component Value Date    BNP 59 09/19/2014     ECG      Echo   Results for orders placed during the hospital encounter of 04/01/20   Adult Transthoracic Echo Limited W/ Cont if Necessary Per Protocol    Addendum · This was a limited to assess LV wall motion and function. · Normal left ventricular cavity size and wall thickness  noted. All left  ventricular wall segments contract normally. · Estimated EF appears to be in the range of 61 - 65%. · The aortic valve appears trileaflet. The aortic valve is abnormal in  structure. There is mild-to-moderate thickening of the noncoronary cusp,  left coronary cusp and right coronary cusp of the aortic valve. No aortic  valve stenosis is present. · The mitral valve is normal in structure. No significant mitral valve  stenosis is present. · There is no evidence of pericardial effusion. · Comments: LV systolic function seems to have improved since the last  study.        Venkat Ontiveros MD 4/2/2020  9:04 AM          Narrative · Normal left ventricular cavity size and wall thickness noted. All left   ventricular wall segments contract normally.  · Estimated EF appears to be in the range of 61 - 65%.  · The aortic valve appears trileaflet. The aortic valve is abnormal in   structure. There is mild-to-moderate thickening of the noncoronary cusp,   left coronary cusp and right coronary cusp of the aortic valve. No aortic   valve stenosis is present.  · The mitral valve is normal in structure. No significant mitral valve   stenosis is present.  · There is no evidence of pericardial effusion.          Nuclear Stress Test  · A pharmacological stress test was performed using regadenoson.  · Findings consistent with an indeterminate ECG stress test.  · Myocardial perfusion imaging indicates a normal myocardial perfusion study with no evidence of ischemia.  · Normal LV cavity size. Normal LV wall motion noted.  · Left ventricular ejection fraction is normal (Calculated EF = 61%).  · Impressions are consistent with a low risk study.     I reviewed the patient's new clinical results.    Telemetry: Paced 70-80 bpm        Medication Review:     apixaban 5 mg Oral Q12H   aspirin 81 mg Oral Daily   bromocriptine 2.5 mg Oral Daily   cetirizine 10 mg Oral Daily   escitalopram 10 mg Oral Daily   famotidine 20 mg Oral  Daily   fluticasone 2 spray Nasal Daily   furosemide 20 mg Oral Daily   gabapentin 300 mg Oral Q12H   ipratropium 0.5 mg Nebulization 4x Daily - RT   isosorbide mononitrate 30 mg Oral Daily   magnesium oxide 400 mg Oral BID   pantoprazole 40 mg Oral QAM   polyethylene glycol 17 g Oral Daily   potassium chloride 10 mEq Oral BID With Meals   pravastatin 80 mg Oral Nightly   sertraline 100 mg Oral Daily   sodium chloride 10 mL Intravenous Q12H   sotalol 80 mg Oral Q12H   topiramate 150 mg Oral Daily            Assessment      Assessment:  1. Chest pain, stress test yesterday shows no evidence of ischemia   2. Paroxysmal atrial fibrillation, S/P catheter ablation in 2014, on eliquis, follows with Dr. Borja in [place], on sotalol, QTc prolonged today at 502 ms    3. Essential hypertension, controlled   4. Sick sinus syndrome, S/P PPM implantation with [place] device in 2010   5. Dyslipidemia, on statin therapy   6. Diabetes mellitus type 2   7. History of SVT  8. Sleep Apnea  9. Secondhand smoke exposure   Plan     Recommendations:  1. No further chest pain patient is feeling comfortable we will continue current management and she can probably discharged home  2. Her QTC has been prolonged with the sotalol we will discontinue sotalol and restart her metoprolol the usual home dose  3. Continue with the statin therapy  4. Patient can be followed up as an outpatient  5. Can be discharged home if okay with medicine service    I discussed the patients findings and my recommendations with patient and family      Rama TELLO MacedoOSCAR  04/03/20  08:38  I, Anthony Faustin MD, [facility], performed the services described in this documentation as documented by the above-named individual under my supervision and made necessary changes in the note is both accurate and complete  Please note that portions of this note were completed with a voice recognition program.   Yes

## 2020-04-03 NOTE — PROGRESS NOTES
Pharmacy Medication Assistance:    Pharmacy looked into the price of patient's home prescription of Eliquis. Patient states that she does not have any problems affording her medications and that he co-pay is $0. No further assistance needed at this time.     Thank you   Jason Key RPH

## 2020-04-03 NOTE — PLAN OF CARE
Problem: Patient Care Overview  Goal: Plan of Care Review  Outcome: Ongoing (interventions implemented as appropriate)   Pt resting up in chair talking to family member on phone. Pt did have a HA earlier in shift but has since resolved with PRN medication. Pt states she is ready to go home. Anticipated discharge possibly today.

## 2020-04-06 ENCOUNTER — TELEPHONE (OUTPATIENT)
Dept: CARDIOLOGY | Facility: CLINIC | Age: 60
End: 2020-04-06

## 2020-04-06 LAB
BACTERIA SPEC AEROBE CULT: NORMAL
BACTERIA SPEC AEROBE CULT: NORMAL

## 2020-04-16 ENCOUNTER — CLINICAL SUPPORT NO REQUIREMENTS (OUTPATIENT)
Dept: CARDIOLOGY | Facility: CLINIC | Age: 60
End: 2020-04-16

## 2020-04-16 ENCOUNTER — TELEPHONE (OUTPATIENT)
Dept: CARDIOLOGY | Facility: CLINIC | Age: 60
End: 2020-04-16

## 2020-04-16 ENCOUNTER — OFFICE VISIT (OUTPATIENT)
Dept: CARDIOLOGY | Facility: CLINIC | Age: 60
End: 2020-04-16

## 2020-04-16 VITALS
HEIGHT: 67 IN | WEIGHT: 185 LBS | BODY MASS INDEX: 29.03 KG/M2 | HEART RATE: 112 BPM | DIASTOLIC BLOOD PRESSURE: 100 MMHG | SYSTOLIC BLOOD PRESSURE: 139 MMHG

## 2020-04-16 DIAGNOSIS — Z99.89 OSA ON CPAP: Chronic | ICD-10-CM

## 2020-04-16 DIAGNOSIS — G47.33 OSA ON CPAP: Chronic | ICD-10-CM

## 2020-04-16 DIAGNOSIS — R07.2 PRECORDIAL CHEST PAIN: Primary | ICD-10-CM

## 2020-04-16 DIAGNOSIS — E78.2 MIXED HYPERLIPIDEMIA: ICD-10-CM

## 2020-04-16 DIAGNOSIS — I10 ESSENTIAL HYPERTENSION: ICD-10-CM

## 2020-04-16 DIAGNOSIS — Z95.0 PRESENCE OF CARDIAC PACEMAKER: ICD-10-CM

## 2020-04-16 DIAGNOSIS — R06.02 SHORTNESS OF BREATH: ICD-10-CM

## 2020-04-16 DIAGNOSIS — I48.0 PAROXYSMAL ATRIAL FIBRILLATION (HCC): ICD-10-CM

## 2020-04-16 DIAGNOSIS — R00.1 BRADYCARDIA, SINUS: Primary | ICD-10-CM

## 2020-04-16 PROCEDURE — 93296 REM INTERROG EVL PM/IDS: CPT | Performed by: INTERNAL MEDICINE

## 2020-04-16 PROCEDURE — 93294 REM INTERROG EVL PM/LDLS PM: CPT | Performed by: INTERNAL MEDICINE

## 2020-04-16 PROCEDURE — 99213 OFFICE O/P EST LOW 20 MIN: CPT | Performed by: SPECIALIST

## 2020-04-16 RX ORDER — CARVEDILOL 12.5 MG/1
12.5 TABLET ORAL 2 TIMES DAILY
Qty: 180 TABLET | Refills: 1 | Status: SHIPPED | OUTPATIENT
Start: 2020-04-16 | End: 2020-05-28

## 2020-04-16 RX ORDER — CARVEDILOL 6.25 MG/1
12.5 TABLET ORAL 2 TIMES DAILY
Qty: 180 TABLET | Refills: 1 | Status: SHIPPED | OUTPATIENT
Start: 2020-04-16 | End: 2020-04-16 | Stop reason: SDUPTHER

## 2020-04-16 NOTE — PROGRESS NOTES
You have chosen to receive care through a telephone visit. Do you consent to use a telephone visit for your medical care today? Yes      Subjective   Follow-up, chest pain  Lian Kang is a 59 y.o. female who presents to day for Follow-up; Shortness of Breath; Edema (Feet); and Dizziness.    CHIEF COMPLIANT  Chief Complaint   Patient presents with   • Follow-up   • Shortness of Breath   • Edema     Feet   • Dizziness       Active Problems:  Problem List Items Addressed This Visit        Cardiovascular and Mediastinum    Presence of cardiac pacemaker    Essential hypertension    Mixed hyperlipidemia    Paroxysmal atrial fibrillation (CMS/HCC)       Respiratory    AMANDEEP on CPAP (Chronic)       Nervous and Auditory    Precordial chest pain - Primary          HPI  HPI  Has been doing well, no further chest pain, no palpitations, no shortness of breath, she mild edema otherwise she has been doing fine with only thing her blood pressure has been going up a little bit on the last couple of days with some headache she is using CPAP every night she denied any palpitations or other history  PRIOR MEDS  Current Outpatient Medications on File Prior to Visit   Medication Sig Dispense Refill   • albuterol sulfate  (90 Base) MCG/ACT inhaler Inhale 2 puffs 4 (Four) Times a Day As Needed for Wheezing.     • apixaban (ELIQUIS) 5 MG tablet tablet Take 1 tablet by mouth Every 12 (Twelve) Hours. 28 tablet 0   • bromocriptine (PARLODEL) 2.5 MG tablet Take 2.5 mg by mouth Daily.     • busPIRone (BUSPAR) 10 MG tablet Take 10 mg by mouth 3 (Three) Times a Day As Needed (anxiety).     • carvedilol (COREG) 6.25 MG tablet Take 1 tablet by mouth 2 (Two) Times a Day. 180 tablet 3   • cetirizine (zyrTEC) 10 MG tablet Take 10 mg by mouth Daily.     • clonazePAM (KlonoPIN) 0.5 MG tablet Take 0.25 mg by mouth 3 (Three) Times a Day As Needed for Anxiety.     • docusate sodium (COLACE) 100 MG capsule Take 100 mg by mouth 2 (Two) Times a Day As  Needed for Constipation.     • escitalopram (LEXAPRO) 10 MG tablet Take 10 mg by mouth Daily.     • fluticasone (FLONASE) 50 MCG/ACT nasal spray 2 sprays into the nostril(s) as directed by provider Daily.     • furosemide (LASIX) 20 MG tablet Take 20 mg by mouth daily.     • gabapentin (NEURONTIN) 300 MG capsule Take 300 mg by mouth 3 (Three) Times a Day.     • HYDROcodone-acetaminophen (NORCO) 5-325 MG per tablet Take 1 tablet by mouth Every 6 (Six) Hours As Needed for Moderate Pain .     • isosorbide mononitrate (IMDUR) 30 MG 24 hr tablet Take 30 mg by mouth Daily.     • levalbuterol (XOPENEX HFA) 45 MCG/ACT inhaler Inhale 1-2 puffs Every 4 (Four) Hours As Needed for Wheezing.     • magnesium oxide (MAGOX) 400 (241.3 Mg) MG tablet tablet Take 400 mg by mouth 2 (Two) Times a Day.     • metFORMIN (GLUCOPHAGE) 500 MG tablet Take 500 mg by mouth Daily With Breakfast.     • methocarbamol (ROBAXIN) 500 MG tablet Take 500 mg by mouth 3 (Three) Times a Day As Needed for Muscle Spasms.     • nitroglycerin (NITROSTAT) 0.4 MG SL tablet Place 0.4 mg under the tongue Every 5 (Five) Minutes As Needed for Chest Pain. Take no more than 3 doses in 15 minutes.     • pantoprazole (PROTONIX) 40 MG EC tablet Take 40 mg by mouth Daily.     • polyethylene glycol (MIRALAX) packet Take 17 g by mouth Daily.     • potassium chloride (K-DUR,KLOR-CON) 10 MEQ CR tablet Take 10 mEq by mouth 2 (Two) Times a Day.     • pravastatin (PRAVACHOL) 80 MG tablet Take 80 mg by mouth Every Night.     • promethazine (PHENERGAN) 25 MG tablet Take 25 mg by mouth 3 (Three) Times a Day As Needed for Nausea or Vomiting.     • raNITIdine (ZANTAC) 150 MG tablet Take 150 mg by mouth Every Evening.     • sertraline (ZOLOFT) 100 MG tablet Take 1 tablet by mouth Daily. 90 tablet 1   • tiZANidine (ZANAFLEX) 4 MG tablet Take 4 mg by mouth Every 8 (Eight) Hours As Needed for Muscle Spasms.     • topiramate (TOPAMAX) 100 MG tablet Take 150 mg by mouth Daily.     •  traZODone (DESYREL) 50 MG tablet Take 50 mg by mouth Every Night.     • Umeclidinium Bromide (INCRUSE ELLIPTA) 62.5 MCG/INH aerosol powder  Inhale 1 puff Daily.       No current facility-administered medications on file prior to visit.        ALLERGIES  Patient has no known allergies.    HISTORY  Past Medical History:   Diagnosis Date   • Anxiety and depression    • Atypical chest pain    • Diabetes mellitus (CMS/HCC)    • Dyslipidemia    • GERD (gastroesophageal reflux disease)    • H/O valvular heart disease     Echocardiogram 2013 revealing EF 60%. Mild left atrial enlargement, trace AI, trace MR, mild TR.   • Hypertension    • Insomnia    • Migraine headache     Chronic migraine headaches/Topamax therapy.    • Moderate obesity    • AMANDEEP on CPAP    • Pituitary adenoma (CMS/[facility])     History of pituitary adenoma/bromocriptine therapy.    • Sinus bradycardia     Severe symptomatic bradycardia secondary to sinus node dysfunction and sinus bradycardia with pacemaker at elective replacement indicator.   • SVT (supraventricular tachycardia) (CMS/[facility])     With prior ablation.       Social History     Socioeconomic History   • Marital status:      Spouse name: Not on file   • Number of children: Not on file   • Years of education: Not on file   • Highest education level: Not on file   Tobacco Use   • Smoking status: Never Smoker   • Smokeless tobacco: Never Used   Substance and Sexual Activity   • Alcohol use: No   • Drug use: No   • Sexual activity: Defer       Family History   Problem Relation Age of Onset   • Heart disease Mother    • Heart failure Mother    • Heart disease Father    • Heart failure Father        Review of Systems   Constitutional: Negative for activity change and appetite change.   HENT: Negative for congestion and drooling.    Eyes: Negative for discharge and itching.   Respiratory: Positive for shortness of breath.    Cardiovascular: Positive for leg swelling. Negative for chest pain and  "palpitations.   Gastrointestinal: Negative for abdominal distention and abdominal pain.   Endocrine: Negative for cold intolerance and heat intolerance.   Genitourinary: Negative for flank pain.   Musculoskeletal: Negative for neck stiffness.   Skin: Negative for color change and pallor.   Allergic/Immunologic: Negative for immunocompromised state.   Neurological: Positive for dizziness.   Hematological: Does not bruise/bleed easily.   Psychiatric/Behavioral: Negative for agitation and behavioral problems.       Objective     VITALS: /100 (BP Location: Left arm, Patient Position: Sitting, Cuff Size: Adult)   Pulse 112   Ht 170.2 cm (67\")   Wt 83.9 kg (185 lb)   BMI 28.98 kg/m²     LABS:   Lab Results (most recent)     None          IMAGING:   Ct Head Without Contrast    Result Date: 4/1/2020  Normal, negative unenhanced head CT. Signer Name: Michael Smith MD  Signed: 4/1/2020 10:21 PM  Workstation Name: [facility]  [facility]    Xr Chest 1 View    Result Date: 4/1/2020  Right upper lobe consolidation, very possibly pneumonia  This report was finalized on 4/1/2020 6:15 PM by Dr. Oleg Harris MD.      Ct Chest Pulmonary Embolism    Result Date: 4/1/2020  1. No evidence of a pulmonary embolus 2. The area in question in the right upper lobe on plain radiography is not evident on the CT scan and may have represented superimposition of adjacent structures..  This report was finalized on 4/1/2020 7:25 PM by Dr. Oleg Harris MD.      Us Carotid Bilateral    Result Date: 4/2/2020  Impression: No hemodynamically significant stenosis. Heterogeneous appearance of right lobe of thyroid. Multiple nodules. Further evaluation with thyroid ultrasound recommended. Comments: <50% stenosis: PSV <125cm/s and Ratio of <2 50-69% stenosis: -229cm/s and Ratio of 2-3.9 70-99% stenosis: PSV >230cm/s and Ratio of >4  This report was finalized on 4/2/2020 8:27 AM by Dr. Awais Farris MD.        "       EXAM:  Physical Exam  Patient was elevated by the telephone due to coronavirus pandemic per auscultation via the telephone patient's breathing rate was within normal limits speech was clear no audible wheezing was noted patient was able to speak full sentences without difficulty the mood was appropriate for the situation and was able to answer questions appropriately no apparent distress.  Procedure   Procedures       Assessment/Plan    Diagnosis Plan   1. Precordial chest pain     2. Presence of cardiac pacemaker     3. Essential hypertension     4. Mixed hyperlipidemia     5. Paroxysmal atrial fibrillation (CMS/HCC)     6. AMANDEEP on CPAP     1.  No further chest pain her stress test was negative we will continue the current management  2.  Her blood pressure is elevated I am going to increase the dose of the carvedilol to 12.5 mg twice daily  3.  Regarding his atrial fibrillation apparently the Eliquis is not approved by the insurance we will switch this to Xarelto 20 mg/day  4.  Pacemaker was interrogated with good function  5. patient is using CPAP every night we will continue to do so  See her back in 1 month's time to reassess her blood pressure control    No follow-ups on file.    Lian was seen today for follow-up, shortness of breath, edema and dizziness.    Diagnoses and all orders for this visit:    Precordial chest pain    Presence of cardiac pacemaker    Essential hypertension    Mixed hyperlipidemia    Paroxysmal atrial fibrillation (CMS/HCC)    AMANDEEP on CPAP               This visit has been rescheduled as a phone visit to comply with patient safety concerns in accordance with CDC recommendations. Total time of discussion was 8  minutes.           MEDS ORDERED DURING VISIT:  No orders of the defined types were placed in this encounter.        This document has been electronically signed by Anthony Faustin MD  April 16, 2020 11:10

## 2020-05-04 ENCOUNTER — OFFICE VISIT (OUTPATIENT)
Dept: CARDIOLOGY | Facility: CLINIC | Age: 60
End: 2020-05-04

## 2020-05-04 VITALS
BODY MASS INDEX: 28.72 KG/M2 | SYSTOLIC BLOOD PRESSURE: 149 MMHG | WEIGHT: 183 LBS | DIASTOLIC BLOOD PRESSURE: 91 MMHG | HEIGHT: 67 IN | HEART RATE: 89 BPM

## 2020-05-04 DIAGNOSIS — E78.2 MIXED HYPERLIPIDEMIA: ICD-10-CM

## 2020-05-04 DIAGNOSIS — G47.33 OSA ON CPAP: Chronic | ICD-10-CM

## 2020-05-04 DIAGNOSIS — R06.02 SHORTNESS OF BREATH: ICD-10-CM

## 2020-05-04 DIAGNOSIS — Z95.0 PRESENCE OF CARDIAC PACEMAKER: ICD-10-CM

## 2020-05-04 DIAGNOSIS — Z99.89 OSA ON CPAP: Chronic | ICD-10-CM

## 2020-05-04 DIAGNOSIS — I10 ESSENTIAL HYPERTENSION: Primary | ICD-10-CM

## 2020-05-04 DIAGNOSIS — I48.0 PAROXYSMAL ATRIAL FIBRILLATION (HCC): ICD-10-CM

## 2020-05-04 DIAGNOSIS — R07.2 PRECORDIAL CHEST PAIN: ICD-10-CM

## 2020-05-04 PROCEDURE — 99214 OFFICE O/P EST MOD 30 MIN: CPT | Performed by: SPECIALIST

## 2020-05-04 RX ORDER — ROSUVASTATIN CALCIUM 20 MG/1
20 TABLET, COATED ORAL DAILY
Qty: 90 TABLET | Refills: 1 | Status: SHIPPED | OUTPATIENT
Start: 2020-05-04 | End: 2020-05-28 | Stop reason: SDUPTHER

## 2020-05-04 RX ORDER — LISINOPRIL 2.5 MG/1
10 TABLET ORAL DAILY
Qty: 90 TABLET | Refills: 1 | Status: SHIPPED | OUTPATIENT
Start: 2020-05-04 | End: 2020-05-28 | Stop reason: SDUPTHER

## 2020-05-04 RX ORDER — LISINOPRIL 2.5 MG/1
10 TABLET ORAL DAILY
Status: SHIPPED | COMMUNITY
Start: 2020-05-04 | End: 2020-05-04 | Stop reason: SDUPTHER

## 2020-05-04 RX ORDER — ROSUVASTATIN CALCIUM 20 MG/1
20 TABLET, COATED ORAL DAILY
COMMUNITY
End: 2020-05-04 | Stop reason: SDUPTHER

## 2020-05-04 RX ORDER — FLECAINIDE ACETATE 50 MG/1
50 TABLET ORAL EVERY 12 HOURS
COMMUNITY
End: 2020-05-28 | Stop reason: SDUPTHER

## 2020-05-04 RX ORDER — LISINOPRIL 2.5 MG/1
2.5 TABLET ORAL DAILY
COMMUNITY
End: 2020-05-04

## 2020-05-04 NOTE — PROGRESS NOTES
"Subjective   Follow up, Hypertension  Lian Kang is a 60 y.o. female who presents to day for Atrial Fibrillation (anticoag therapy follow); Chest Pain (\"some\"); Palpitations (races); Shortness of Breath (routine activity, LE edema); and Med Management (verbal).    CHIEF COMPLIANT  Chief Complaint   Patient presents with   • Atrial Fibrillation     anticoag therapy follow   • Chest Pain     \"some\"   • Palpitations     races   • Shortness of Breath     routine activity, LE edema   • Med Management     verbal       Active Problems:  Problem List Items Addressed This Visit        Cardiovascular and Mediastinum    Presence of cardiac pacemaker    Essential hypertension - Primary    Relevant Medications    lisinopril (PRINIVIL,ZESTRIL) 2.5 MG tablet    Mixed hyperlipidemia    Paroxysmal atrial fibrillation (CMS/HCC)       Respiratory    AMANDEEP on CPAP (Chronic)    Shortness of breath       Nervous and Auditory    Precordial chest pain          HPI  You have chosen to receive care through a telephone visit. Do you consent to use a telephone visit for your medical care today? Yes    Blood pressure has been slightly labile but a little bit up she denies any other new symptoms except for occasional chest pains on and off which has not changed much comparing with her last visit also shortness of breath on moderate exertion which has been stable no edema or any other cardiac symptoms    PRIOR MEDS  Current Outpatient Medications on File Prior to Visit   Medication Sig Dispense Refill   • bromocriptine (PARLODEL) 2.5 MG tablet Take 2.5 mg by mouth Daily.     • busPIRone (BUSPAR) 10 MG tablet Take 10 mg by mouth 3 (Three) Times a Day As Needed (anxiety).     • carvedilol (COREG) 12.5 MG tablet Take 1 tablet by mouth 2 (Two) Times a Day. 180 tablet 1   • cetirizine (zyrTEC) 10 MG tablet Take 10 mg by mouth Daily.     • clonazePAM (KlonoPIN) 0.5 MG tablet Take 0.25 mg by mouth 3 (Three) Times a Day As Needed for Anxiety.     • " docusate sodium (COLACE) 100 MG capsule Take 100 mg by mouth 2 (Two) Times a Day As Needed for Constipation.     • escitalopram (LEXAPRO) 10 MG tablet Take 10 mg by mouth Daily.     • flecainide (TAMBOCOR) 50 MG tablet Take 50 mg by mouth Every 12 (Twelve) Hours.     • fluticasone (FLONASE) 50 MCG/ACT nasal spray 2 sprays into the nostril(s) as directed by provider Daily.     • furosemide (LASIX) 20 MG tablet Take 20 mg by mouth daily.     • gabapentin (NEURONTIN) 300 MG capsule Take 300 mg by mouth 3 (Three) Times a Day.     • HYDROcodone-acetaminophen (NORCO) 5-325 MG per tablet Take 1 tablet by mouth Every 6 (Six) Hours As Needed for Moderate Pain .     • isosorbide mononitrate (IMDUR) 30 MG 24 hr tablet Take 30 mg by mouth Daily.     • levalbuterol (XOPENEX HFA) 45 MCG/ACT inhaler Inhale 1-2 puffs Every 4 (Four) Hours As Needed for Wheezing.     • lisinopril (PRINIVIL,ZESTRIL) 2.5 MG tablet Take 2.5 mg by mouth Daily.     • metFORMIN (GLUCOPHAGE) 500 MG tablet Take 500 mg by mouth Daily With Breakfast.     • methocarbamol (ROBAXIN) 500 MG tablet Take 500 mg by mouth 3 (Three) Times a Day As Needed for Muscle Spasms.     • nitroglycerin (NITROSTAT) 0.4 MG SL tablet Place 0.4 mg under the tongue Every 5 (Five) Minutes As Needed for Chest Pain. Take no more than 3 doses in 15 minutes.     • pantoprazole (PROTONIX) 40 MG EC tablet Take 40 mg by mouth Daily.     • polyethylene glycol (MIRALAX) packet Take 17 g by mouth Daily.     • potassium chloride (K-DUR,KLOR-CON) 10 MEQ CR tablet Take 10 mEq by mouth 2 (Two) Times a Day.     • promethazine (PHENERGAN) 25 MG tablet Take 25 mg by mouth 3 (Three) Times a Day As Needed for Nausea or Vomiting.     • rivaroxaban (Xarelto) 20 MG tablet Take 1 tablet by mouth Daily. 90 tablet 1   • sertraline (ZOLOFT) 100 MG tablet Take 1 tablet by mouth Daily. (Patient taking differently: Take 150 mg by mouth Daily. Take 1 & 1/2 tab of 100mg per dose) 90 tablet 1   • tiZANidine  (ZANAFLEX) 4 MG tablet Take 4 mg by mouth Every 8 (Eight) Hours As Needed for Muscle Spasms.     • topiramate (TOPAMAX) 100 MG tablet Take 100 mg by mouth 2 (Two) Times a Day.     • traZODone (DESYREL) 50 MG tablet Take 50 mg by mouth 2 (Two) Times a Day.     • Umeclidinium Bromide (INCRUSE ELLIPTA) 62.5 MCG/INH aerosol powder  Inhale 1 puff Daily.     • [DISCONTINUED] rosuvastatin (CRESTOR) 20 MG tablet Take 20 mg by mouth Daily.     • albuterol sulfate  (90 Base) MCG/ACT inhaler Inhale 2 puffs 4 (Four) Times a Day As Needed for Wheezing.     • magnesium oxide (MAGOX) 400 (241.3 Mg) MG tablet tablet Take 400 mg by mouth 2 (Two) Times a Day.     • raNITIdine (ZANTAC) 150 MG tablet Take 150 mg by mouth Every Evening.     • [DISCONTINUED] pravastatin (PRAVACHOL) 80 MG tablet Take 80 mg by mouth Every Night.       No current facility-administered medications on file prior to visit.        ALLERGIES  Patient has no known allergies.    HISTORY  Past Medical History:   Diagnosis Date   • Anxiety and depression    • Atypical chest pain    • Diabetes mellitus (CMS/HCC)    • Dyslipidemia    • GERD (gastroesophageal reflux disease)    • H/O valvular heart disease     Echocardiogram 2013 revealing EF 60%. Mild left atrial enlargement, trace AI, trace MR, mild TR.   • Hypertension    • Insomnia    • Migraine headache     Chronic migraine headaches/Topamax therapy.    • Moderate obesity    • AMANDEEP on CPAP    • Pituitary adenoma (CMS/HCC)     History of pituitary adenoma/bromocriptine therapy.    • Sinus bradycardia     Severe symptomatic bradycardia secondary to sinus node dysfunction and sinus bradycardia with pacemaker at elective replacement indicator.   • SVT (supraventricular tachycardia) (CMS/HCC)     With prior ablation.       Social History     Socioeconomic History   • Marital status:      Spouse name: Not on file   • Number of children: Not on file   • Years of education: Not on file   • Highest education  "level: Not on file   Tobacco Use   • Smoking status: Never Smoker   • Smokeless tobacco: Never Used   Substance and Sexual Activity   • Alcohol use: No   • Drug use: No   • Sexual activity: Defer       Family History   Problem Relation Age of Onset   • Heart disease Mother    • Heart failure Mother    • Heart disease Father    • Heart failure Father        Review of Systems   Constitutional: Negative for activity change and appetite change.   HENT: Negative for congestion.    Eyes: Negative for discharge and itching.   Respiratory: Positive for shortness of breath. Negative for chest tightness.    Cardiovascular: Positive for chest pain.   Gastrointestinal: Negative for abdominal distention and abdominal pain.   Endocrine: Negative for cold intolerance and heat intolerance.   Genitourinary: Negative for flank pain.   Musculoskeletal: Negative for neck stiffness.   Skin: Negative for color change and pallor.   Allergic/Immunologic: Negative for immunocompromised state.   Hematological: Does not bruise/bleed easily.   Psychiatric/Behavioral: Negative for agitation and behavioral problems.       Objective     VITALS: /91   Pulse 89   Ht 170.2 cm (67\")   Wt 83 kg (183 lb)   BMI 28.66 kg/m²     LABS:   Lab Results (most recent)     None          IMAGING:   Ct Head Without Contrast    Result Date: 4/1/2020  Normal, negative unenhanced head CT. Signer Name: Michael Smith MD  Signed: 4/1/2020 10:21 PM  Workstation Name: [facility]  [facility]    Xr Chest 1 View    Result Date: 4/1/2020  Right upper lobe consolidation, very possibly pneumonia  This report was finalized on 4/1/2020 6:15 PM by Dr. Oleg Harris MD.      Ct Chest Pulmonary Embolism    Result Date: 4/1/2020  1. No evidence of a pulmonary embolus 2. The area in question in the right upper lobe on plain radiography is not evident on the CT scan and may have represented superimposition of adjacent structures..  This report was " finalized on 4/1/2020 7:25 PM by Dr. Oleg Harris MD.      Us Carotid Bilateral    Result Date: 4/2/2020  Impression: No hemodynamically significant stenosis. Heterogeneous appearance of right lobe of thyroid. Multiple nodules. Further evaluation with thyroid ultrasound recommended. Comments: <50% stenosis: PSV <125cm/s and Ratio of <2 50-69% stenosis: -229cm/s and Ratio of 2-3.9 70-99% stenosis: PSV >230cm/s and Ratio of >4  This report was finalized on 4/2/2020 8:27 AM by Dr. Awais Farris MD.        EXAM:  Physical Exam  Patient was elevated by the telephone due to coronavirus pandemic per auscultation via the telephone patient's breathing rate was within normal limits speech was clear no audible wheezing was noted patient was able to speak full sentences without difficulty the mood was appropriate for the situation and was able to answer questions appropriately no apparent distress.  Procedure   Procedures       Assessment/Plan    Diagnosis Plan   1. Essential hypertension     2. Presence of cardiac pacemaker     3. Mixed hyperlipidemia     4. Paroxysmal atrial fibrillation (CMS/HCC)     5. AMANDEEP on CPAP     6. Shortness of breath     7. Precordial chest pain     1. Blood pressure continues to be a little bit elevated so I am going to increase the dose of the lisinopril to 10 mg/day  2.  I discussed her labs with her with suboptimal LDL she has been taking pravastatin 80 mg/day so I am going to switch this to rosuvastatin to 20 mg/day  3.  We will monitor her pacemaker this was also checked recently by Dr. Fletcher for electrophysiology service with good function  4.  Chest pain is stable with atypical most of the time we will continue current medication  5.  Regarding atrial fibrillation continue with the current medications include flecainide and the anticoagulation  6.  She is using CPAP every night she encouraged to continue so  No follow-ups on file.    Lian was seen today for atrial fibrillation,  chest pain, palpitations, shortness of breath and med management.    Diagnoses and all orders for this visit:    Essential hypertension    Presence of cardiac pacemaker    Mixed hyperlipidemia    Paroxysmal atrial fibrillation (CMS/HCC)    AMANDEEP on CPAP    Shortness of breath    Precordial chest pain        This visit has been rescheduled as a phone visit to comply with patient safety concerns in accordance with CDC recommendations. Total time of discussion was 7 minutes.           MEDS ORDERED DURING VISIT:  No orders of the defined types were placed in this encounter.        This document has been electronically signed by Anthony Faustin MD  May 4, 2020 12:58

## 2020-05-28 ENCOUNTER — OFFICE VISIT (OUTPATIENT)
Dept: CARDIOLOGY | Facility: CLINIC | Age: 60
End: 2020-05-28

## 2020-05-28 VITALS
HEIGHT: 67 IN | RESPIRATION RATE: 16 BRPM | DIASTOLIC BLOOD PRESSURE: 86 MMHG | TEMPERATURE: 97.4 F | BODY MASS INDEX: 28.88 KG/M2 | HEART RATE: 97 BPM | SYSTOLIC BLOOD PRESSURE: 123 MMHG | WEIGHT: 184 LBS

## 2020-05-28 DIAGNOSIS — Z95.0 PRESENCE OF CARDIAC PACEMAKER: ICD-10-CM

## 2020-05-28 DIAGNOSIS — I48.0 PAROXYSMAL ATRIAL FIBRILLATION (HCC): ICD-10-CM

## 2020-05-28 DIAGNOSIS — R06.02 SHORTNESS OF BREATH: ICD-10-CM

## 2020-05-28 DIAGNOSIS — E78.2 MIXED HYPERLIPIDEMIA: ICD-10-CM

## 2020-05-28 DIAGNOSIS — R07.2 PRECORDIAL CHEST PAIN: ICD-10-CM

## 2020-05-28 DIAGNOSIS — I10 ESSENTIAL HYPERTENSION: Primary | ICD-10-CM

## 2020-05-28 PROCEDURE — 99214 OFFICE O/P EST MOD 30 MIN: CPT | Performed by: SPECIALIST

## 2020-05-28 RX ORDER — LISINOPRIL 2.5 MG/1
10 TABLET ORAL DAILY
Qty: 90 TABLET | Refills: 1 | Status: SHIPPED | OUTPATIENT
Start: 2020-05-28 | End: 2020-06-26

## 2020-05-28 RX ORDER — CARVEDILOL 12.5 MG/1
12.5 TABLET ORAL 2 TIMES DAILY
Qty: 180 TABLET | Refills: 1 | Status: SHIPPED | OUTPATIENT
Start: 2020-05-28 | End: 2020-06-26

## 2020-05-28 RX ORDER — NITROGLYCERIN 0.4 MG/1
0.4 TABLET SUBLINGUAL
Qty: 90 TABLET | Refills: 1 | Status: SHIPPED | OUTPATIENT
Start: 2020-05-28 | End: 2020-08-27 | Stop reason: SDUPTHER

## 2020-05-28 RX ORDER — FUROSEMIDE 20 MG/1
20 TABLET ORAL DAILY
Qty: 90 TABLET | Refills: 1 | Status: SHIPPED | OUTPATIENT
Start: 2020-05-28 | End: 2020-08-27 | Stop reason: SDUPTHER

## 2020-05-28 RX ORDER — FLECAINIDE ACETATE 50 MG/1
50 TABLET ORAL EVERY 12 HOURS
Qty: 180 TABLET | Refills: 1 | Status: SHIPPED | OUTPATIENT
Start: 2020-05-28 | End: 2020-06-26

## 2020-05-28 RX ORDER — CARVEDILOL 12.5 MG/1
12.5 TABLET ORAL 2 TIMES DAILY
Qty: 180 TABLET | Refills: 1 | Status: SHIPPED | OUTPATIENT
Start: 2020-05-28 | End: 2020-05-28 | Stop reason: SDUPTHER

## 2020-05-28 RX ORDER — ISOSORBIDE MONONITRATE 30 MG/1
30 TABLET, EXTENDED RELEASE ORAL DAILY
Qty: 90 TABLET | Refills: 1 | Status: SHIPPED | OUTPATIENT
Start: 2020-05-28 | End: 2020-08-27 | Stop reason: SDUPTHER

## 2020-05-28 RX ORDER — ROSUVASTATIN CALCIUM 20 MG/1
20 TABLET, COATED ORAL DAILY
Qty: 90 TABLET | Refills: 1 | Status: SHIPPED | OUTPATIENT
Start: 2020-05-28 | End: 2020-06-26

## 2020-05-28 NOTE — PROGRESS NOTES
Subjective   Follow up, Hypertension  Lian Kang is a 60 y.o. female who presents to day for Atrial Fibrillation.    CHIEF COMPLIANT  Chief Complaint   Patient presents with   • Atrial Fibrillation       Active Problems:  Problem List Items Addressed This Visit        Cardiovascular and Mediastinum    Presence of cardiac pacemaker    Essential hypertension - Primary    Paroxysmal atrial fibrillation (CMS/HCC)       Respiratory    Shortness of breath       Nervous and Auditory    Precordial chest pain          HPI  HPI    PRIOR MEDS  Current Outpatient Medications on File Prior to Visit   Medication Sig Dispense Refill   • albuterol sulfate  (90 Base) MCG/ACT inhaler Inhale 2 puffs 4 (Four) Times a Day As Needed for Wheezing.     • bromocriptine (PARLODEL) 2.5 MG tablet Take 2.5 mg by mouth Daily.     • busPIRone (BUSPAR) 10 MG tablet Take 10 mg by mouth 3 (Three) Times a Day As Needed (anxiety).     • carvedilol (COREG) 12.5 MG tablet Take 1 tablet by mouth 2 (Two) Times a Day. (Patient taking differently: Take 6.25 mg by mouth 2 (Two) Times a Day.) 180 tablet 1   • cetirizine (zyrTEC) 10 MG tablet Take 10 mg by mouth Daily.     • clonazePAM (KlonoPIN) 0.5 MG tablet Take 0.25 mg by mouth 3 (Three) Times a Day As Needed for Anxiety.     • docusate sodium (COLACE) 100 MG capsule Take 100 mg by mouth 2 (Two) Times a Day As Needed for Constipation.     • escitalopram (LEXAPRO) 10 MG tablet Take 10 mg by mouth Daily.     • flecainide (TAMBOCOR) 50 MG tablet Take 50 mg by mouth Every 12 (Twelve) Hours.     • fluticasone (FLONASE) 50 MCG/ACT nasal spray 2 sprays into the nostril(s) as directed by provider Daily.     • furosemide (LASIX) 20 MG tablet Take 20 mg by mouth daily.     • gabapentin (NEURONTIN) 300 MG capsule Take 300 mg by mouth 3 (Three) Times a Day.     • HYDROcodone-acetaminophen (NORCO) 5-325 MG per tablet Take 1 tablet by mouth Every 6 (Six) Hours As Needed for Moderate Pain .     • isosorbide  mononitrate (IMDUR) 30 MG 24 hr tablet Take 30 mg by mouth Daily.     • levalbuterol (XOPENEX HFA) 45 MCG/ACT inhaler Inhale 1-2 puffs Every 4 (Four) Hours As Needed for Wheezing.     • lisinopril (PRINIVIL,ZESTRIL) 2.5 MG tablet Take 4 tablets by mouth Daily. 90 tablet 1   • magnesium oxide (MAGOX) 400 (241.3 Mg) MG tablet tablet Take 400 mg by mouth 2 (Two) Times a Day.     • metFORMIN (GLUCOPHAGE) 500 MG tablet Take 500 mg by mouth Daily With Breakfast.     • methocarbamol (ROBAXIN) 500 MG tablet Take 500 mg by mouth 3 (Three) Times a Day As Needed for Muscle Spasms.     • nitroglycerin (NITROSTAT) 0.4 MG SL tablet Place 0.4 mg under the tongue Every 5 (Five) Minutes As Needed for Chest Pain. Take no more than 3 doses in 15 minutes.     • pantoprazole (PROTONIX) 40 MG EC tablet Take 40 mg by mouth Daily.     • polyethylene glycol (MIRALAX) packet Take 17 g by mouth Daily.     • potassium chloride (K-DUR,KLOR-CON) 10 MEQ CR tablet Take 10 mEq by mouth 2 (Two) Times a Day.     • promethazine (PHENERGAN) 25 MG tablet Take 25 mg by mouth 3 (Three) Times a Day As Needed for Nausea or Vomiting.     • raNITIdine (ZANTAC) 150 MG tablet Take 150 mg by mouth Every Evening.     • rivaroxaban (Xarelto) 20 MG tablet Take 1 tablet by mouth Daily. 90 tablet 1   • rosuvastatin (CRESTOR) 20 MG tablet Take 1 tablet by mouth Daily. 90 tablet 1   • sertraline (ZOLOFT) 100 MG tablet Take 1 tablet by mouth Daily. (Patient taking differently: Take 150 mg by mouth Daily. Take 1 & 1/2 tab of 100mg per dose) 90 tablet 1   • tiZANidine (ZANAFLEX) 4 MG tablet Take 4 mg by mouth Every 8 (Eight) Hours As Needed for Muscle Spasms.     • topiramate (TOPAMAX) 100 MG tablet Take 100 mg by mouth 2 (Two) Times a Day.     • traZODone (DESYREL) 50 MG tablet Take 50 mg by mouth 2 (Two) Times a Day.     • Umeclidinium Bromide (INCRUSE ELLIPTA) 62.5 MCG/INH aerosol powder  Inhale 1 puff Daily.       No current facility-administered medications on file  prior to visit.        ALLERGIES  Patient has no known allergies.    HISTORY  Past Medical History:   Diagnosis Date   • Anxiety and depression    • Atypical chest pain    • Diabetes mellitus (CMS/HCC)    • Dyslipidemia    • GERD (gastroesophageal reflux disease)    • H/O valvular heart disease     Echocardiogram 2013 revealing EF 60%. Mild left atrial enlargement, trace AI, trace MR, mild TR.   • Hypertension    • Insomnia    • Migraine headache     Chronic migraine headaches/Topamax therapy.    • Moderate obesity    • AMANDEEP on CPAP    • Pituitary adenoma (CMS/HCC)     History of pituitary adenoma/bromocriptine therapy.    • Sinus bradycardia     Severe symptomatic bradycardia secondary to sinus node dysfunction and sinus bradycardia with pacemaker at elective replacement indicator.   • SVT (supraventricular tachycardia) (CMS/HCC)     With prior ablation.       Social History     Socioeconomic History   • Marital status:      Spouse name: Not on file   • Number of children: Not on file   • Years of education: Not on file   • Highest education level: Not on file   Tobacco Use   • Smoking status: Never Smoker   • Smokeless tobacco: Never Used   Substance and Sexual Activity   • Alcohol use: No   • Drug use: No   • Sexual activity: Defer       Family History   Problem Relation Age of Onset   • Heart disease Mother    • Heart failure Mother    • Heart disease Father    • Heart failure Father        Review of Systems   Constitutional: Negative for activity change and appetite change.   Respiratory: Positive for chest tightness and shortness of breath.    Cardiovascular: Positive for chest pain and leg swelling.   Gastrointestinal: Negative for abdominal distention and abdominal pain.   Endocrine: Negative for cold intolerance and heat intolerance.   Genitourinary: Negative for flank pain.   Musculoskeletal: Negative for neck stiffness.   Skin: Negative for color change and pallor.   Allergic/Immunologic: Negative  "for immunocompromised state.   Neurological: Negative for facial asymmetry.   Hematological: Does not bruise/bleed easily.   Psychiatric/Behavioral: Negative for agitation and behavioral problems.       Objective     VITALS: /86 (BP Location: Right arm)   Pulse 97   Temp 97.4 °F (36.3 °C)   Resp 16   Ht 170.2 cm (67.01\")   Wt 83.5 kg (184 lb)   BMI 28.81 kg/m²     LABS:   Lab Results (most recent)     None          IMAGING:   Ct Head Without Contrast    Result Date: 4/1/2020  Normal, negative unenhanced head CT. Signer Name: Michael Smith MD  Signed: 4/1/2020 10:21 PM  Workstation Name: [facility]  [facility]    Xr Chest 1 View    Result Date: 4/1/2020  Right upper lobe consolidation, very possibly pneumonia  This report was finalized on 4/1/2020 6:15 PM by Dr. Oleg Harris MD.      Ct Chest Pulmonary Embolism    Result Date: 4/1/2020  1. No evidence of a pulmonary embolus 2. The area in question in the right upper lobe on plain radiography is not evident on the CT scan and may have represented superimposition of adjacent structures..  This report was finalized on 4/1/2020 7:25 PM by Dr. Oleg Harris MD.      Us Carotid Bilateral    Result Date: 4/2/2020  Impression: No hemodynamically significant stenosis. Heterogeneous appearance of right lobe of thyroid. Multiple nodules. Further evaluation with thyroid ultrasound recommended. Comments: <50% stenosis: PSV <125cm/s and Ratio of <2 50-69% stenosis: -229cm/s and Ratio of 2-3.9 70-99% stenosis: PSV >230cm/s and Ratio of >4  This report was finalized on 4/2/2020 8:27 AM by Dr. Awais Farris MD.        EXAM:  Physical Exam   Constitutional: She is oriented to person, place, and time. She appears well-developed and well-nourished.   HENT:   Head: Normocephalic and atraumatic.   Eyes: Pupils are equal, round, and reactive to light.   Neck: Neck supple. No JVD present. No thyromegaly present.   Cardiovascular: Normal rate, " regular rhythm, normal heart sounds and intact distal pulses. Exam reveals no gallop and no friction rub.   No murmur heard.  Pacer pocket clean and nontender   Pulmonary/Chest: Effort normal and breath sounds normal. No stridor. No respiratory distress. She has no wheezes. She has no rales. She exhibits no tenderness.   Musculoskeletal: She exhibits no edema, tenderness or deformity.   Neurological: She is alert and oriented to person, place, and time. No cranial nerve deficit. Coordination normal.   Skin: Skin is warm and dry.   Psychiatric: She has a normal mood and affect.   Vitals reviewed.      Procedure   Procedures       Assessment/Plan    Diagnosis Plan   1. Essential hypertension     2. Paroxysmal atrial fibrillation (CMS/HCC)     3. Shortness of breath     4. Precordial chest pain     5. Presence of cardiac pacemaker       1.  Her blood pressure is much better now we will continue with the current medications he still have some elevation of blood pressure at different times of the day but this could be related to the stress he is going through right now  2.  She is attaining in sinus rhythm will continue current management  3.  She still having intermittent shortness of breath significant on exertion  4.  Chest pain is atypical we will increase the dose of the Coreg she has been taking only 6.25 mg twice daily make it 12.5 mg twice daily  No follow-ups on file.    Lian was seen today for atrial fibrillation.    Diagnoses and all orders for this visit:    Essential hypertension    Paroxysmal atrial fibrillation (CMS/HCC)    Shortness of breath    Precordial chest pain    Presence of cardiac pacemaker               MEDS ORDERED DURING VISIT:  No orders of the defined types were placed in this encounter.        This document has been electronically signed by Anthony Faustin MD  May 28, 2020 12:51

## 2020-06-26 ENCOUNTER — OFFICE VISIT (OUTPATIENT)
Dept: CARDIOLOGY | Facility: CLINIC | Age: 60
End: 2020-06-26

## 2020-06-26 VITALS
HEIGHT: 67 IN | OXYGEN SATURATION: 98 % | BODY MASS INDEX: 28.88 KG/M2 | SYSTOLIC BLOOD PRESSURE: 112 MMHG | DIASTOLIC BLOOD PRESSURE: 78 MMHG | WEIGHT: 184 LBS | HEART RATE: 92 BPM

## 2020-06-26 DIAGNOSIS — R55 SYNCOPE AND COLLAPSE: Primary | ICD-10-CM

## 2020-06-26 DIAGNOSIS — R00.1 SINUS BRADYCARDIA: ICD-10-CM

## 2020-06-26 DIAGNOSIS — Z95.0 PRESENCE OF CARDIAC PACEMAKER: ICD-10-CM

## 2020-06-26 PROCEDURE — 99213 OFFICE O/P EST LOW 20 MIN: CPT | Performed by: INTERNAL MEDICINE

## 2020-06-26 PROCEDURE — 93280 PM DEVICE PROGR EVAL DUAL: CPT | Performed by: INTERNAL MEDICINE

## 2020-06-26 RX ORDER — PRAVASTATIN SODIUM 40 MG
TABLET ORAL DAILY
COMMUNITY
Start: 2020-06-16 | End: 2020-08-27 | Stop reason: SDUPTHER

## 2020-06-26 RX ORDER — FAMOTIDINE 20 MG/1
TABLET, FILM COATED ORAL DAILY
COMMUNITY
Start: 2020-06-04

## 2020-06-26 RX ORDER — METOPROLOL SUCCINATE 50 MG/1
TABLET, EXTENDED RELEASE ORAL DAILY
COMMUNITY
Start: 2020-06-08 | End: 2020-08-27 | Stop reason: SDUPTHER

## 2020-06-26 NOTE — PROGRESS NOTES
Lian Kang  1960  PCP: Arben Ibrahim MD    SUBJECTIVE:   Lian Kang is a 60 y.o. female seen for a follow up visit regarding the following:     Chief Complaint: Follow up for bradycardia    HPI:    Since last visit the patient's status has been stable. No further CP or SOB. Under stress at home,  has cancer. Very tired and fatigue.     History:       Cardiac PMH: (Old records have been reviewed and summarized below)  1.  Tachypalpitations:  a. Status post EP study and catheter ablation, 09/18/2014  for typical atrial flutter and typical AV gisella reentrant tachycardia.    b. 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  c. Initiation of flecainide and  metoprolol therapy.  d. Normal LV function by recent cardiac stress test.   2.  Apparent reported symptomatic bradycardia on metoprolol therapy/status post dual chamber [place] permanent pacemaker implantation by Dr. Michael Mcknight in 2010.  3. Atypical chest pain:  a. Left heart catheterization 2005 revealing no significant coronary artery disease.  b. Left heart catheterization 2010 revealing normal coronaries.  EF 65%.  c. Cardiolite  stress test 2013 revealing thick small apical wall defect, normal LV function, EF 61%.   4.  Chronic migraine headaches/Topamax therapy.   5. History of pituitary adenoma/bromocriptine therapy.   6. Insomnia.   7. Diabetes.   8. Hypertension.   9. GERD.  10. Dyslipidemia.   11. Depression/anxiety.  12. Moderate obesity.   13. Sleep apnea with home  CPAP.   14. History of valvular heart disease.  a. Echocardiogram 2013 revealing  EF 60%. Mild left atrial enlargement, trace AI, trace MR, mild TR.          Current Outpatient Medications:   •  albuterol sulfate  (90 Base) MCG/ACT inhaler, Inhale 2 puffs 4 (Four) Times a Day As Needed for Wheezing., Disp: , Rfl:   •  bromocriptine (PARLODEL) 2.5 MG tablet,  Take 2.5 mg by mouth Daily., Disp: , Rfl:   •  busPIRone (BUSPAR) 10 MG tablet, Take 10 mg by mouth 3 (Three) Times a Day As Needed (anxiety)., Disp: , Rfl:   •  cetirizine (zyrTEC) 10 MG tablet, Take 10 mg by mouth Daily., Disp: , Rfl:   •  clonazePAM (KlonoPIN) 0.5 MG tablet, Take 0.25 mg by mouth 3 (Three) Times a Day As Needed for Anxiety., Disp: , Rfl:   •  docusate sodium (COLACE) 100 MG capsule, Take 100 mg by mouth 2 (Two) Times a Day As Needed for Constipation., Disp: , Rfl:   •  escitalopram (LEXAPRO) 10 MG tablet, Take 10 mg by mouth Daily., Disp: , Rfl:   •  famotidine (PEPCID) 20 MG tablet, Daily., Disp: , Rfl:   •  fluticasone (FLONASE) 50 MCG/ACT nasal spray, 2 sprays into the nostril(s) as directed by provider Daily., Disp: , Rfl:   •  furosemide (LASIX) 20 MG tablet, Take 1 tablet by mouth Daily., Disp: 90 tablet, Rfl: 1  •  gabapentin (NEURONTIN) 300 MG capsule, Take 300 mg by mouth 3 (Three) Times a Day., Disp: , Rfl:   •  HYDROcodone-acetaminophen (NORCO) 5-325 MG per tablet, Take 1 tablet by mouth Every 6 (Six) Hours As Needed for Moderate Pain ., Disp: , Rfl:   •  isosorbide mononitrate (IMDUR) 30 MG 24 hr tablet, Take 1 tablet by mouth Daily., Disp: 90 tablet, Rfl: 1  •  levalbuterol (XOPENEX HFA) 45 MCG/ACT inhaler, Inhale 1-2 puffs Every 4 (Four) Hours As Needed for Wheezing., Disp: , Rfl:   •  magnesium oxide (MAGOX) 400 (241.3 Mg) MG tablet tablet, Take 400 mg by mouth 2 (Two) Times a Day., Disp: , Rfl:   •  metFORMIN (GLUCOPHAGE) 500 MG tablet, Take 500 mg by mouth Daily With Breakfast., Disp: , Rfl:   •  methocarbamol (ROBAXIN) 500 MG tablet, Take 500 mg by mouth 3 (Three) Times a Day As Needed for Muscle Spasms., Disp: , Rfl:   •  metoprolol succinate XL (TOPROL-XL) 50 MG 24 hr tablet, Daily., Disp: , Rfl:   •  nitroglycerin (NITROSTAT) 0.4 MG SL tablet, Place 1 tablet under the tongue Every 5 (Five) Minutes As Needed for Chest Pain. Take no more than 3 doses in 15 minutes., Disp: 90  tablet, Rfl: 1  •  pantoprazole (PROTONIX) 40 MG EC tablet, Take 40 mg by mouth Daily., Disp: , Rfl:   •  polyethylene glycol (MIRALAX) packet, Take 17 g by mouth Daily., Disp: , Rfl:   •  potassium chloride (K-DUR,KLOR-CON) 10 MEQ CR tablet, Take 10 mEq by mouth 2 (Two) Times a Day., Disp: , Rfl:   •  pravastatin (PRAVACHOL) 40 MG tablet, Daily., Disp: , Rfl:   •  promethazine (PHENERGAN) 25 MG tablet, Take 25 mg by mouth 3 (Three) Times a Day As Needed for Nausea or Vomiting., Disp: , Rfl:   •  raNITIdine (ZANTAC) 150 MG tablet, Take 150 mg by mouth Every Evening., Disp: , Rfl:   •  rivaroxaban (Xarelto) 20 MG tablet, Take 1 tablet by mouth Daily., Disp: 90 tablet, Rfl: 1  •  sertraline (ZOLOFT) 100 MG tablet, Take 1 tablet by mouth Daily. (Patient taking differently: Take 150 mg by mouth Daily. Take 1 & 1/2 tab of 100mg per dose), Disp: 90 tablet, Rfl: 1  •  topiramate (TOPAMAX) 100 MG tablet, Take 100 mg by mouth Daily., Disp: , Rfl:   •  traZODone (DESYREL) 50 MG tablet, Take 50 mg by mouth 2 (Two) Times a Day., Disp: , Rfl:   •  Umeclidinium Bromide (INCRUSE ELLIPTA) 62.5 MCG/INH aerosol powder , Inhale 1 puff Daily., Disp: , Rfl:     Past Medical History, Past Surgical History, Family history, Social History, and Medications were all reviewed with the patient today and updated as necessary.       Patient Active Problem List   Diagnosis   • SVT (supraventricular tachycardia) (CMS/HCC)   • Sinus bradycardia   • H/O valvular heart disease   • AMANDEEP on CPAP   • Moderate obesity   • Anxiety and depression   • GERD (gastroesophageal reflux disease)   • Diabetes mellitus (CMS/HCC)   • Insomnia   • Migraine headache   • Atypical chest pain   • Syncope and collapse   • Presence of biventricular cardiac pacemaker   • Presence of cardiac pacemaker   • Essential hypertension   • Mixed hyperlipidemia   • Paroxysmal atrial fibrillation (CMS/HCC)   • Shortness of breath   • Unstable angina (CMS/HCC)   • Precordial chest pain  "    No Known Allergies  Past Medical History:   Diagnosis Date   • Anxiety and depression    • Atypical chest pain    • Diabetes mellitus (CMS/HCC)    • Dyslipidemia    • GERD (gastroesophageal reflux disease)    • H/O valvular heart disease     Echocardiogram 2013 revealing EF 60%. Mild left atrial enlargement, trace AI, trace MR, mild TR.   • Hypertension    • Insomnia    • Migraine headache     Chronic migraine headaches/Topamax therapy.    • Moderate obesity    • AMANDEEP on CPAP    • Pituitary adenoma (CMS/HCC)     History of pituitary adenoma/bromocriptine therapy.    • Sinus bradycardia     Severe symptomatic bradycardia secondary to sinus node dysfunction and sinus bradycardia with pacemaker at elective replacement indicator.   • SVT (supraventricular tachycardia) (CMS/HCC)     With prior ablation.     Past Surgical History:   Procedure Laterality Date   • PACEMAKER IMPLANTATION  2010    Apparent reported symptomatic bradycardia on metoprolol therapy/status post dual chamber [place] permanent pacemaker implantation by Dr. Michael Mcknight in 2010.     Family History   Problem Relation Age of Onset   • Heart disease Mother    • Heart failure Mother    • Heart disease Father    • Heart failure Father      Social History     Tobacco Use   • Smoking status: Former Smoker   • Smokeless tobacco: Never Used   Substance Use Topics   • Alcohol use: No         PHYSICAL EXAM:    /78 (BP Location: Right arm, Patient Position: Sitting)   Pulse 92   Ht 170.2 cm (67\")   Wt 83.5 kg (184 lb)   SpO2 98%   BMI 28.82 kg/m²        Wt Readings from Last 5 Encounters:   06/26/20 83.5 kg (184 lb)   05/28/20 83.5 kg (184 lb)   05/04/20 83 kg (183 lb)   04/16/20 83.9 kg (185 lb)   04/02/20 89.9 kg (198 lb 3.1 oz)       BP Readings from Last 5 Encounters:   06/26/20 112/78   05/28/20 123/86   05/04/20 149/91   04/16/20 139/100   04/03/20 134/90       General-Well Nourished, Well developed  Eyes - PERRLA  Neck- supple, No " mass  CV- regular rate and rhythm, no MRG, No edema  Lung- clear bilaterally  Abd- soft, +BS  Musc/skel - Norm strength and range of motion  Skin- warm and dry  Neuro - Alert & Oriented x 3, appropriate mood.        Medical problems and test results were reviewed with the patient today.     No results found for this or any previous visit (from the past 672 hour(s)).      EKG: (EKG has been independently visualized by me and summarized below)    Procedures     ASSESSMENT and PLAN  1.  Bradycardia - resolved post pacemaker - Adjusted Rate response  2.  Pacemaker-stable function - Some noise on the atrial lead. Change DDIR mode. Poor HR response, will adjust rate response.   3.  HTN - Now with periods of hypotension - Decreased metoprolol XL to 25  4.  CAD - Post PTCI        Return in about 1 year (around 6/26/2021) for office visit and device check with [place].        Gurdeep Borja M.D., F.A.C.C, F.H.R.S.  Cardiology/Electrophysiology  6/26/2020  13:40

## 2020-08-27 ENCOUNTER — OFFICE VISIT (OUTPATIENT)
Dept: CARDIOLOGY | Facility: CLINIC | Age: 60
End: 2020-08-27

## 2020-08-27 VITALS
DIASTOLIC BLOOD PRESSURE: 79 MMHG | BODY MASS INDEX: 30.02 KG/M2 | TEMPERATURE: 97 F | SYSTOLIC BLOOD PRESSURE: 117 MMHG | HEART RATE: 73 BPM | HEIGHT: 66 IN | WEIGHT: 186.8 LBS | RESPIRATION RATE: 16 BRPM

## 2020-08-27 DIAGNOSIS — R07.2 PRECORDIAL CHEST PAIN: ICD-10-CM

## 2020-08-27 DIAGNOSIS — G47.33 OSA ON CPAP: Chronic | ICD-10-CM

## 2020-08-27 DIAGNOSIS — E78.2 MIXED HYPERLIPIDEMIA: ICD-10-CM

## 2020-08-27 DIAGNOSIS — Z95.0 PRESENCE OF CARDIAC PACEMAKER: ICD-10-CM

## 2020-08-27 DIAGNOSIS — I10 ESSENTIAL HYPERTENSION: Primary | ICD-10-CM

## 2020-08-27 DIAGNOSIS — Z99.89 OSA ON CPAP: Chronic | ICD-10-CM

## 2020-08-27 DIAGNOSIS — I48.0 PAROXYSMAL ATRIAL FIBRILLATION (HCC): ICD-10-CM

## 2020-08-27 PROCEDURE — 99213 OFFICE O/P EST LOW 20 MIN: CPT | Performed by: SPECIALIST

## 2020-08-27 RX ORDER — POTASSIUM CHLORIDE 750 MG/1
10 TABLET, EXTENDED RELEASE ORAL DAILY
Qty: 90 TABLET | Refills: 1 | Status: SHIPPED | OUTPATIENT
Start: 2020-08-27 | End: 2021-03-02 | Stop reason: SDUPTHER

## 2020-08-27 RX ORDER — ROSUVASTATIN CALCIUM 20 MG/1
20 TABLET, COATED ORAL DAILY
COMMUNITY
End: 2020-08-27

## 2020-08-27 RX ORDER — NITROGLYCERIN 0.4 MG/1
0.4 TABLET SUBLINGUAL
Qty: 90 TABLET | Refills: 1 | Status: SHIPPED | OUTPATIENT
Start: 2020-08-27 | End: 2021-03-02 | Stop reason: SDUPTHER

## 2020-08-27 RX ORDER — TIZANIDINE 4 MG/1
4 TABLET ORAL NIGHTLY PRN
COMMUNITY

## 2020-08-27 RX ORDER — METOPROLOL SUCCINATE 50 MG/1
50 TABLET, EXTENDED RELEASE ORAL DAILY
Qty: 90 TABLET | Refills: 1 | Status: SHIPPED | OUTPATIENT
Start: 2020-08-27 | End: 2021-03-02 | Stop reason: SDUPTHER

## 2020-08-27 RX ORDER — CARVEDILOL 6.25 MG/1
12.5 TABLET ORAL 2 TIMES DAILY WITH MEALS
COMMUNITY
End: 2020-08-27 | Stop reason: SDUPTHER

## 2020-08-27 RX ORDER — ISOSORBIDE MONONITRATE 30 MG/1
30 TABLET, EXTENDED RELEASE ORAL DAILY
Qty: 90 TABLET | Refills: 1 | Status: SHIPPED | OUTPATIENT
Start: 2020-08-27 | End: 2021-03-02 | Stop reason: SDUPTHER

## 2020-08-27 RX ORDER — FUROSEMIDE 20 MG/1
20 TABLET ORAL DAILY
Qty: 90 TABLET | Refills: 1 | Status: SHIPPED | OUTPATIENT
Start: 2020-08-27 | End: 2021-03-02 | Stop reason: SDUPTHER

## 2020-08-27 RX ORDER — ROSUVASTATIN CALCIUM 20 MG/1
20 TABLET, COATED ORAL DAILY
Qty: 90 TABLET | Refills: 3 | Status: SHIPPED | OUTPATIENT
Start: 2020-08-27 | End: 2021-03-02 | Stop reason: SDUPTHER

## 2020-08-27 NOTE — PROGRESS NOTES
Subjective   Follow up, paroxysmal atrial fibrillation  Lian Kang is a 60 y.o. female who presents to day for Atrial Fibrillation (3 mos med therapy follow); Edema (LE); and Med Management (list provided with verbal revisions).    CHIEF COMPLIANT  Chief Complaint   Patient presents with   • Atrial Fibrillation     3 mos med therapy follow   • Edema     LE   • Med Management     list provided with verbal revisions       Active Problems:  Problem List Items Addressed This Visit        Cardiovascular and Mediastinum    Presence of cardiac pacemaker    Essential hypertension - Primary    Relevant Medications    carvedilol (COREG) 6.25 MG tablet    Mixed hyperlipidemia    Relevant Medications    rosuvastatin (CRESTOR) 20 MG tablet    Paroxysmal atrial fibrillation (CMS/HCC)    Relevant Medications    carvedilol (COREG) 6.25 MG tablet       Respiratory    AMANDEEP on CPAP (Chronic)          HPI  HPI  She has been doing well she has no palpitations no chest pain she said there is no significant shortness of breath does have some intermittent lower extremity edema  PRIOR MEDS  Current Outpatient Medications on File Prior to Visit   Medication Sig Dispense Refill   • bromocriptine (PARLODEL) 2.5 MG tablet Take 2.5 mg by mouth Daily.     • busPIRone (BUSPAR) 10 MG tablet Take 10 mg by mouth 3 (Three) Times a Day As Needed (anxiety).     • carvedilol (COREG) 6.25 MG tablet Take 12.5 mg by mouth 2 (Two) Times a Day With Meals.     • cetirizine (zyrTEC) 10 MG tablet Take 10 mg by mouth Daily.     • clonazePAM (KlonoPIN) 0.5 MG tablet Take 0.25 mg by mouth 3 (Three) Times a Day As Needed for Anxiety.     • docusate sodium (COLACE) 100 MG capsule Take 100 mg by mouth 2 (Two) Times a Day As Needed for Constipation.     • escitalopram (LEXAPRO) 10 MG tablet Take 10 mg by mouth Daily.     • famotidine (PEPCID) 20 MG tablet Daily.     • fluticasone (FLONASE) 50 MCG/ACT nasal spray 2 sprays into the nostril(s) as directed by provider  Daily.     • furosemide (LASIX) 20 MG tablet Take 1 tablet by mouth Daily. 90 tablet 1   • gabapentin (NEURONTIN) 300 MG capsule Take 300 mg by mouth 3 (Three) Times a Day.     • HYDROcodone-acetaminophen (NORCO) 5-325 MG per tablet Take 1 tablet by mouth Every 6 (Six) Hours As Needed for Moderate Pain .     • levalbuterol (XOPENEX HFA) 45 MCG/ACT inhaler Inhale 1-2 puffs Every 4 (Four) Hours As Needed for Wheezing.     • magnesium oxide (MAGOX) 400 (241.3 Mg) MG tablet tablet Take 400 mg by mouth 2 (Two) Times a Day.     • metFORMIN (GLUCOPHAGE) 500 MG tablet Take 500 mg by mouth Daily With Breakfast.     • methocarbamol (ROBAXIN) 500 MG tablet Take 500 mg by mouth 3 (Three) Times a Day As Needed for Muscle Spasms.     • nitroglycerin (NITROSTAT) 0.4 MG SL tablet Place 1 tablet under the tongue Every 5 (Five) Minutes As Needed for Chest Pain. Take no more than 3 doses in 15 minutes. 90 tablet 1   • pantoprazole (PROTONIX) 40 MG EC tablet Take 40 mg by mouth Daily.     • polyethylene glycol (MIRALAX) packet Take 17 g by mouth Daily.     • potassium chloride (K-DUR,KLOR-CON) 10 MEQ CR tablet Take 10 mEq by mouth 2 (Two) Times a Day.     • promethazine (PHENERGAN) 25 MG tablet Take 25 mg by mouth 3 (Three) Times a Day As Needed for Nausea or Vomiting.     • rivaroxaban (Xarelto) 20 MG tablet Take 1 tablet by mouth Daily. 90 tablet 1   • rosuvastatin (CRESTOR) 20 MG tablet Take 20 mg by mouth Daily.     • sertraline (ZOLOFT) 100 MG tablet Take 1 tablet by mouth Daily. (Patient taking differently: Take 150 mg by mouth Daily. Take 1 & 1/2 tab of 100mg per dose) 90 tablet 1   • tiZANidine (ZANAFLEX) 4 MG tablet Take 4 mg by mouth At Night As Needed for Muscle Spasms.     • topiramate (TOPAMAX) 100 MG tablet Take 100 mg by mouth Daily.     • traZODone (DESYREL) 50 MG tablet Take 50 mg by mouth 2 (Two) Times a Day.     • Umeclidinium Bromide (INCRUSE ELLIPTA) 62.5 MCG/INH aerosol powder  Inhale 1 puff Daily.     • albuterol  sulfate  (90 Base) MCG/ACT inhaler Inhale 2 puffs 4 (Four) Times a Day As Needed for Wheezing.     • isosorbide mononitrate (IMDUR) 30 MG 24 hr tablet Take 1 tablet by mouth Daily. 90 tablet 1   • metoprolol succinate XL (TOPROL-XL) 50 MG 24 hr tablet Daily.     • pravastatin (PRAVACHOL) 40 MG tablet Daily.     • raNITIdine (ZANTAC) 150 MG tablet Take 150 mg by mouth Every Evening.       No current facility-administered medications on file prior to visit.        ALLERGIES  Patient has no known allergies.    HISTORY  Past Medical History:   Diagnosis Date   • Anxiety and depression    • Atypical chest pain    • Diabetes mellitus (CMS/HCC)    • Dyslipidemia    • GERD (gastroesophageal reflux disease)    • H/O valvular heart disease     Echocardiogram 2013 revealing EF 60%. Mild left atrial enlargement, trace AI, trace MR, mild TR.   • Hypertension    • Insomnia    • Migraine headache     Chronic migraine headaches/Topamax therapy.    • Moderate obesity    • AMANDEEP on CPAP    • Pituitary adenoma (CMS/HCC)     History of pituitary adenoma/bromocriptine therapy.    • Sinus bradycardia     Severe symptomatic bradycardia secondary to sinus node dysfunction and sinus bradycardia with pacemaker at elective replacement indicator.   • SVT (supraventricular tachycardia) (CMS/[facility])     With prior ablation.       Social History     Socioeconomic History   • Marital status:      Spouse name: Not on file   • Number of children: Not on file   • Years of education: Not on file   • Highest education level: Not on file   Tobacco Use   • Smoking status: Former Smoker   • Smokeless tobacco: Never Used   Substance and Sexual Activity   • Alcohol use: No   • Drug use: No   • Sexual activity: Defer       Family History   Problem Relation Age of Onset   • Heart disease Mother    • Heart failure Mother    • Heart disease Father    • Heart failure Father        Review of Systems   Constitutional: Negative for activity change and  "appetite change.   HENT: Negative for congestion.    Eyes: Negative for discharge.   Respiratory: Negative for apnea, cough, choking, chest tightness, shortness of breath, wheezing and stridor.    Cardiovascular: Positive for leg swelling. Negative for chest pain and palpitations.   Gastrointestinal: Positive for abdominal pain. Negative for abdominal distention.   Endocrine: Negative for cold intolerance and heat intolerance.   Musculoskeletal: Negative for neck stiffness.   Skin: Negative for color change and pallor.   Allergic/Immunologic: Negative for immunocompromised state.   Neurological: Negative for dizziness.   Hematological: Does not bruise/bleed easily.   Psychiatric/Behavioral: Negative for agitation and behavioral problems.       Objective     VITALS: /79   Pulse 73   Temp 97 °F (36.1 °C)   Resp 16   Ht 167.6 cm (66\")   Wt 84.7 kg (186 lb 12.8 oz)   BMI 30.15 kg/m²     LABS:   Lab Results (most recent)     None          IMAGING:   No Images in the past 120 days found..    EXAM:  Physical Exam   Constitutional: She is oriented to person, place, and time. She appears well-developed and well-nourished.   HENT:   Head: Normocephalic and atraumatic.   Eyes: Pupils are equal, round, and reactive to light.   Neck: Neck supple. No JVD present. No thyromegaly present.   Cardiovascular: Normal rate, regular rhythm, normal heart sounds and intact distal pulses. Exam reveals no gallop and no friction rub.   No murmur heard.  Pacer pocket clean and nontender   Pulmonary/Chest: Effort normal and breath sounds normal. No stridor. No respiratory distress. She has no wheezes. She has no rales. She exhibits no tenderness.   Musculoskeletal: She exhibits no edema, tenderness or deformity.   Neurological: She is alert and oriented to person, place, and time. No cranial nerve deficit. Coordination normal.   Skin: Skin is warm and dry.   Psychiatric: She has a normal mood and affect.   Vitals " reviewed.      Procedure   Procedures       Assessment/Plan    Diagnosis Plan   1. Essential hypertension     2. Mixed hyperlipidemia     3. Paroxysmal atrial fibrillation (CMS/HCC)     4. AMANDEEP on CPAP     5. Presence of cardiac pacemaker     1. Blood pressure is well controlled, will continue current medication  2. Will get lipid profile prior to next visit  3. Pacer interrogated by EP, with noise notice, was programmed to DDIR  4. Using CPAP intermittently, advised to use it every night, Dr Mccloud is monitoring    No follow-ups on file.    Lian was seen today for atrial fibrillation, edema and med management.    Diagnoses and all orders for this visit:    Essential hypertension    Mixed hyperlipidemia    Paroxysmal atrial fibrillation (CMS/HCC)    AMANDEEP on CPAP    Presence of cardiac pacemaker                 MEDS ORDERED DURING VISIT:  No orders of the defined types were placed in this encounter.        This document has been electronically signed by Anthony Faustin MD  August 27, 2020 09:41

## 2021-03-02 ENCOUNTER — OFFICE VISIT (OUTPATIENT)
Dept: CARDIOLOGY | Facility: CLINIC | Age: 61
End: 2021-03-02

## 2021-03-02 VITALS
DIASTOLIC BLOOD PRESSURE: 90 MMHG | OXYGEN SATURATION: 99 % | TEMPERATURE: 97.8 F | WEIGHT: 194 LBS | HEART RATE: 95 BPM | HEIGHT: 66 IN | RESPIRATION RATE: 16 BRPM | SYSTOLIC BLOOD PRESSURE: 125 MMHG | BODY MASS INDEX: 31.18 KG/M2

## 2021-03-02 DIAGNOSIS — G47.33 OSA ON CPAP: Chronic | ICD-10-CM

## 2021-03-02 DIAGNOSIS — Z95.0 PRESENCE OF BIVENTRICULAR CARDIAC PACEMAKER: ICD-10-CM

## 2021-03-02 DIAGNOSIS — Z99.89 OSA ON CPAP: Chronic | ICD-10-CM

## 2021-03-02 DIAGNOSIS — R07.2 PRECORDIAL CHEST PAIN: ICD-10-CM

## 2021-03-02 DIAGNOSIS — R06.02 SHORTNESS OF BREATH: ICD-10-CM

## 2021-03-02 DIAGNOSIS — I10 ESSENTIAL HYPERTENSION: Primary | ICD-10-CM

## 2021-03-02 DIAGNOSIS — I48.0 PAROXYSMAL ATRIAL FIBRILLATION (HCC): ICD-10-CM

## 2021-03-02 DIAGNOSIS — E78.2 MIXED HYPERLIPIDEMIA: ICD-10-CM

## 2021-03-02 PROCEDURE — 93000 ELECTROCARDIOGRAM COMPLETE: CPT | Performed by: SPECIALIST

## 2021-03-02 PROCEDURE — 99214 OFFICE O/P EST MOD 30 MIN: CPT | Performed by: SPECIALIST

## 2021-03-02 RX ORDER — NITROGLYCERIN 0.4 MG/1
0.4 TABLET SUBLINGUAL
Qty: 90 TABLET | Refills: 1 | Status: SHIPPED | OUTPATIENT
Start: 2021-03-02 | End: 2022-05-03 | Stop reason: SDUPTHER

## 2021-03-02 RX ORDER — POTASSIUM CHLORIDE 750 MG/1
10 TABLET, EXTENDED RELEASE ORAL DAILY
Qty: 90 TABLET | Refills: 1 | Status: SHIPPED | OUTPATIENT
Start: 2021-03-02 | End: 2022-05-03 | Stop reason: SDUPTHER

## 2021-03-02 RX ORDER — ROSUVASTATIN CALCIUM 20 MG/1
20 TABLET, COATED ORAL DAILY
Qty: 90 TABLET | Refills: 3 | Status: SHIPPED | OUTPATIENT
Start: 2021-03-02 | End: 2022-03-24

## 2021-03-02 RX ORDER — METOPROLOL SUCCINATE 50 MG/1
50 TABLET, EXTENDED RELEASE ORAL DAILY
Qty: 90 TABLET | Refills: 1 | Status: SHIPPED | OUTPATIENT
Start: 2021-03-02 | End: 2022-05-03 | Stop reason: SDUPTHER

## 2021-03-02 RX ORDER — ISOSORBIDE MONONITRATE 30 MG/1
30 TABLET, EXTENDED RELEASE ORAL DAILY
Qty: 90 TABLET | Refills: 1 | Status: SHIPPED | OUTPATIENT
Start: 2021-03-02 | End: 2023-03-07 | Stop reason: SDUPTHER

## 2021-03-02 RX ORDER — FUROSEMIDE 20 MG/1
20 TABLET ORAL DAILY
Qty: 90 TABLET | Refills: 1 | Status: SHIPPED | OUTPATIENT
Start: 2021-03-02 | End: 2022-05-03 | Stop reason: SDUPTHER

## 2021-03-02 NOTE — PROGRESS NOTES
Subjective   Follow up, hypertension, paroxysmal atrial fibrillation  Lian Kang is a 60 y.o. female who presents to day for Follow-up (6 month) and Med Management (verbal).    CHIEF COMPLIANT  Chief Complaint   Patient presents with   • Follow-up     6 month   • Med Management     verbal       Active Problems:  Problem List Items Addressed This Visit        Cardiac and Vasculature    Presence of biventricular cardiac pacemaker    Essential hypertension - Primary    Relevant Medications    furosemide (LASIX) 20 MG tablet    isosorbide mononitrate (IMDUR) 30 MG 24 hr tablet    metoprolol succinate XL (TOPROL-XL) 50 MG 24 hr tablet    potassium chloride (K-DUR,KLOR-CON) 10 MEQ CR tablet    Mixed hyperlipidemia    Relevant Medications    rosuvastatin (CRESTOR) 20 MG tablet    Other Relevant Orders    Lipid Panel    Comprehensive Metabolic Panel    Paroxysmal atrial fibrillation (CMS/HCC)    Relevant Medications    isosorbide mononitrate (IMDUR) 30 MG 24 hr tablet    metoprolol succinate XL (TOPROL-XL) 50 MG 24 hr tablet    nitroglycerin (NITROSTAT) 0.4 MG SL tablet    rivaroxaban (Xarelto) 20 MG tablet    Other Relevant Orders    CBC (No Diff)    Precordial chest pain    Relevant Medications    nitroglycerin (NITROSTAT) 0.4 MG SL tablet       Pulmonary and Pneumonias    Shortness of breath       Sleep    AMANDEEP on CPAP (Chronic)          HPI  HPI  He had COVID-19 10 February and still feels very short of breath even doing her regular housework she has been hurting all over her chest and her lungs the back, feeling very anorexic still has not regained her taste still using oxygen at night at home also having occasional palpitations has been happening relatively frequently recently so she was found to have a nodule in her lungs and Dr. Mejia is doing CT scan of the chest for assessment she is to CPAP every night with oxygen no smoking but she is exposed to secondhand smoking  PRIOR MEDS  Current Outpatient  Medications on File Prior to Visit   Medication Sig Dispense Refill   • albuterol sulfate  (90 Base) MCG/ACT inhaler Inhale 2 puffs 4 (Four) Times a Day As Needed for Wheezing.     • bromocriptine (PARLODEL) 2.5 MG tablet Take 2.5 mg by mouth Daily.     • busPIRone (BUSPAR) 10 MG tablet Take 10 mg by mouth 3 (Three) Times a Day As Needed (anxiety).     • cetirizine (zyrTEC) 10 MG tablet Take 10 mg by mouth Daily.     • clonazePAM (KlonoPIN) 0.5 MG tablet Take 0.25 mg by mouth 3 (Three) Times a Day As Needed for Anxiety.     • docusate sodium (COLACE) 100 MG capsule Take 100 mg by mouth 2 (Two) Times a Day As Needed for Constipation.     • escitalopram (LEXAPRO) 10 MG tablet Take 10 mg by mouth Daily.     • famotidine (PEPCID) 20 MG tablet Daily.     • fluticasone (FLONASE) 50 MCG/ACT nasal spray 2 sprays into the nostril(s) as directed by provider Daily.     • gabapentin (NEURONTIN) 300 MG capsule Take 300 mg by mouth 3 (Three) Times a Day.     • HYDROcodone-acetaminophen (NORCO) 5-325 MG per tablet Take 1 tablet by mouth Every 6 (Six) Hours As Needed for Moderate Pain .     • levalbuterol (XOPENEX HFA) 45 MCG/ACT inhaler Inhale 1-2 puffs Every 4 (Four) Hours As Needed for Wheezing.     • magnesium oxide (MAGOX) 400 (241.3 Mg) MG tablet tablet Take 400 mg by mouth 2 (Two) Times a Day.     • metFORMIN (GLUCOPHAGE) 500 MG tablet Take 500 mg by mouth Daily With Breakfast.     • methocarbamol (ROBAXIN) 500 MG tablet Take 500 mg by mouth 3 (Three) Times a Day As Needed for Muscle Spasms.     • pantoprazole (PROTONIX) 40 MG EC tablet Take 40 mg by mouth Daily.     • polyethylene glycol (MIRALAX) packet Take 17 g by mouth Daily.     • promethazine (PHENERGAN) 25 MG tablet Take 25 mg by mouth 3 (Three) Times a Day As Needed for Nausea or Vomiting.     • raNITIdine (ZANTAC) 150 MG tablet Take 150 mg by mouth Every Evening.     • sertraline (ZOLOFT) 100 MG tablet Take 1 tablet by mouth Daily. (Patient taking  differently: Take 150 mg by mouth Daily. Take 1 & 1/2 tab of 100mg per dose) 90 tablet 1   • tiZANidine (ZANAFLEX) 4 MG tablet Take 4 mg by mouth At Night As Needed for Muscle Spasms.     • topiramate (TOPAMAX) 100 MG tablet Take 100 mg by mouth Daily.     • traZODone (DESYREL) 50 MG tablet Take 50 mg by mouth 2 (Two) Times a Day.     • Umeclidinium Bromide (INCRUSE ELLIPTA) 62.5 MCG/INH aerosol powder  Inhale 1 puff Daily.     • [DISCONTINUED] furosemide (LASIX) 20 MG tablet Take 1 tablet by mouth Daily. 90 tablet 1   • [DISCONTINUED] isosorbide mononitrate (IMDUR) 30 MG 24 hr tablet Take 1 tablet by mouth Daily. 90 tablet 1   • [DISCONTINUED] metoprolol succinate XL (TOPROL-XL) 50 MG 24 hr tablet Take 1 tablet by mouth Daily. 90 tablet 1   • [DISCONTINUED] nitroglycerin (NITROSTAT) 0.4 MG SL tablet Place 1 tablet under the tongue Every 5 (Five) Minutes As Needed for Chest Pain. Take no more than 3 doses in 15 minutes. 90 tablet 1   • [DISCONTINUED] potassium chloride (K-DUR,KLOR-CON) 10 MEQ CR tablet Take 1 tablet by mouth Daily. 90 tablet 1   • [DISCONTINUED] rivaroxaban (Xarelto) 20 MG tablet Take 1 tablet by mouth Daily. 90 tablet 1   • [DISCONTINUED] rosuvastatin (CRESTOR) 20 MG tablet Take 1 tablet by mouth Daily. 90 tablet 3     No current facility-administered medications on file prior to visit.        ALLERGIES  Patient has no known allergies.    HISTORY  Past Medical History:   Diagnosis Date   • Anxiety and depression    • Atypical chest pain    • Diabetes mellitus (CMS/HCC)    • Dyslipidemia    • GERD (gastroesophageal reflux disease)    • H/O valvular heart disease     Echocardiogram 2013 revealing EF 60%. Mild left atrial enlargement, trace AI, trace MR, mild TR.   • Hypertension    • Insomnia    • Migraine headache     Chronic migraine headaches/Topamax therapy.    • Moderate obesity    • AMANDEEP on CPAP    • Pituitary adenoma (CMS/HCC)     History of pituitary adenoma/bromocriptine therapy.    • Sinus  "bradycardia     Severe symptomatic bradycardia secondary to sinus node dysfunction and sinus bradycardia with pacemaker at elective replacement indicator.   • SVT (supraventricular tachycardia) (CMS/HCC)     With prior ablation.       Social History     Socioeconomic History   • Marital status:      Spouse name: Not on file   • Number of children: Not on file   • Years of education: Not on file   • Highest education level: Not on file   Tobacco Use   • Smoking status: Former Smoker   • Smokeless tobacco: Never Used   Substance and Sexual Activity   • Alcohol use: No   • Drug use: No   • Sexual activity: Defer       Family History   Problem Relation Age of Onset   • Heart disease Mother    • Heart failure Mother    • Heart disease Father    • Heart failure Father        Review of Systems   Constitutional: Positive for activity change. Negative for appetite change.   HENT: Negative for congestion.    Respiratory: Positive for cough, chest tightness and shortness of breath. Negative for apnea, choking, wheezing and stridor.    Cardiovascular: Positive for chest pain and palpitations. Negative for leg swelling.   Gastrointestinal: Positive for abdominal pain. Negative for abdominal distention.   Endocrine: Negative for cold intolerance and heat intolerance.   Genitourinary: Positive for flank pain.   Musculoskeletal: Negative for gait problem.   Skin: Negative for color change and pallor.   Neurological: Positive for dizziness.   Psychiatric/Behavioral: Negative for agitation and behavioral problems.       Objective     VITALS: /90 (BP Location: Left arm, Patient Position: Sitting, Cuff Size: Large Adult)   Pulse 95   Temp 97.8 °F (36.6 °C) (Temporal)   Resp 16   Ht 167.6 cm (65.98\")   Wt 88 kg (194 lb)   SpO2 99%   BMI 31.33 kg/m²     LABS:   Lab Results (most recent)     None          IMAGING:   No Images in the past 120 days found..    EXAM:  Physical Exam  Vitals signs reviewed.   Constitutional: "       Appearance: She is well-developed.   HENT:      Head: Normocephalic and atraumatic.   Eyes:      Pupils: Pupils are equal, round, and reactive to light.   Neck:      Musculoskeletal: Neck supple.      Thyroid: No thyromegaly.      Vascular: No JVD.   Cardiovascular:      Rate and Rhythm: Normal rate and regular rhythm.      Heart sounds: Normal heart sounds. No murmur. No friction rub. No gallop.       Comments: Pacer pocket clean and nontender  Pulmonary:      Effort: Pulmonary effort is normal. No respiratory distress.      Breath sounds: Normal breath sounds. No stridor. No wheezing or rales.   Chest:      Chest wall: No tenderness.   Musculoskeletal:         General: No tenderness or deformity.   Skin:     General: Skin is warm and dry.   Neurological:      Mental Status: She is alert and oriented to person, place, and time.      Cranial Nerves: No cranial nerve deficit.      Coordination: Coordination normal.         Procedure     ECG 12 Lead    Date/Time: 3/2/2021 10:55 AM  Performed by: Anthony Faustin MD  Authorized by: Anthony Faustin MD           EKG: Paced atrial responses, with diffuse nonspecific ST-T changes, and with normal response to Magnet, compare with EKG on 4/3/2020 no ventricular pacing today       Assessment/Plan    Diagnosis Plan   1. Essential hypertension  furosemide (LASIX) 20 MG tablet    isosorbide mononitrate (IMDUR) 30 MG 24 hr tablet    metoprolol succinate XL (TOPROL-XL) 50 MG 24 hr tablet    potassium chloride (K-DUR,KLOR-CON) 10 MEQ CR tablet   2. Mixed hyperlipidemia  rosuvastatin (CRESTOR) 20 MG tablet    Lipid Panel    Comprehensive Metabolic Panel   3. Paroxysmal atrial fibrillation (CMS/HCC)  rivaroxaban (Xarelto) 20 MG tablet    CBC (No Diff)   4. AMANDEEP on CPAP     5. Shortness of breath     6. Presence of biventricular cardiac pacemaker     7. Precordial chest pain  nitroglycerin (NITROSTAT) 0.4 MG SL tablet   1. Blood pressure is mildly elevated, only diastolic, she is  under a lot of stress today, so, will monitor for now  2. Will interrogate pacer in July, interrogation in January was normal funciton  3. In sinus rhythm, continue current therapy  4. I reviewed labs from September, with elevated glucose and LDL, she has been taking pravastatin not rosuvastatin, will switch and repeat labs prior to next visit  5. Advised to lose weight    Return in about 3 months (around 6/2/2021).    Diagnoses and all orders for this visit:    1. Essential hypertension (Primary)  -     furosemide (LASIX) 20 MG tablet; Take 1 tablet by mouth Daily.  Dispense: 90 tablet; Refill: 1  -     isosorbide mononitrate (IMDUR) 30 MG 24 hr tablet; Take 1 tablet by mouth Daily.  Dispense: 90 tablet; Refill: 1  -     metoprolol succinate XL (TOPROL-XL) 50 MG 24 hr tablet; Take 1 tablet by mouth Daily.  Dispense: 90 tablet; Refill: 1  -     potassium chloride (K-DUR,KLOR-CON) 10 MEQ CR tablet; Take 1 tablet by mouth Daily.  Dispense: 90 tablet; Refill: 1    2. Mixed hyperlipidemia  -     rosuvastatin (CRESTOR) 20 MG tablet; Take 1 tablet by mouth Daily.  Dispense: 90 tablet; Refill: 3  -     Lipid Panel; Future  -     Comprehensive Metabolic Panel; Future    3. Paroxysmal atrial fibrillation (CMS/HCC)  -     rivaroxaban (Xarelto) 20 MG tablet; Take 1 tablet by mouth Daily.  Dispense: 90 tablet; Refill: 1  -     CBC (No Diff); Future    4. AMANDEEP on CPAP    5. Shortness of breath    6. Presence of biventricular cardiac pacemaker    7. Precordial chest pain  -     nitroglycerin (NITROSTAT) 0.4 MG SL tablet; Place 1 tablet under the tongue Every 5 (Five) Minutes As Needed for Chest Pain. Take no more than 3 doses in 15 minutes.  Dispense: 90 tablet; Refill: 1    Other orders  -     ECG 12 Lead                          MEDS ORDERED DURING VISIT:  New Medications Ordered This Visit   Medications   • furosemide (LASIX) 20 MG tablet     Sig: Take 1 tablet by mouth Daily.     Dispense:  90 tablet     Refill:  1   •  isosorbide mononitrate (IMDUR) 30 MG 24 hr tablet     Sig: Take 1 tablet by mouth Daily.     Dispense:  90 tablet     Refill:  1   • metoprolol succinate XL (TOPROL-XL) 50 MG 24 hr tablet     Sig: Take 1 tablet by mouth Daily.     Dispense:  90 tablet     Refill:  1   • nitroglycerin (NITROSTAT) 0.4 MG SL tablet     Sig: Place 1 tablet under the tongue Every 5 (Five) Minutes As Needed for Chest Pain. Take no more than 3 doses in 15 minutes.     Dispense:  90 tablet     Refill:  1   • potassium chloride (K-DUR,KLOR-CON) 10 MEQ CR tablet     Sig: Take 1 tablet by mouth Daily.     Dispense:  90 tablet     Refill:  1   • rivaroxaban (Xarelto) 20 MG tablet     Sig: Take 1 tablet by mouth Daily.     Dispense:  90 tablet     Refill:  1   • rosuvastatin (CRESTOR) 20 MG tablet     Sig: Take 1 tablet by mouth Daily.     Dispense:  90 tablet     Refill:  3         This document has been electronically signed by Anthony Faustin MD  March 2, 2021 12:01 EST

## 2021-04-25 PROCEDURE — 93296 REM INTERROG EVL PM/IDS: CPT | Performed by: INTERNAL MEDICINE

## 2021-04-25 PROCEDURE — 93294 REM INTERROG EVL PM/LDLS PM: CPT | Performed by: INTERNAL MEDICINE

## 2021-07-25 PROCEDURE — 93296 REM INTERROG EVL PM/IDS: CPT | Performed by: INTERNAL MEDICINE

## 2021-07-25 PROCEDURE — 93294 REM INTERROG EVL PM/LDLS PM: CPT | Performed by: INTERNAL MEDICINE

## 2021-09-27 ENCOUNTER — OFFICE VISIT (OUTPATIENT)
Dept: CARDIOLOGY | Facility: CLINIC | Age: 61
End: 2021-09-27

## 2021-09-27 VITALS
HEIGHT: 65 IN | DIASTOLIC BLOOD PRESSURE: 90 MMHG | HEART RATE: 82 BPM | TEMPERATURE: 96 F | OXYGEN SATURATION: 98 % | BODY MASS INDEX: 32.32 KG/M2 | WEIGHT: 194 LBS | SYSTOLIC BLOOD PRESSURE: 130 MMHG

## 2021-09-27 DIAGNOSIS — I48.0 PAROXYSMAL ATRIAL FIBRILLATION (HCC): ICD-10-CM

## 2021-09-27 DIAGNOSIS — Z95.0 PRESENCE OF CARDIAC PACEMAKER: ICD-10-CM

## 2021-09-27 DIAGNOSIS — Z99.89 OSA ON CPAP: Chronic | ICD-10-CM

## 2021-09-27 DIAGNOSIS — E78.2 MIXED HYPERLIPIDEMIA: ICD-10-CM

## 2021-09-27 DIAGNOSIS — I10 ESSENTIAL HYPERTENSION: Primary | ICD-10-CM

## 2021-09-27 DIAGNOSIS — R06.02 SHORTNESS OF BREATH: ICD-10-CM

## 2021-09-27 DIAGNOSIS — R07.2 PRECORDIAL CHEST PAIN: ICD-10-CM

## 2021-09-27 DIAGNOSIS — G47.33 OSA ON CPAP: Chronic | ICD-10-CM

## 2021-09-27 PROCEDURE — 93000 ELECTROCARDIOGRAM COMPLETE: CPT | Performed by: SPECIALIST

## 2021-09-27 PROCEDURE — 99214 OFFICE O/P EST MOD 30 MIN: CPT | Performed by: SPECIALIST

## 2021-09-27 NOTE — PROGRESS NOTES
Subjective   Follow up, paroxysmal atrial fibrillation  Lian Kang is a 61 y.o. female who presents to day for Med Management (verbal. ), Chest Pain, Shortness of Breath, Edema, Dizziness, and Palpitations.    CHIEF COMPLIANT  Chief Complaint   Patient presents with   • Med Management     verbal.    • Chest Pain   • Shortness of Breath   • Edema   • Dizziness   • Palpitations       Active Problems:  Problem List Items Addressed This Visit        Cardiac and Vasculature    Presence of cardiac pacemaker    Essential hypertension - Primary    Mixed hyperlipidemia    Relevant Orders    Comprehensive Metabolic Panel    Lipid Panel    Paroxysmal atrial fibrillation (CMS/HCC)    Relevant Orders    CBC (No Diff)    Precordial chest pain       Pulmonary and Pneumonias    Shortness of breath       Sleep    AMANDEEP on CPAP (Chronic)          HPI  HPI  No significant change she still having shortness of breath on exertion class II especially if she walks uphill and intermittent chest pains on and off is usually around the area where she has a pacemaker and it can happen also after she does a lot of work like housework there was some edema but her primary care physician has increased the water pill and this is better and also she gets palpitation when she is stressed out  PRIOR MEDS  Current Outpatient Medications on File Prior to Visit   Medication Sig Dispense Refill   • albuterol sulfate  (90 Base) MCG/ACT inhaler Inhale 2 puffs 4 (Four) Times a Day As Needed for Wheezing.     • bromocriptine (PARLODEL) 2.5 MG tablet Take 2.5 mg by mouth Daily.     • busPIRone (BUSPAR) 10 MG tablet Take 10 mg by mouth 3 (Three) Times a Day As Needed (anxiety).     • cetirizine (zyrTEC) 10 MG tablet Take 10 mg by mouth Daily.     • clonazePAM (KlonoPIN) 0.5 MG tablet Take 0.25 mg by mouth 3 (Three) Times a Day As Needed for Anxiety.     • docusate sodium (COLACE) 100 MG capsule Take 100 mg by mouth 2 (Two) Times a Day As Needed for  Constipation.     • escitalopram (LEXAPRO) 10 MG tablet Take 10 mg by mouth Daily.     • famotidine (PEPCID) 20 MG tablet Daily.     • fluticasone (FLONASE) 50 MCG/ACT nasal spray 2 sprays into the nostril(s) as directed by provider Daily.     • furosemide (LASIX) 20 MG tablet Take 1 tablet by mouth Daily. (Patient taking differently: Take 20 mg by mouth 2 (Two) Times a Day.) 90 tablet 1   • gabapentin (NEURONTIN) 300 MG capsule Take 300 mg by mouth 3 (Three) Times a Day.     • HYDROcodone-acetaminophen (NORCO) 5-325 MG per tablet Take 1 tablet by mouth Every 6 (Six) Hours As Needed for Moderate Pain .     • isosorbide mononitrate (IMDUR) 30 MG 24 hr tablet Take 1 tablet by mouth Daily. 90 tablet 1   • levalbuterol (XOPENEX HFA) 45 MCG/ACT inhaler Inhale 1-2 puffs Every 4 (Four) Hours As Needed for Wheezing.     • magnesium oxide (MAGOX) 400 (241.3 Mg) MG tablet tablet Take 400 mg by mouth 2 (Two) Times a Day.     • metFORMIN (GLUCOPHAGE) 500 MG tablet Take 500 mg by mouth Daily With Breakfast.     • methocarbamol (ROBAXIN) 500 MG tablet Take 500 mg by mouth 3 (Three) Times a Day As Needed for Muscle Spasms.     • metoprolol succinate XL (TOPROL-XL) 50 MG 24 hr tablet Take 1 tablet by mouth Daily. 90 tablet 1   • nitroglycerin (NITROSTAT) 0.4 MG SL tablet Place 1 tablet under the tongue Every 5 (Five) Minutes As Needed for Chest Pain. Take no more than 3 doses in 15 minutes. 90 tablet 1   • pantoprazole (PROTONIX) 40 MG EC tablet Take 40 mg by mouth Daily.     • polyethylene glycol (MIRALAX) packet Take 17 g by mouth Daily.     • potassium chloride (K-DUR,KLOR-CON) 10 MEQ CR tablet Take 1 tablet by mouth Daily. 90 tablet 1   • promethazine (PHENERGAN) 25 MG tablet Take 25 mg by mouth 3 (Three) Times a Day As Needed for Nausea or Vomiting.     • raNITIdine (ZANTAC) 150 MG tablet Take 150 mg by mouth Every Evening.     • rivaroxaban (Xarelto) 20 MG tablet Take 1 tablet by mouth Daily. 90 tablet 1   • rosuvastatin  (CRESTOR) 20 MG tablet Take 1 tablet by mouth Daily. 90 tablet 3   • sertraline (ZOLOFT) 100 MG tablet Take 1 tablet by mouth Daily. (Patient taking differently: Take 150 mg by mouth Daily. Take 1 & 1/2 tab of 100mg per dose) 90 tablet 1   • tiZANidine (ZANAFLEX) 4 MG tablet Take 4 mg by mouth At Night As Needed for Muscle Spasms.     • topiramate (TOPAMAX) 100 MG tablet Take 100 mg by mouth Daily.     • traZODone (DESYREL) 50 MG tablet Take 50 mg by mouth 2 (Two) Times a Day.     • Umeclidinium Bromide (INCRUSE ELLIPTA) 62.5 MCG/INH aerosol powder  Inhale 1 puff Daily.       No current facility-administered medications on file prior to visit.       ALLERGIES  Patient has no known allergies.    HISTORY  Past Medical History:   Diagnosis Date   • Anxiety and depression    • Atypical chest pain    • Diabetes mellitus (CMS/HCC)    • Dyslipidemia    • GERD (gastroesophageal reflux disease)    • H/O valvular heart disease     Echocardiogram 2013 revealing EF 60%. Mild left atrial enlargement, trace AI, trace MR, mild TR.   • Hypertension    • Insomnia    • Migraine headache     Chronic migraine headaches/Topamax therapy.    • Moderate obesity    • AMANDEEP on CPAP    • Pituitary adenoma (CMS/HCC)     History of pituitary adenoma/bromocriptine therapy.    • Sinus bradycardia     Severe symptomatic bradycardia secondary to sinus node dysfunction and sinus bradycardia with pacemaker at elective replacement indicator.   • SVT (supraventricular tachycardia) (CMS/HCC)     With prior ablation.       Social History     Socioeconomic History   • Marital status:      Spouse name: Not on file   • Number of children: Not on file   • Years of education: Not on file   • Highest education level: Not on file   Tobacco Use   • Smoking status: Former Smoker   • Smokeless tobacco: Never Used   Substance and Sexual Activity   • Alcohol use: No   • Drug use: No   • Sexual activity: Defer       Family History   Problem Relation Age of Onset  "  • Heart disease Mother    • Heart failure Mother    • Heart disease Father    • Heart failure Father        Review of Systems   Respiratory: Positive for shortness of breath. Negative for apnea, cough, choking, chest tightness, wheezing and stridor.    Cardiovascular: Positive for chest pain, palpitations and leg swelling.   Neurological: Positive for dizziness. Negative for syncope.       Objective     VITALS: /90 (BP Location: Left arm, Patient Position: Sitting, Cuff Size: Adult)   Pulse 82   Temp 96 °F (35.6 °C) (Infrared)   Ht 165.1 cm (65\")   Wt 88 kg (194 lb)   SpO2 98%   BMI 32.28 kg/m²     LABS:   Lab Results (most recent)     None          IMAGING:   No Images in the past 120 days found..    EXAM:  Physical Exam  Vitals reviewed.   Constitutional:       Appearance: She is well-developed.   HENT:      Head: Normocephalic and atraumatic.   Eyes:      Pupils: Pupils are equal, round, and reactive to light.   Neck:      Thyroid: No thyromegaly.      Vascular: No JVD.   Cardiovascular:      Rate and Rhythm: Normal rate and regular rhythm.      Heart sounds: Normal heart sounds. No murmur heard.   No friction rub. No gallop.       Comments: Pacer pocket clean, silghtly tender to touch  Pulmonary:      Effort: Pulmonary effort is normal. No respiratory distress.      Breath sounds: Normal breath sounds. No stridor. No wheezing or rales.   Chest:      Chest wall: No tenderness.   Musculoskeletal:         General: No tenderness or deformity.      Cervical back: Neck supple.   Skin:     General: Skin is warm and dry.   Neurological:      Mental Status: She is alert and oriented to person, place, and time.      Cranial Nerves: No cranial nerve deficit.      Coordination: Coordination normal.         Procedure     ECG 12 Lead    Date/Time: 9/27/2021 2:23 PM  Performed by: Anthony Faustin MD  Authorized by: Anthony Faustin MD           EKG: Paced atrial response with diffuse nonspecific ST-T changes " compared with EKG on March 2, 2021 no significant change       Assessment/Plan     Diagnoses and all orders for this visit:    1. Essential hypertension (Primary)    2. Presence of cardiac pacemaker    3. Mixed hyperlipidemia  -     Comprehensive Metabolic Panel; Future  -     Lipid Panel; Future    4. Paroxysmal atrial fibrillation (CMS/HCC)  -     CBC (No Diff); Future    5. Precordial chest pain    6. Shortness of breath    7. AMANDEEP on CPAP    Other orders  -     ECG 12 Lead    1.  Her blood pressure is well controlled we will continue current including furosemide and metoprolol management  2.  Continue have shortness of breath on and off as well as typical atypical chest pain recent stress test and echocardiogram was essentially unremarkable with no ischemia  3.  Unfortunately do not have any recent blood work she said that her primary care physician did blood work I do not have any results we will try to get it  4.  Patient has not seen the EP yet but her pacemaker I had a transtelephonic check back on July with and remarkable result with good function and no arrhythmias  5.  Unfortunately she has not been able to use a CPAP she is advised strongly to use the CPAP every night  6.  No further atrial fibrillation continue with anticoagulation and current medications    Return in about 6 months (around 3/27/2022).             MEDS ORDERED DURING VISIT:  No orders of the defined types were placed in this encounter.      As always, Arben Ibrahim MD  I appreciate very much the opportunity to participate in the cardiovascular care of your patients. Please do not hesitate to call me with any questions with regards to Lian Morfinbbs evaluation and management.         This document has been electronically signed by Anthony Faustin MD  September 27, 2021 15:04 EDT

## 2021-10-24 PROCEDURE — 93294 REM INTERROG EVL PM/LDLS PM: CPT | Performed by: STUDENT IN AN ORGANIZED HEALTH CARE EDUCATION/TRAINING PROGRAM

## 2021-10-24 PROCEDURE — 93296 REM INTERROG EVL PM/IDS: CPT | Performed by: STUDENT IN AN ORGANIZED HEALTH CARE EDUCATION/TRAINING PROGRAM

## 2022-01-13 ENCOUNTER — APPOINTMENT (OUTPATIENT)
Dept: GENERAL RADIOLOGY | Facility: HOSPITAL | Age: 62
End: 2022-01-13

## 2022-01-13 ENCOUNTER — APPOINTMENT (OUTPATIENT)
Dept: CT IMAGING | Facility: HOSPITAL | Age: 62
End: 2022-01-13

## 2022-01-13 ENCOUNTER — HOSPITAL ENCOUNTER (EMERGENCY)
Facility: HOSPITAL | Age: 62
Discharge: HOME | End: 2022-01-13
Attending: STUDENT IN AN ORGANIZED HEALTH CARE EDUCATION/TRAINING PROGRAM | Admitting: STUDENT IN AN ORGANIZED HEALTH CARE EDUCATION/TRAINING PROGRAM

## 2022-01-13 VITALS
BODY MASS INDEX: 29.35 KG/M2 | RESPIRATION RATE: 18 BRPM | HEIGHT: 67 IN | WEIGHT: 187 LBS | DIASTOLIC BLOOD PRESSURE: 81 MMHG | SYSTOLIC BLOOD PRESSURE: 119 MMHG | HEART RATE: 78 BPM | TEMPERATURE: 98.7 F | OXYGEN SATURATION: 94 %

## 2022-01-13 DIAGNOSIS — R07.9 CHEST PAIN, UNSPECIFIED TYPE: ICD-10-CM

## 2022-01-13 DIAGNOSIS — S92.355A CLOSED NONDISPLACED FRACTURE OF FIFTH METATARSAL BONE OF LEFT FOOT, INITIAL ENCOUNTER: ICD-10-CM

## 2022-01-13 DIAGNOSIS — R55 SYNCOPE, UNSPECIFIED SYNCOPE TYPE: Primary | ICD-10-CM

## 2022-01-13 LAB
ALBUMIN SERPL-MCNC: 4.17 G/DL (ref 3.5–5.2)
ALBUMIN/GLOB SERPL: 1.9 G/DL
ALP SERPL-CCNC: 70 U/L (ref 39–117)
ALT SERPL W P-5'-P-CCNC: 13 U/L (ref 1–33)
ANION GAP SERPL CALCULATED.3IONS-SCNC: 13.4 MMOL/L (ref 5–15)
APTT PPP: 27.8 SECONDS (ref 25.5–35.4)
AST SERPL-CCNC: 18 U/L (ref 1–32)
BASOPHILS # BLD AUTO: 0.07 10*3/MM3 (ref 0–0.2)
BASOPHILS NFR BLD AUTO: 0.8 % (ref 0–1.5)
BILIRUB SERPL-MCNC: 0.3 MG/DL (ref 0–1.2)
BILIRUB UR QL STRIP: NEGATIVE
BUN SERPL-MCNC: 7 MG/DL (ref 8–23)
BUN/CREAT SERPL: 8.2 (ref 7–25)
CALCIUM SPEC-SCNC: 9 MG/DL (ref 8.6–10.5)
CHLORIDE SERPL-SCNC: 106 MMOL/L (ref 98–107)
CLARITY UR: CLEAR
CO2 SERPL-SCNC: 24.6 MMOL/L (ref 22–29)
COLOR UR: YELLOW
CREAT SERPL-MCNC: 0.85 MG/DL (ref 0.57–1)
D DIMER PPP FEU-MCNC: 1.05 MCGFEU/ML (ref 0–0.5)
DEPRECATED RDW RBC AUTO: 43 FL (ref 37–54)
EOSINOPHIL # BLD AUTO: 0.15 10*3/MM3 (ref 0–0.4)
EOSINOPHIL NFR BLD AUTO: 1.8 % (ref 0.3–6.2)
ERYTHROCYTE [DISTWIDTH] IN BLOOD BY AUTOMATED COUNT: 13.5 % (ref 12.3–15.4)
FLUAV SUBTYP SPEC NAA+PROBE: NOT DETECTED
FLUBV RNA ISLT QL NAA+PROBE: NOT DETECTED
GFR SERPL CREATININE-BSD FRML MDRD: 68 ML/MIN/1.73
GLOBULIN UR ELPH-MCNC: 2.2 GM/DL
GLUCOSE SERPL-MCNC: 99 MG/DL (ref 65–99)
GLUCOSE UR STRIP-MCNC: NEGATIVE MG/DL
HCT VFR BLD AUTO: 41.2 % (ref 34–46.6)
HGB BLD-MCNC: 13.5 G/DL (ref 12–15.9)
HGB UR QL STRIP.AUTO: NEGATIVE
HOLD SPECIMEN: NORMAL
HOLD SPECIMEN: NORMAL
IMM GRANULOCYTES # BLD AUTO: 0.03 10*3/MM3 (ref 0–0.05)
IMM GRANULOCYTES NFR BLD AUTO: 0.4 % (ref 0–0.5)
INR PPP: 0.98 (ref 0.9–1.1)
KETONES UR QL STRIP: NEGATIVE
LEUKOCYTE ESTERASE UR QL STRIP.AUTO: NEGATIVE
LIPASE SERPL-CCNC: 28 U/L (ref 13–60)
LYMPHOCYTES # BLD AUTO: 1.72 10*3/MM3 (ref 0.7–3.1)
LYMPHOCYTES NFR BLD AUTO: 20.2 % (ref 19.6–45.3)
MAGNESIUM SERPL-MCNC: 2 MG/DL (ref 1.6–2.4)
MCH RBC QN AUTO: 28.5 PG (ref 26.6–33)
MCHC RBC AUTO-ENTMCNC: 32.8 G/DL (ref 31.5–35.7)
MCV RBC AUTO: 87.1 FL (ref 79–97)
MONOCYTES # BLD AUTO: 0.53 10*3/MM3 (ref 0.1–0.9)
MONOCYTES NFR BLD AUTO: 6.2 % (ref 5–12)
NEUTROPHILS NFR BLD AUTO: 6.03 10*3/MM3 (ref 1.7–7)
NEUTROPHILS NFR BLD AUTO: 70.6 % (ref 42.7–76)
NITRITE UR QL STRIP: NEGATIVE
NRBC BLD AUTO-RTO: 0 /100 WBC (ref 0–0.2)
NT-PROBNP SERPL-MCNC: 214.7 PG/ML (ref 0–900)
PH UR STRIP.AUTO: <=5 [PH] (ref 5–8)
PLATELET # BLD AUTO: 233 10*3/MM3 (ref 140–450)
PMV BLD AUTO: 9.8 FL (ref 6–12)
POTASSIUM SERPL-SCNC: 3.2 MMOL/L (ref 3.5–5.2)
PROT SERPL-MCNC: 6.4 G/DL (ref 6–8.5)
PROT UR QL STRIP: NEGATIVE
PROTHROMBIN TIME: 13.4 SECONDS (ref 12.8–14.5)
RBC # BLD AUTO: 4.73 10*6/MM3 (ref 3.77–5.28)
SARS-COV-2 RNA PNL SPEC NAA+PROBE: NOT DETECTED
SODIUM SERPL-SCNC: 144 MMOL/L (ref 136–145)
SP GR UR STRIP: <=1.005 (ref 1–1.03)
TROPONIN T SERPL-MCNC: <0.01 NG/ML (ref 0–0.03)
TROPONIN T SERPL-MCNC: <0.01 NG/ML (ref 0–0.03)
UROBILINOGEN UR QL STRIP: NORMAL
WBC NRBC COR # BLD: 8.53 10*3/MM3 (ref 3.4–10.8)
WHOLE BLOOD HOLD SPECIMEN: NORMAL
WHOLE BLOOD HOLD SPECIMEN: NORMAL

## 2022-01-13 PROCEDURE — 83690 ASSAY OF LIPASE: CPT | Performed by: PHYSICIAN ASSISTANT

## 2022-01-13 PROCEDURE — 73630 X-RAY EXAM OF FOOT: CPT | Performed by: RADIOLOGY

## 2022-01-13 PROCEDURE — 85610 PROTHROMBIN TIME: CPT | Performed by: PHYSICIAN ASSISTANT

## 2022-01-13 PROCEDURE — 71275 CT ANGIOGRAPHY CHEST: CPT | Performed by: RADIOLOGY

## 2022-01-13 PROCEDURE — 71045 X-RAY EXAM CHEST 1 VIEW: CPT | Performed by: RADIOLOGY

## 2022-01-13 PROCEDURE — 80053 COMPREHEN METABOLIC PANEL: CPT | Performed by: PHYSICIAN ASSISTANT

## 2022-01-13 PROCEDURE — 36415 COLL VENOUS BLD VENIPUNCTURE: CPT

## 2022-01-13 PROCEDURE — 71275 CT ANGIOGRAPHY CHEST: CPT

## 2022-01-13 PROCEDURE — 83880 ASSAY OF NATRIURETIC PEPTIDE: CPT | Performed by: PHYSICIAN ASSISTANT

## 2022-01-13 PROCEDURE — 84484 ASSAY OF TROPONIN QUANT: CPT | Performed by: PHYSICIAN ASSISTANT

## 2022-01-13 PROCEDURE — 70450 CT HEAD/BRAIN W/O DYE: CPT

## 2022-01-13 PROCEDURE — 85730 THROMBOPLASTIN TIME PARTIAL: CPT | Performed by: PHYSICIAN ASSISTANT

## 2022-01-13 PROCEDURE — 0 IOPAMIDOL PER 1 ML: Performed by: STUDENT IN AN ORGANIZED HEALTH CARE EDUCATION/TRAINING PROGRAM

## 2022-01-13 PROCEDURE — 87636 SARSCOV2 & INF A&B AMP PRB: CPT | Performed by: PHYSICIAN ASSISTANT

## 2022-01-13 PROCEDURE — 99285 EMERGENCY DEPT VISIT HI MDM: CPT

## 2022-01-13 PROCEDURE — 83735 ASSAY OF MAGNESIUM: CPT | Performed by: PHYSICIAN ASSISTANT

## 2022-01-13 PROCEDURE — 93010 ELECTROCARDIOGRAM REPORT: CPT | Performed by: INTERNAL MEDICINE

## 2022-01-13 PROCEDURE — 73630 X-RAY EXAM OF FOOT: CPT

## 2022-01-13 PROCEDURE — 85379 FIBRIN DEGRADATION QUANT: CPT | Performed by: PHYSICIAN ASSISTANT

## 2022-01-13 PROCEDURE — 70450 CT HEAD/BRAIN W/O DYE: CPT | Performed by: RADIOLOGY

## 2022-01-13 PROCEDURE — 81003 URINALYSIS AUTO W/O SCOPE: CPT | Performed by: PHYSICIAN ASSISTANT

## 2022-01-13 PROCEDURE — 71045 X-RAY EXAM CHEST 1 VIEW: CPT

## 2022-01-13 PROCEDURE — 85025 COMPLETE CBC W/AUTO DIFF WBC: CPT | Performed by: PHYSICIAN ASSISTANT

## 2022-01-13 PROCEDURE — 93005 ELECTROCARDIOGRAM TRACING: CPT | Performed by: PHYSICIAN ASSISTANT

## 2022-01-13 RX ORDER — POTASSIUM CHLORIDE 20 MEQ/1
40 TABLET, EXTENDED RELEASE ORAL ONCE
Status: COMPLETED | OUTPATIENT
Start: 2022-01-13 | End: 2022-01-13

## 2022-01-13 RX ORDER — SODIUM CHLORIDE 0.9 % (FLUSH) 0.9 %
10 SYRINGE (ML) INJECTION AS NEEDED
Status: DISCONTINUED | OUTPATIENT
Start: 2022-01-13 | End: 2022-01-13 | Stop reason: HOSPADM

## 2022-01-13 RX ORDER — ASPIRIN 81 MG/1
324 TABLET, CHEWABLE ORAL ONCE
Status: COMPLETED | OUTPATIENT
Start: 2022-01-13 | End: 2022-01-13

## 2022-01-13 RX ADMIN — POTASSIUM CHLORIDE 40 MEQ: 1500 TABLET, EXTENDED RELEASE ORAL at 18:30

## 2022-01-13 RX ADMIN — ASPIRIN 324 MG: 81 TABLET, CHEWABLE ORAL at 18:11

## 2022-01-13 RX ADMIN — IOPAMIDOL 88 ML: 755 INJECTION, SOLUTION INTRAVENOUS at 20:26

## 2022-01-14 ENCOUNTER — TRANSCRIBE ORDERS (OUTPATIENT)
Dept: ADMINISTRATIVE | Facility: HOSPITAL | Age: 62
End: 2022-01-14

## 2022-01-14 DIAGNOSIS — R07.9 CHEST PAIN, UNSPECIFIED TYPE: Primary | ICD-10-CM

## 2022-01-14 LAB
QT INTERVAL: 388 MS
QTC INTERVAL: 430 MS

## 2022-01-14 NOTE — ED PROVIDER NOTES
Subjective   61-year-old female who presents to the ED today due to a syncopal episode.  She states the syncopal episode occurred just prior to arrival.  She states she was cooking dinner and became lightheaded and blacked out.  She states she was only out for a couple seconds.  She states she injured her left foot during the syncopal episode.  She states she has had chest pain intermittently throughout today.  She states it woke her from sleep around 5 AM.  She states she had heaviness in her chest.  She states she took 1 nitro and went back to bed and the symptoms improved.  She states she did have an episode of chest pain just before she passed out.  She states she has not really felt very well today.  She denies any shortness of breath.  She denies any chest pain currently.  She is followed by Dr. Faustin with cardiology and reports that she has an appointment scheduled for later this month.      History provided by:  Patient  Syncope  Episode history:  Single  Most recent episode:  Today  Duration: a couple seconds.  Progression:  Resolved  Chronicity:  New  Context: normal activity and standing up    Relieved by:  Nothing  Worsened by:  Nothing  Associated symptoms: chest pain, dizziness and malaise/fatigue    Associated symptoms: no anxiety, no confusion, no diaphoresis, no difficulty breathing, no fever, no focal sensory loss, no focal weakness, no headaches, no nausea, no palpitations, no shortness of breath, no visual change, no vomiting and no weakness        Review of Systems   Constitutional: Positive for fatigue and malaise/fatigue. Negative for diaphoresis and fever.   HENT: Negative.    Eyes: Negative.    Respiratory: Negative for shortness of breath.    Cardiovascular: Positive for chest pain and syncope. Negative for palpitations.   Gastrointestinal: Negative.  Negative for nausea and vomiting.   Genitourinary: Negative.    Musculoskeletal: Negative.    Skin: Negative.    Neurological: Positive for  dizziness, syncope and light-headedness. Negative for focal weakness, weakness and headaches.   Psychiatric/Behavioral: Negative for confusion.   All other systems reviewed and are negative.      Past Medical History:   Diagnosis Date   • Anxiety and depression    • Atypical chest pain    • Diabetes mellitus (CMS/HCC)    • Dyslipidemia    • GERD (gastroesophageal reflux disease)    • H/O valvular heart disease     Echocardiogram 2013 revealing EF 60%. Mild left atrial enlargement, trace AI, trace MR, mild TR.   • Hypertension    • Insomnia    • Migraine headache     Chronic migraine headaches/Topamax therapy.    • Moderate obesity    • AMANDEEP on CPAP    • Pituitary adenoma (CMS/HCC)     History of pituitary adenoma/bromocriptine therapy.    • Sinus bradycardia     Severe symptomatic bradycardia secondary to sinus node dysfunction and sinus bradycardia with pacemaker at elective replacement indicator.   • SVT (supraventricular tachycardia) (CMS/HCC)     With prior ablation.       No Known Allergies    Past Surgical History:   Procedure Laterality Date   • PACEMAKER IMPLANTATION  2010    Apparent reported symptomatic bradycardia on metoprolol therapy/status post dual chamber [place] permanent pacemaker implantation by Dr. Michael Mcknight in 2010.       Family History   Problem Relation Age of Onset   • Heart disease Mother    • Heart failure Mother    • Heart disease Father    • Heart failure Father        Social History     Socioeconomic History   • Marital status:    Tobacco Use   • Smoking status: Former Smoker   • Smokeless tobacco: Never Used   Substance and Sexual Activity   • Alcohol use: No   • Drug use: No   • Sexual activity: Defer           Objective   Physical Exam  Vitals and nursing note reviewed.   Constitutional:       General: She is not in acute distress.     Appearance: Normal appearance. She is not diaphoretic.   HENT:      Head: Normocephalic and atraumatic.      Right Ear: External ear  normal.      Left Ear: External ear normal.   Eyes:      Conjunctiva/sclera: Conjunctivae normal.      Pupils: Pupils are equal, round, and reactive to light.   Cardiovascular:      Rate and Rhythm: Normal rate and regular rhythm.      Pulses: Normal pulses.      Heart sounds: Normal heart sounds.   Pulmonary:      Effort: Pulmonary effort is normal.      Breath sounds: Normal breath sounds. No wheezing or rhonchi.   Chest:      Chest wall: No tenderness.   Abdominal:      General: Bowel sounds are normal.      Palpations: Abdomen is soft.      Tenderness: There is no abdominal tenderness. There is no guarding or rebound.   Musculoskeletal:         General: Tenderness (lateral side of left foot with mild swelling noted) present.      Cervical back: Normal range of motion and neck supple. No tenderness.      Comments: Distal pulses intact   Skin:     General: Skin is warm and dry.      Capillary Refill: Capillary refill takes less than 2 seconds.   Neurological:      General: No focal deficit present.      Mental Status: She is alert and oriented to person, place, and time.   Psychiatric:         Mood and Affect: Mood normal.         Splint - Cast - Strapping  Performed by: Polina Weems PA  Authorized by: Javon Courtney MD     Consent:     Consent obtained:  Verbal    Consent given by:  Patient  Pre-procedure details:     Sensation:  Normal  Procedure details:     Laterality:  Left    Location:  Foot    Foot:  L foot    Splint type:  CAM walker boot    Supplies:  Prefabricated splint  Post-procedure details:     Pain:  Improved    Sensation:  Normal    Patient tolerance of procedure:  Tolerated well, no immediate complications  Comments:      Applied by ED tech and reviewed by me, crutches provided               ED Course  ED Course as of 01/13/22 2115   Thu Jan 13, 2022   1648 EKG at 1645 hrs. atrial paced rhythm 74 bpm, , QRS 88, QTc 430, regular axis.  Mild downsloping ST segments in anterolateral  precordial leads and inferior leads.  Finding similar to prior EKG.  No STEMI. [KP]   1758 XR Foot 3+ View Left  FINDINGS:  3 views of the left foot show nondisplaced fracture in the proximal  shaft of the fifth metatarsal.     No other fracture.     IMPRESSION:  Nondisplaced fracture in the proximal fifth metatarsal  [AH]   1758 XR Chest 1 View  FINDINGS:     There is no focal alveolar infiltrate or effusion.  The cardiac silhouette is normal. The pulmonary vasculature is  unremarkable.  There is no evidence of an acute osseous abnormality.   There are no suspicious-appearing parenchymal soft tissue nodules.        IMPRESSION:  No evidence of active or acute cardiopulmonary disease on today's chest  radiograph. [AH]   1759 CT Head Without Contrast  FINDINGS:   Today's study shows no mass, hemorrhage, or midline shift.   The ventricles, cisterns, and sulci are unremarkable. There is no  hydrocephalus.   There is no evidence of acute ischemia.  I do not see epidural or subdural hematoma.  The gray-white differentiation is appropriate.   The bone window setting images show no destructive calvarial lesion or  acute calvarial fracture.   The posterior fossa is unremarkable.        IMPRESSION:  No acute intracranial pathology. Nothing is seen on this exam to  specifically account for the patient's symptoms. [AH]   1813 D-Dimer, Quant(!!): 1.05  CT chest ordered [AH]   2028 CT Chest Pulmonary Embolism  FINDINGS: Today's study demonstrates opacification of the central  pulmonary vessels.   There are no filling defects.   There is no truncation.     No evidence of a pulmonary embolus.     Otherwise there are no parenchymal soft tissue nodules or masses.     There is no mediastinal lymph node enlargement     No pericardial or pleural effusion.        IMPRESSION:  No evidence of a pulmonary embolus. [AH]   2047 Patient reports that she is feeling better and denies any chest pain at this time.  I have offered admission for  observation due to her chest pain and syncope.  The patient is adamant at this time that she does not want to be admitted and would prefer to follow-up as an outpatient.  She states she is already scheduled to see her cardiologist this month.  She will be discharged home to follow-up outpatient.  A outpatient stress test has been ordered for her.  She was advised to follow-up with orthopedics in the office as well for her metatarsal fracture.  She was placed in Ortho boot and provided with crutches.  She states that she already has home pain medication that she can take for this.  She will return to the ED if her symptoms change or worsen. [AH]      ED Course User Index  [] Polina Weems, PA  [] Javon Courtney MD                                               HEART Score (for prediction of 6-week risk of major adverse cardiac event) reviewed and/or performed as part of the patient evaluation and treatment planning process.  The result associated with this review/performance is: 4       MDM  Number of Diagnoses or Management Options     Amount and/or Complexity of Data Reviewed  Clinical lab tests: reviewed  Tests in the radiology section of CPT®: reviewed  Tests in the medicine section of CPT®: reviewed    Patient Progress  Patient progress: improved      Final diagnoses:   Syncope, unspecified syncope type   Chest pain, unspecified type   Closed nondisplaced fracture of fifth metatarsal bone of left foot, initial encounter       ED Disposition  ED Disposition     ED Disposition Condition Comment    Discharge Stable           Anthony Faustin MD  [address] [place] [address]  [phone]    Schedule an appointment as soon as possible for a visit in 4 days      Javon Buchanan MD  [address] [place] [id number] [phone]    Schedule an appointment as soon as possible for a visit in 1 day           Medication List      Changed    furosemide 20 MG tablet  Commonly known as: LASIX  Take 1 tablet  by mouth Daily.  What changed: when to take this     sertraline 100 MG tablet  Commonly known as: ZOLOFT  Take 1 tablet by mouth Daily.  What changed:   · how much to take  · additional instructions             Polina Weems, PA  01/13/22 6702

## 2022-01-14 NOTE — DISCHARGE INSTRUCTIONS
You are being scheduled for an outpatient stress test.  Scheduling will call you with the date and time of your test.  Call the orthopedic office tomorrow to schedule a follow-up appointment for your foot fracture.  Wear the boot until you follow-up.  Use your crutches.  Take your home pain medication as prescribed.  Follow-up with your cardiologist in the office at the next available appointment.  Please return to the ED if any of your symptoms change or worsen.

## 2022-01-17 ENCOUNTER — APPOINTMENT (OUTPATIENT)
Dept: CARDIOLOGY | Facility: HOSPITAL | Age: 62
End: 2022-01-17

## 2022-01-17 ENCOUNTER — APPOINTMENT (OUTPATIENT)
Dept: NUCLEAR MEDICINE | Facility: HOSPITAL | Age: 62
End: 2022-01-17

## 2022-01-23 PROCEDURE — 93294 REM INTERROG EVL PM/LDLS PM: CPT | Performed by: STUDENT IN AN ORGANIZED HEALTH CARE EDUCATION/TRAINING PROGRAM

## 2022-01-23 PROCEDURE — 93296 REM INTERROG EVL PM/IDS: CPT | Performed by: STUDENT IN AN ORGANIZED HEALTH CARE EDUCATION/TRAINING PROGRAM

## 2022-01-24 ENCOUNTER — APPOINTMENT (OUTPATIENT)
Dept: NUCLEAR MEDICINE | Facility: HOSPITAL | Age: 62
End: 2022-01-24

## 2022-01-24 ENCOUNTER — APPOINTMENT (OUTPATIENT)
Dept: CARDIOLOGY | Facility: HOSPITAL | Age: 62
End: 2022-01-24

## 2022-02-07 ENCOUNTER — HOSPITAL ENCOUNTER (OUTPATIENT)
Dept: NUCLEAR MEDICINE | Facility: HOSPITAL | Age: 62
Discharge: HOME | End: 2022-02-07

## 2022-02-07 ENCOUNTER — HOSPITAL ENCOUNTER (OUTPATIENT)
Dept: CARDIOLOGY | Facility: HOSPITAL | Age: 62
Discharge: HOME | End: 2022-02-07

## 2022-02-07 DIAGNOSIS — R07.9 CHEST PAIN, UNSPECIFIED TYPE: ICD-10-CM

## 2022-02-07 LAB
BH CV NUCLEAR PRIOR STUDY: 3
BH CV REST NUCLEAR ISOTOPE DOSE: 10.6 MCI
BH CV STRESS BP STAGE 1: NORMAL
BH CV STRESS BP STAGE 2: NORMAL
BH CV STRESS COMMENTS STAGE 1: NORMAL
BH CV STRESS COMMENTS STAGE 2: NORMAL
BH CV STRESS DOSE REGADENOSON STAGE 1: 0.4
BH CV STRESS DURATION MIN STAGE 1: 0
BH CV STRESS DURATION MIN STAGE 2: 4
BH CV STRESS DURATION SEC STAGE 1: 10
BH CV STRESS DURATION SEC STAGE 2: 0
BH CV STRESS HR STAGE 1: 87
BH CV STRESS HR STAGE 2: 78
BH CV STRESS NUCLEAR ISOTOPE DOSE: 31 MCI
BH CV STRESS PROTOCOL 1: NORMAL
BH CV STRESS RECOVERY BP: NORMAL MMHG
BH CV STRESS RECOVERY HR: 72 BPM
BH CV STRESS STAGE 1: 1
BH CV STRESS STAGE 2: 2
LV EF NUC BP: 60 %
MAXIMAL PREDICTED HEART RATE: 158 BPM
PERCENT MAX PREDICTED HR: 49.37 %
STRESS BASELINE BP: NORMAL MMHG
STRESS BASELINE HR: 70 BPM
STRESS PERCENT HR: 58 %
STRESS POST PEAK BP: NORMAL MMHG
STRESS POST PEAK HR: 78 BPM
STRESS TARGET HR: 134 BPM

## 2022-02-07 PROCEDURE — 78452 HT MUSCLE IMAGE SPECT MULT: CPT

## 2022-02-07 PROCEDURE — 0 TECHNETIUM SESTAMIBI: Performed by: STUDENT IN AN ORGANIZED HEALTH CARE EDUCATION/TRAINING PROGRAM

## 2022-02-07 PROCEDURE — 25010000002 REGADENOSON 0.4 MG/5ML SOLUTION: Performed by: STUDENT IN AN ORGANIZED HEALTH CARE EDUCATION/TRAINING PROGRAM

## 2022-02-07 PROCEDURE — A9500 TC99M SESTAMIBI: HCPCS | Performed by: STUDENT IN AN ORGANIZED HEALTH CARE EDUCATION/TRAINING PROGRAM

## 2022-02-07 PROCEDURE — 78452 HT MUSCLE IMAGE SPECT MULT: CPT | Performed by: SPECIALIST

## 2022-02-07 PROCEDURE — 93018 CV STRESS TEST I&R ONLY: CPT | Performed by: SPECIALIST

## 2022-02-07 PROCEDURE — 93017 CV STRESS TEST TRACING ONLY: CPT

## 2022-02-07 RX ADMIN — TECHNETIUM TC 99M SESTAMIBI 1 DOSE: 1 INJECTION INTRAVENOUS at 09:09

## 2022-02-07 RX ADMIN — REGADENOSON 0.4 MG: 0.08 INJECTION, SOLUTION INTRAVENOUS at 09:09

## 2022-02-07 RX ADMIN — TECHNETIUM TC 99M SESTAMIBI 1 DOSE: 1 INJECTION INTRAVENOUS at 08:04

## 2022-02-24 ENCOUNTER — TRANSCRIBE ORDERS (OUTPATIENT)
Dept: ADMINISTRATIVE | Facility: HOSPITAL | Age: 62
End: 2022-02-24

## 2022-02-24 DIAGNOSIS — R51.9 INTRACTABLE EPISODIC HEADACHE, UNSPECIFIED HEADACHE TYPE: Primary | ICD-10-CM

## 2022-02-24 DIAGNOSIS — R55 SYNCOPE AND COLLAPSE: ICD-10-CM

## 2022-03-16 ENCOUNTER — APPOINTMENT (OUTPATIENT)
Dept: CT IMAGING | Facility: HOSPITAL | Age: 62
End: 2022-03-16

## 2022-03-16 ENCOUNTER — APPOINTMENT (OUTPATIENT)
Dept: RESPIRATORY THERAPY | Facility: HOSPITAL | Age: 62
End: 2022-03-16

## 2022-03-24 DIAGNOSIS — E78.2 MIXED HYPERLIPIDEMIA: ICD-10-CM

## 2022-03-24 RX ORDER — ROSUVASTATIN CALCIUM 20 MG/1
TABLET, COATED ORAL
Qty: 30 TABLET | Refills: 3 | Status: SHIPPED | OUTPATIENT
Start: 2022-03-24 | End: 2022-05-03 | Stop reason: SDUPTHER

## 2022-04-20 ENCOUNTER — APPOINTMENT (OUTPATIENT)
Dept: CT IMAGING | Facility: HOSPITAL | Age: 62
End: 2022-04-20

## 2022-04-20 ENCOUNTER — APPOINTMENT (OUTPATIENT)
Dept: RESPIRATORY THERAPY | Facility: HOSPITAL | Age: 62
End: 2022-04-20

## 2022-05-03 ENCOUNTER — OFFICE VISIT (OUTPATIENT)
Dept: CARDIOLOGY | Facility: CLINIC | Age: 62
End: 2022-05-03

## 2022-05-03 VITALS
HEART RATE: 81 BPM | BODY MASS INDEX: 28.38 KG/M2 | SYSTOLIC BLOOD PRESSURE: 103 MMHG | TEMPERATURE: 97.7 F | WEIGHT: 180.8 LBS | DIASTOLIC BLOOD PRESSURE: 70 MMHG | RESPIRATION RATE: 16 BRPM | HEIGHT: 67 IN

## 2022-05-03 DIAGNOSIS — R07.2 PRECORDIAL CHEST PAIN: ICD-10-CM

## 2022-05-03 DIAGNOSIS — I10 ESSENTIAL HYPERTENSION: ICD-10-CM

## 2022-05-03 DIAGNOSIS — G47.33 OSA (OBSTRUCTIVE SLEEP APNEA): ICD-10-CM

## 2022-05-03 DIAGNOSIS — I48.0 PAROXYSMAL ATRIAL FIBRILLATION: Primary | ICD-10-CM

## 2022-05-03 DIAGNOSIS — E78.2 MIXED HYPERLIPIDEMIA: ICD-10-CM

## 2022-05-03 DIAGNOSIS — Z95.0 PRESENCE OF CARDIAC PACEMAKER: ICD-10-CM

## 2022-05-03 PROCEDURE — 99214 OFFICE O/P EST MOD 30 MIN: CPT | Performed by: SPECIALIST

## 2022-05-03 PROCEDURE — 93000 ELECTROCARDIOGRAM COMPLETE: CPT | Performed by: SPECIALIST

## 2022-05-03 RX ORDER — NITROGLYCERIN 0.4 MG/1
0.4 TABLET SUBLINGUAL
Qty: 90 TABLET | Refills: 1 | Status: SHIPPED | OUTPATIENT
Start: 2022-05-03 | End: 2023-03-07 | Stop reason: SDUPTHER

## 2022-05-03 RX ORDER — METOPROLOL SUCCINATE 50 MG/1
50 TABLET, EXTENDED RELEASE ORAL DAILY
Qty: 90 TABLET | Refills: 1 | Status: SHIPPED | OUTPATIENT
Start: 2022-05-03 | End: 2023-03-07 | Stop reason: SDUPTHER

## 2022-05-03 RX ORDER — FUROSEMIDE 20 MG/1
20 TABLET ORAL DAILY
Qty: 90 TABLET | Refills: 1 | Status: SHIPPED | OUTPATIENT
Start: 2022-05-03

## 2022-05-03 RX ORDER — POTASSIUM CHLORIDE 750 MG/1
10 TABLET, EXTENDED RELEASE ORAL DAILY
Qty: 90 TABLET | Refills: 1 | Status: SHIPPED | OUTPATIENT
Start: 2022-05-03 | End: 2023-03-07 | Stop reason: SDUPTHER

## 2022-05-03 RX ORDER — ROSUVASTATIN CALCIUM 20 MG/1
20 TABLET, COATED ORAL EVERY EVENING
Qty: 90 TABLET | Refills: 1 | Status: SHIPPED | OUTPATIENT
Start: 2022-05-03 | End: 2023-03-07

## 2022-05-03 NOTE — PROGRESS NOTES
Subjective   Follow up, pacemaker, PAF  Lian Kang is a 62 y.o. female who presents to day for Palpitations (SVT), Dizziness (01/2022 er visit s/p syncope), Shortness of Breath (Routine activity), Fatigue (excess), and Med Management (verbal).    CHIEF COMPLIANT  Chief Complaint   Patient presents with   • Palpitations     SVT   • Dizziness     01/2022 er visit s/p syncope   • Shortness of Breath     Routine activity   • Fatigue     excess   • Med Management     verbal       Active Problems:  Problem List Items Addressed This Visit        Cardiac and Vasculature    Presence of cardiac pacemaker    Essential hypertension    Relevant Medications    potassium chloride (K-DUR,KLOR-CON) 10 MEQ CR tablet    metoprolol succinate XL (TOPROL-XL) 50 MG 24 hr tablet    furosemide (LASIX) 20 MG tablet    Mixed hyperlipidemia    Relevant Medications    rosuvastatin (CRESTOR) 20 MG tablet    Other Relevant Orders    Lipid Panel    Comprehensive Metabolic Panel    Paroxysmal atrial fibrillation (HCC) - Primary    Relevant Medications    rivaroxaban (Xarelto) 20 MG tablet    metoprolol succinate XL (TOPROL-XL) 50 MG 24 hr tablet    nitroglycerin (NITROSTAT) 0.4 MG SL tablet    Other Relevant Orders    CBC (No Diff)    Precordial chest pain    Relevant Medications    nitroglycerin (NITROSTAT) 0.4 MG SL tablet       Sleep    AMANDEEP (obstructive sleep apnea)          HPI  HPI  Is under so much stress right now for social reasons but cardiac wise she denies any palpitations no chest pain she has intermittent edema no significant shortness of breath gets dizzy when standing up a little bit he has not been using the CPAP  PRIOR MEDS  Current Outpatient Medications on File Prior to Visit   Medication Sig Dispense Refill   • albuterol sulfate  (90 Base) MCG/ACT inhaler Inhale 2 puffs 4 (Four) Times a Day As Needed for Wheezing.     • bromocriptine (PARLODEL) 2.5 MG tablet Take 2.5 mg by mouth Daily.     • busPIRone (BUSPAR) 10 MG  tablet Take 10 mg by mouth 3 (Three) Times a Day As Needed (anxiety).     • cetirizine (zyrTEC) 10 MG tablet Take 10 mg by mouth Daily.     • clonazePAM (KlonoPIN) 0.5 MG tablet Take 0.25 mg by mouth 3 (Three) Times a Day As Needed for Anxiety.     • docusate sodium (COLACE) 100 MG capsule Take 100 mg by mouth 2 (Two) Times a Day As Needed for Constipation.     • escitalopram (LEXAPRO) 10 MG tablet Take 10 mg by mouth Daily.     • famotidine (PEPCID) 20 MG tablet Daily.     • fluticasone (FLONASE) 50 MCG/ACT nasal spray 2 sprays into the nostril(s) as directed by provider Daily.     • gabapentin (NEURONTIN) 300 MG capsule Take 300 mg by mouth 3 (Three) Times a Day.     • HYDROcodone-acetaminophen (NORCO) 5-325 MG per tablet Take 1 tablet by mouth Every 6 (Six) Hours As Needed for Moderate Pain .     • isosorbide mononitrate (IMDUR) 30 MG 24 hr tablet Take 1 tablet by mouth Daily. 90 tablet 1   • levalbuterol (XOPENEX HFA) 45 MCG/ACT inhaler Inhale 1-2 puffs Every 4 (Four) Hours As Needed for Wheezing.     • magnesium oxide (MAGOX) 400 (241.3 Mg) MG tablet tablet Take 400 mg by mouth 2 (Two) Times a Day.     • metFORMIN (GLUCOPHAGE) 500 MG tablet Take 500 mg by mouth Daily With Breakfast.     • methocarbamol (ROBAXIN) 500 MG tablet Take 500 mg by mouth 3 (Three) Times a Day As Needed for Muscle Spasms.     • pantoprazole (PROTONIX) 40 MG EC tablet Take 40 mg by mouth Daily.     • polyethylene glycol (MIRALAX) packet Take 17 g by mouth Daily.     • promethazine (PHENERGAN) 25 MG tablet Take 25 mg by mouth 3 (Three) Times a Day As Needed for Nausea or Vomiting.     • raNITIdine (ZANTAC) 150 MG tablet Take 150 mg by mouth Every Evening.     • sertraline (ZOLOFT) 100 MG tablet Take 1 tablet by mouth Daily. (Patient taking differently: Take 150 mg by mouth Daily. Take 1 & 1/2 tab of 100mg per dose) 90 tablet 1   • tiZANidine (ZANAFLEX) 4 MG tablet Take 4 mg by mouth At Night As Needed for Muscle Spasms.     • topiramate  (TOPAMAX) 100 MG tablet Take 100 mg by mouth Daily.     • traZODone (DESYREL) 50 MG tablet Take 50 mg by mouth 2 (Two) Times a Day.     • Umeclidinium Bromide (INCRUSE ELLIPTA) 62.5 MCG/INH aerosol powder  Inhale 1 puff Daily.     • [DISCONTINUED] furosemide (LASIX) 20 MG tablet Take 1 tablet by mouth Daily. (Patient taking differently: Take 20 mg by mouth 2 (Two) Times a Day.) 90 tablet 1   • [DISCONTINUED] metoprolol succinate XL (TOPROL-XL) 50 MG 24 hr tablet Take 1 tablet by mouth Daily. 90 tablet 1   • [DISCONTINUED] nitroglycerin (NITROSTAT) 0.4 MG SL tablet Place 1 tablet under the tongue Every 5 (Five) Minutes As Needed for Chest Pain. Take no more than 3 doses in 15 minutes. 90 tablet 1   • [DISCONTINUED] potassium chloride (K-DUR,KLOR-CON) 10 MEQ CR tablet Take 1 tablet by mouth Daily. 90 tablet 1   • [DISCONTINUED] rivaroxaban (Xarelto) 20 MG tablet Take 1 tablet by mouth Daily. 90 tablet 1   • [DISCONTINUED] rosuvastatin (CRESTOR) 20 MG tablet TAKE ONE TABLET BY MOUTH EVERY DAY IN THE EVENING 30 tablet 3     No current facility-administered medications on file prior to visit.       ALLERGIES  Patient has no known allergies.    HISTORY  Past Medical History:   Diagnosis Date   • Anxiety and depression    • Atypical chest pain    • Diabetes mellitus (HCC)    • Dyslipidemia    • GERD (gastroesophageal reflux disease)    • H/O valvular heart disease     Echocardiogram 2013 revealing EF 60%. Mild left atrial enlargement, trace AI, trace MR, mild TR.   • Hypertension    • Insomnia    • Migraine headache     Chronic migraine headaches/Topamax therapy.    • Moderate obesity    • AMANDEEP on CPAP    • Pituitary adenoma (HCC)     History of pituitary adenoma/bromocriptine therapy.    • Sinus bradycardia     Severe symptomatic bradycardia secondary to sinus node dysfunction and sinus bradycardia with pacemaker at elective replacement indicator.   • SVT (supraventricular tachycardia) ([facility])     With prior ablation.  "      Social History     Socioeconomic History   • Marital status:    Tobacco Use   • Smoking status: Former Smoker     Packs/day: 0.25     Types: Cigarettes   • Smokeless tobacco: Never Used   Substance and Sexual Activity   • Alcohol use: No   • Drug use: No   • Sexual activity: Defer       Family History   Problem Relation Age of Onset   • Heart disease Mother    • Heart failure Mother    • Heart disease Father    • Heart failure Father        Review of Systems   Constitutional: Positive for fatigue.   Respiratory: Negative for apnea, cough, choking, chest tightness, shortness of breath, wheezing and stridor.    Cardiovascular: Positive for leg swelling. Negative for chest pain and palpitations.   Neurological: Positive for syncope.       Objective     VITALS: /70   Pulse 81   Temp 97.7 °F (36.5 °C)   Resp 16   Ht 170.2 cm (67\")   Wt 82 kg (180 lb 12.8 oz)   BMI 28.32 kg/m²     LABS:   Lab Results (most recent)     None          IMAGING:   XR Foot 3+ View Left    Result Date: 1/13/2022  Nondisplaced fracture in the proximal fifth metatarsal  This report was finalized on 1/13/2022 5:40 PM by Dr. Oleg Harris MD.      CT Head Without Contrast    Result Date: 1/13/2022  No acute intracranial pathology. Nothing is seen on this exam to specifically account for the patient's symptoms.  This report was finalized on 1/13/2022 5:38 PM by Dr. Oleg Harris MD.      XR Chest 1 View    Result Date: 1/13/2022  No evidence of active or acute cardiopulmonary disease on today's chest radiograph.  This report was finalized on 1/13/2022 5:39 PM by Dr. Oleg Harris MD.      CT Chest Pulmonary Embolism    Result Date: 1/13/2022  No evidence of a pulmonary embolus.  This report was finalized on 1/13/2022 8:18 PM by Dr. Oleg Harris MD.        EXAM:  Physical Exam  Vitals reviewed.   Constitutional:       Appearance: She is well-developed.   HENT:      Head: Normocephalic and atraumatic.   Eyes:      Pupils: " Pupils are equal, round, and reactive to light.   Neck:      Thyroid: No thyromegaly.      Vascular: No JVD.   Cardiovascular:      Rate and Rhythm: Normal rate and regular rhythm.      Heart sounds: Normal heart sounds. No murmur heard.    No friction rub. No gallop.   Pulmonary:      Effort: Pulmonary effort is normal. No respiratory distress.      Breath sounds: Normal breath sounds. No stridor. No wheezing or rales.   Chest:      Chest wall: No tenderness.   Musculoskeletal:         General: No tenderness or deformity.      Cervical back: Neck supple.   Skin:     General: Skin is warm and dry.   Neurological:      Mental Status: She is alert and oriented to person, place, and time.      Cranial Nerves: No cranial nerve deficit.      Coordination: Coordination normal.         Procedure     ECG 12 Lead    Date/Time: 5/3/2022 11:01 AM  Performed by: Anthony Faustin MD  Authorized by: Anthony Faustin MD           EKG: Paced atrial response with diffuse nonspecific T wave inversion possibly related to memory T wave with QTC of 388 ms, and with normal response to the magnet, compared to EKG on January 13, 2022 no significant       Assessment/Plan     Diagnoses and all orders for this visit:    1. Paroxysmal atrial fibrillation (HCC) (Primary)  -     rivaroxaban (Xarelto) 20 MG tablet; Take 1 tablet by mouth Daily.  Dispense: 90 tablet; Refill: 1  -     CBC (No Diff); Future    2. Presence of cardiac pacemaker    3. Essential hypertension  -     potassium chloride (K-DUR,KLOR-CON) 10 MEQ CR tablet; Take 1 tablet by mouth Daily.  Dispense: 90 tablet; Refill: 1  -     metoprolol succinate XL (TOPROL-XL) 50 MG 24 hr tablet; Take 1 tablet by mouth Daily.  Dispense: 90 tablet; Refill: 1  -     furosemide (LASIX) 20 MG tablet; Take 1 tablet by mouth Daily.  Dispense: 90 tablet; Refill: 1    4. Mixed hyperlipidemia  -     rosuvastatin (CRESTOR) 20 MG tablet; Take 1 tablet by mouth Every Evening.  Dispense: 90 tablet; Refill:  1  -     Lipid Panel; Future  -     Comprehensive Metabolic Panel; Future    5. AMANDEEP (obstructive sleep apnea)    6. Precordial chest pain  -     nitroglycerin (NITROSTAT) 0.4 MG SL tablet; Place 1 tablet under the tongue Every 5 (Five) Minutes As Needed for Chest Pain. Take no more than 3 doses in 15 minutes.  Dispense: 90 tablet; Refill: 1    Other orders  -     ECG 12 Lead    1.  Patient is maintaining normal sinus rhythm on the pacemaker interrogation she had a burden of 1% on last we will continue the current medications and rate control strategy  2.  We will continue with the Xarelto for thromboembolic prophylaxis  3.  Pacemaker stressed.  Cardiac interrogation back on January showed good battery life and 1% tacky episodes  4.  Unfortunately I do not have any labs she had labs yesterday at her primary care physician showed multiple available to me I do not have any recent lipid profile I will try to get that before the next visit  5.  She is quit using the CPAP I strongly advised her to use that because sleep apnea      Return in about 6 months (around 11/3/2022).               MEDS ORDERED DURING VISIT:  New Medications Ordered This Visit   Medications   • rosuvastatin (CRESTOR) 20 MG tablet     Sig: Take 1 tablet by mouth Every Evening.     Dispense:  90 tablet     Refill:  1   • rivaroxaban (Xarelto) 20 MG tablet     Sig: Take 1 tablet by mouth Daily.     Dispense:  90 tablet     Refill:  1   • potassium chloride (K-DUR,KLOR-CON) 10 MEQ CR tablet     Sig: Take 1 tablet by mouth Daily.     Dispense:  90 tablet     Refill:  1   • metoprolol succinate XL (TOPROL-XL) 50 MG 24 hr tablet     Sig: Take 1 tablet by mouth Daily.     Dispense:  90 tablet     Refill:  1   • nitroglycerin (NITROSTAT) 0.4 MG SL tablet     Sig: Place 1 tablet under the tongue Every 5 (Five) Minutes As Needed for Chest Pain. Take no more than 3 doses in 15 minutes.     Dispense:  90 tablet     Refill:  1   • furosemide (LASIX) 20 MG tablet      Sig: Take 1 tablet by mouth Daily.     Dispense:  90 tablet     Refill:  1       As always, Arben Ibrahim MD  I appreciate very much the opportunity to participate in the cardiovascular care of your patients. Please do not hesitate to call me with any questions with regards to Lian Pearl evaluation and management.         This document has been electronically signed by Anthony Faustin MD  May 3, 2022 12:07 EDT

## 2022-05-18 ENCOUNTER — APPOINTMENT (OUTPATIENT)
Dept: CT IMAGING | Facility: HOSPITAL | Age: 62
End: 2022-05-18

## 2022-05-18 ENCOUNTER — APPOINTMENT (OUTPATIENT)
Dept: RESPIRATORY THERAPY | Facility: HOSPITAL | Age: 62
End: 2022-05-18

## 2022-06-15 ENCOUNTER — HOSPITAL ENCOUNTER (OUTPATIENT)
Dept: CT IMAGING | Facility: HOSPITAL | Age: 62
Discharge: HOME | End: 2022-06-15

## 2022-06-15 ENCOUNTER — HOSPITAL ENCOUNTER (OUTPATIENT)
Dept: RESPIRATORY THERAPY | Facility: HOSPITAL | Age: 62
Discharge: HOME | End: 2022-06-15

## 2022-06-15 DIAGNOSIS — R51.9 INTRACTABLE EPISODIC HEADACHE, UNSPECIFIED HEADACHE TYPE: ICD-10-CM

## 2022-06-15 DIAGNOSIS — R55 SYNCOPE AND COLLAPSE: ICD-10-CM

## 2022-06-15 LAB — CREAT BLDA-MCNC: 0.7 MG/DL (ref 0.6–1.3)

## 2022-06-15 PROCEDURE — 70460 CT HEAD/BRAIN W/DYE: CPT

## 2022-06-15 PROCEDURE — 70460 CT HEAD/BRAIN W/DYE: CPT | Performed by: RADIOLOGY

## 2022-06-15 PROCEDURE — 95819 EEG AWAKE AND ASLEEP: CPT

## 2022-06-15 PROCEDURE — 82565 ASSAY OF CREATININE: CPT

## 2022-06-15 PROCEDURE — 25010000002 IOPAMIDOL 61 % SOLUTION: Performed by: FAMILY MEDICINE

## 2022-06-15 RX ADMIN — IOPAMIDOL 100 ML: 612 INJECTION, SOLUTION INTRAVENOUS at 12:23

## 2022-06-28 ENCOUNTER — TRANSCRIBE ORDERS (OUTPATIENT)
Dept: ADMINISTRATIVE | Facility: HOSPITAL | Age: 62
End: 2022-06-28

## 2022-06-28 DIAGNOSIS — M43.12 SPONDYLOLISTHESIS OF CERVICAL REGION: Primary | ICD-10-CM

## 2022-08-16 ENCOUNTER — APPOINTMENT (OUTPATIENT)
Dept: CT IMAGING | Facility: HOSPITAL | Age: 62
End: 2022-08-16

## 2022-08-16 ENCOUNTER — APPOINTMENT (OUTPATIENT)
Dept: GENERAL RADIOLOGY | Facility: HOSPITAL | Age: 62
End: 2022-08-16

## 2022-08-16 ENCOUNTER — HOSPITAL ENCOUNTER (EMERGENCY)
Facility: HOSPITAL | Age: 62
Discharge: HOME | End: 2022-08-16
Attending: STUDENT IN AN ORGANIZED HEALTH CARE EDUCATION/TRAINING PROGRAM | Admitting: STUDENT IN AN ORGANIZED HEALTH CARE EDUCATION/TRAINING PROGRAM

## 2022-08-16 VITALS
SYSTOLIC BLOOD PRESSURE: 120 MMHG | BODY MASS INDEX: 24.64 KG/M2 | HEIGHT: 67 IN | HEART RATE: 77 BPM | RESPIRATION RATE: 17 BRPM | DIASTOLIC BLOOD PRESSURE: 84 MMHG | OXYGEN SATURATION: 97 % | TEMPERATURE: 98.2 F | WEIGHT: 157 LBS

## 2022-08-16 DIAGNOSIS — F10.920 ACUTE ALCOHOLIC INTOXICATION WITHOUT COMPLICATION: Primary | ICD-10-CM

## 2022-08-16 DIAGNOSIS — S00.03XA CONTUSION OF SCALP, INITIAL ENCOUNTER: ICD-10-CM

## 2022-08-16 LAB
ABO GROUP BLD: NORMAL
ABO GROUP BLD: NORMAL
ALBUMIN SERPL-MCNC: 4.33 G/DL (ref 3.5–5.2)
ALBUMIN/GLOB SERPL: 1.8 G/DL
ALP SERPL-CCNC: 72 U/L (ref 39–117)
ALT SERPL W P-5'-P-CCNC: 15 U/L (ref 1–33)
AMMONIA BLD-SCNC: 17 UMOL/L (ref 11–51)
AMPHET+METHAMPHET UR QL: NEGATIVE
AMPHETAMINES UR QL: NEGATIVE
ANION GAP SERPL CALCULATED.3IONS-SCNC: 13.9 MMOL/L (ref 5–15)
APTT PPP: 29.3 SECONDS (ref 26.5–34.5)
AST SERPL-CCNC: 20 U/L (ref 1–32)
BARBITURATES UR QL SCN: NEGATIVE
BASOPHILS # BLD AUTO: 0.09 10*3/MM3 (ref 0–0.2)
BASOPHILS NFR BLD AUTO: 1.2 % (ref 0–1.5)
BENZODIAZ UR QL SCN: NEGATIVE
BILIRUB SERPL-MCNC: <0.2 MG/DL (ref 0–1.2)
BILIRUB UR QL STRIP: NEGATIVE
BLD GP AB SCN SERPL QL: NEGATIVE
BUN SERPL-MCNC: 9 MG/DL (ref 8–23)
BUN/CREAT SERPL: 9.8 (ref 7–25)
BUPRENORPHINE SERPL-MCNC: NEGATIVE NG/ML
CALCIUM SPEC-SCNC: 9.2 MG/DL (ref 8.6–10.5)
CANNABINOIDS SERPL QL: NEGATIVE
CHLORIDE SERPL-SCNC: 110 MMOL/L (ref 98–107)
CK SERPL-CCNC: 159 U/L (ref 20–180)
CLARITY UR: CLEAR
CO2 SERPL-SCNC: 23.1 MMOL/L (ref 22–29)
COCAINE UR QL: NEGATIVE
COLOR UR: YELLOW
CREAT BLDA-MCNC: 1 MG/DL (ref 0.6–1.3)
CREAT SERPL-MCNC: 0.92 MG/DL (ref 0.57–1)
D DIMER PPP FEU-MCNC: 1.74 MCGFEU/ML (ref 0–0.5)
DEPRECATED RDW RBC AUTO: 42.9 FL (ref 37–54)
EGFRCR SERPLBLD CKD-EPI 2021: 70.1 ML/MIN/1.73
EOSINOPHIL # BLD AUTO: 0.13 10*3/MM3 (ref 0–0.4)
EOSINOPHIL NFR BLD AUTO: 1.7 % (ref 0.3–6.2)
ERYTHROCYTE [DISTWIDTH] IN BLOOD BY AUTOMATED COUNT: 12.9 % (ref 12.3–15.4)
ETHANOL BLD-MCNC: 155 MG/DL (ref 0–10)
ETHANOL UR QL: 0.15 %
FLUAV RNA RESP QL NAA+PROBE: NOT DETECTED
FLUBV RNA RESP QL NAA+PROBE: NOT DETECTED
GLOBULIN UR ELPH-MCNC: 2.4 GM/DL
GLUCOSE BLDC GLUCOMTR-MCNC: 77 MG/DL (ref 70–130)
GLUCOSE SERPL-MCNC: 102 MG/DL (ref 65–99)
GLUCOSE UR STRIP-MCNC: NEGATIVE MG/DL
HCT VFR BLD AUTO: 43.5 % (ref 34–46.6)
HGB BLD-MCNC: 14 G/DL (ref 12–15.9)
HGB UR QL STRIP.AUTO: NEGATIVE
HOLD SPECIMEN: NORMAL
HOLD SPECIMEN: NORMAL
IMM GRANULOCYTES # BLD AUTO: 0.05 10*3/MM3 (ref 0–0.05)
IMM GRANULOCYTES NFR BLD AUTO: 0.7 % (ref 0–0.5)
INR PPP: 0.98 (ref 0.9–1.1)
KETONES UR QL STRIP: NEGATIVE
LEUKOCYTE ESTERASE UR QL STRIP.AUTO: NEGATIVE
LYMPHOCYTES # BLD AUTO: 1.94 10*3/MM3 (ref 0.7–3.1)
LYMPHOCYTES NFR BLD AUTO: 25.9 % (ref 19.6–45.3)
MAGNESIUM SERPL-MCNC: 2.3 MG/DL (ref 1.6–2.4)
MCH RBC QN AUTO: 29.1 PG (ref 26.6–33)
MCHC RBC AUTO-ENTMCNC: 32.2 G/DL (ref 31.5–35.7)
MCV RBC AUTO: 90.4 FL (ref 79–97)
METHADONE UR QL SCN: NEGATIVE
MONOCYTES # BLD AUTO: 0.41 10*3/MM3 (ref 0.1–0.9)
MONOCYTES NFR BLD AUTO: 5.5 % (ref 5–12)
NEUTROPHILS NFR BLD AUTO: 4.86 10*3/MM3 (ref 1.7–7)
NEUTROPHILS NFR BLD AUTO: 65 % (ref 42.7–76)
NITRITE UR QL STRIP: NEGATIVE
NRBC BLD AUTO-RTO: 0 /100 WBC (ref 0–0.2)
OPIATES UR QL: NEGATIVE
OXYCODONE UR QL SCN: NEGATIVE
PCP UR QL SCN: NEGATIVE
PH UR STRIP.AUTO: 5.5 [PH] (ref 5–8)
PLATELET # BLD AUTO: 216 10*3/MM3 (ref 140–450)
PMV BLD AUTO: 10.3 FL (ref 6–12)
POTASSIUM SERPL-SCNC: 3.3 MMOL/L (ref 3.5–5.2)
PROPOXYPH UR QL: NEGATIVE
PROT SERPL-MCNC: 6.7 G/DL (ref 6–8.5)
PROT UR QL STRIP: NEGATIVE
PROTHROMBIN TIME: 13.2 SECONDS (ref 12.1–14.7)
RBC # BLD AUTO: 4.81 10*6/MM3 (ref 3.77–5.28)
RH BLD: POSITIVE
RH BLD: POSITIVE
SARS-COV-2 RNA RESP QL NAA+PROBE: NOT DETECTED
SODIUM SERPL-SCNC: 147 MMOL/L (ref 136–145)
SP GR UR STRIP: <=1.005 (ref 1–1.03)
T&S EXPIRATION DATE: NORMAL
TRICYCLICS UR QL SCN: NEGATIVE
TROPONIN T SERPL-MCNC: <0.01 NG/ML (ref 0–0.03)
UROBILINOGEN UR QL STRIP: NORMAL
WBC NRBC COR # BLD: 7.48 10*3/MM3 (ref 3.4–10.8)
WHOLE BLOOD HOLD COAG: NORMAL
WHOLE BLOOD HOLD SPECIMEN: NORMAL

## 2022-08-16 PROCEDURE — 86901 BLOOD TYPING SEROLOGIC RH(D): CPT

## 2022-08-16 PROCEDURE — 83735 ASSAY OF MAGNESIUM: CPT | Performed by: STUDENT IN AN ORGANIZED HEALTH CARE EDUCATION/TRAINING PROGRAM

## 2022-08-16 PROCEDURE — 86900 BLOOD TYPING SEROLOGIC ABO: CPT

## 2022-08-16 PROCEDURE — 70450 CT HEAD/BRAIN W/O DYE: CPT

## 2022-08-16 PROCEDURE — 84484 ASSAY OF TROPONIN QUANT: CPT | Performed by: STUDENT IN AN ORGANIZED HEALTH CARE EDUCATION/TRAINING PROGRAM

## 2022-08-16 PROCEDURE — 82565 ASSAY OF CREATININE: CPT

## 2022-08-16 PROCEDURE — P9612 CATHETERIZE FOR URINE SPEC: HCPCS

## 2022-08-16 PROCEDURE — 82962 GLUCOSE BLOOD TEST: CPT

## 2022-08-16 PROCEDURE — 70496 CT ANGIOGRAPHY HEAD: CPT

## 2022-08-16 PROCEDURE — 87636 SARSCOV2 & INF A&B AMP PRB: CPT | Performed by: STUDENT IN AN ORGANIZED HEALTH CARE EDUCATION/TRAINING PROGRAM

## 2022-08-16 PROCEDURE — 72125 CT NECK SPINE W/O DYE: CPT

## 2022-08-16 PROCEDURE — 85025 COMPLETE CBC W/AUTO DIFF WBC: CPT | Performed by: STUDENT IN AN ORGANIZED HEALTH CARE EDUCATION/TRAINING PROGRAM

## 2022-08-16 PROCEDURE — 80053 COMPREHEN METABOLIC PANEL: CPT | Performed by: STUDENT IN AN ORGANIZED HEALTH CARE EDUCATION/TRAINING PROGRAM

## 2022-08-16 PROCEDURE — 70498 CT ANGIOGRAPHY NECK: CPT

## 2022-08-16 PROCEDURE — 85730 THROMBOPLASTIN TIME PARTIAL: CPT | Performed by: STUDENT IN AN ORGANIZED HEALTH CARE EDUCATION/TRAINING PROGRAM

## 2022-08-16 PROCEDURE — 81003 URINALYSIS AUTO W/O SCOPE: CPT | Performed by: STUDENT IN AN ORGANIZED HEALTH CARE EDUCATION/TRAINING PROGRAM

## 2022-08-16 PROCEDURE — 82077 ASSAY SPEC XCP UR&BREATH IA: CPT | Performed by: STUDENT IN AN ORGANIZED HEALTH CARE EDUCATION/TRAINING PROGRAM

## 2022-08-16 PROCEDURE — 74177 CT ABD & PELVIS W/CONTRAST: CPT

## 2022-08-16 PROCEDURE — 80306 DRUG TEST PRSMV INSTRMNT: CPT | Performed by: STUDENT IN AN ORGANIZED HEALTH CARE EDUCATION/TRAINING PROGRAM

## 2022-08-16 PROCEDURE — 86850 RBC ANTIBODY SCREEN: CPT | Performed by: STUDENT IN AN ORGANIZED HEALTH CARE EDUCATION/TRAINING PROGRAM

## 2022-08-16 PROCEDURE — 85379 FIBRIN DEGRADATION QUANT: CPT | Performed by: STUDENT IN AN ORGANIZED HEALTH CARE EDUCATION/TRAINING PROGRAM

## 2022-08-16 PROCEDURE — 71045 X-RAY EXAM CHEST 1 VIEW: CPT

## 2022-08-16 PROCEDURE — 86901 BLOOD TYPING SEROLOGIC RH(D): CPT | Performed by: STUDENT IN AN ORGANIZED HEALTH CARE EDUCATION/TRAINING PROGRAM

## 2022-08-16 PROCEDURE — 82550 ASSAY OF CK (CPK): CPT | Performed by: STUDENT IN AN ORGANIZED HEALTH CARE EDUCATION/TRAINING PROGRAM

## 2022-08-16 PROCEDURE — 85610 PROTHROMBIN TIME: CPT | Performed by: STUDENT IN AN ORGANIZED HEALTH CARE EDUCATION/TRAINING PROGRAM

## 2022-08-16 PROCEDURE — 0 IOPAMIDOL PER 1 ML: Performed by: STUDENT IN AN ORGANIZED HEALTH CARE EDUCATION/TRAINING PROGRAM

## 2022-08-16 PROCEDURE — 99284 EMERGENCY DEPT VISIT MOD MDM: CPT

## 2022-08-16 PROCEDURE — 72128 CT CHEST SPINE W/O DYE: CPT

## 2022-08-16 PROCEDURE — 86900 BLOOD TYPING SEROLOGIC ABO: CPT | Performed by: STUDENT IN AN ORGANIZED HEALTH CARE EDUCATION/TRAINING PROGRAM

## 2022-08-16 PROCEDURE — 82140 ASSAY OF AMMONIA: CPT | Performed by: STUDENT IN AN ORGANIZED HEALTH CARE EDUCATION/TRAINING PROGRAM

## 2022-08-16 PROCEDURE — 71275 CT ANGIOGRAPHY CHEST: CPT

## 2022-08-16 RX ORDER — SODIUM CHLORIDE 0.9 % (FLUSH) 0.9 %
10 SYRINGE (ML) INJECTION AS NEEDED
Status: DISCONTINUED | OUTPATIENT
Start: 2022-08-16 | End: 2022-08-17 | Stop reason: HOSPADM

## 2022-08-16 RX ADMIN — IOPAMIDOL 140 ML: 755 INJECTION, SOLUTION INTRAVENOUS at 19:28

## 2022-08-16 NOTE — ED NOTES
Code stroke called at this time per Dr Hartmann. Pt transported to CT by [facility] stroke team.
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
## 2022-08-17 NOTE — DISCHARGE INSTRUCTIONS
Apply ice to the scalp in addition to Tylenol or ibuprofen as needed.    Decrease alcohol consumption or follow fall precautions to prevent from incidental trauma

## 2022-10-23 PROCEDURE — 93296 REM INTERROG EVL PM/IDS: CPT | Performed by: STUDENT IN AN ORGANIZED HEALTH CARE EDUCATION/TRAINING PROGRAM

## 2022-10-23 PROCEDURE — 93294 REM INTERROG EVL PM/LDLS PM: CPT | Performed by: STUDENT IN AN ORGANIZED HEALTH CARE EDUCATION/TRAINING PROGRAM

## 2022-11-02 ENCOUNTER — TELEPHONE (OUTPATIENT)
Dept: CARDIOLOGY | Facility: CLINIC | Age: 62
End: 2022-11-02

## 2022-11-02 NOTE — TELEPHONE ENCOUNTER
Caller: Lian Kang    Relationship to patient: Self    Best call back number: [phone]    Patient is needing: PT IS WANTING TO KNOW IF SHE NEEDS LABS BEFORE NEXT APPT.

## 2022-12-08 ENCOUNTER — TELEPHONE (OUTPATIENT)
Dept: CARDIOLOGY | Facility: CLINIC | Age: 62
End: 2022-12-08

## 2022-12-08 NOTE — TELEPHONE ENCOUNTER
Called pt advise her to have her lipid panel done prior to her visit. She expressed understanding and will have it done at her PCP office I will fax the order to them.

## 2023-01-12 ENCOUNTER — TELEPHONE (OUTPATIENT)
Dept: CARDIOLOGY | Facility: CLINIC | Age: 63
End: 2023-01-12
Payer: COMMERCIAL

## 2023-01-12 NOTE — TELEPHONE ENCOUNTER
Called patient to let her know that her beside home monitor didn't send in its scheduled reading because its not connecting. Patient's monitor is being connected using her landline. Tried to troubleshoot the monitor but was unsuccessful.

## 2023-01-22 PROCEDURE — 93294 REM INTERROG EVL PM/LDLS PM: CPT | Performed by: STUDENT IN AN ORGANIZED HEALTH CARE EDUCATION/TRAINING PROGRAM

## 2023-01-22 PROCEDURE — 93296 REM INTERROG EVL PM/IDS: CPT | Performed by: STUDENT IN AN ORGANIZED HEALTH CARE EDUCATION/TRAINING PROGRAM

## 2023-02-06 ENCOUNTER — TELEPHONE (OUTPATIENT)
Dept: CARDIOLOGY | Facility: CLINIC | Age: 63
End: 2023-02-06
Payer: COMMERCIAL

## 2023-02-15 PROBLEM — I20.0 UNSTABLE ANGINA: Status: RESOLVED | Noted: 2020-04-01 | Resolved: 2023-02-15

## 2023-02-15 PROBLEM — R07.2 PRECORDIAL CHEST PAIN: Status: RESOLVED | Noted: 2020-04-16 | Resolved: 2023-02-15

## 2023-02-15 PROBLEM — R55 SYNCOPE AND COLLAPSE: Status: RESOLVED | Noted: 2020-02-03 | Resolved: 2023-02-15

## 2023-03-07 ENCOUNTER — OFFICE VISIT (OUTPATIENT)
Dept: CARDIOLOGY | Facility: CLINIC | Age: 63
End: 2023-03-07
Payer: COMMERCIAL

## 2023-03-07 VITALS
SYSTOLIC BLOOD PRESSURE: 104 MMHG | HEIGHT: 67 IN | HEART RATE: 87 BPM | WEIGHT: 169.8 LBS | DIASTOLIC BLOOD PRESSURE: 69 MMHG | OXYGEN SATURATION: 96 % | BODY MASS INDEX: 26.65 KG/M2

## 2023-03-07 DIAGNOSIS — I48.0 PAROXYSMAL ATRIAL FIBRILLATION: ICD-10-CM

## 2023-03-07 DIAGNOSIS — Z79.899 POLYPHARMACY: ICD-10-CM

## 2023-03-07 DIAGNOSIS — G47.33 OSA (OBSTRUCTIVE SLEEP APNEA): ICD-10-CM

## 2023-03-07 DIAGNOSIS — R07.2 PRECORDIAL CHEST PAIN: ICD-10-CM

## 2023-03-07 DIAGNOSIS — R42 DIZZINESS: Primary | ICD-10-CM

## 2023-03-07 DIAGNOSIS — R06.02 SHORTNESS OF BREATH: ICD-10-CM

## 2023-03-07 DIAGNOSIS — I10 ESSENTIAL HYPERTENSION: ICD-10-CM

## 2023-03-07 DIAGNOSIS — E78.2 MIXED HYPERLIPIDEMIA: ICD-10-CM

## 2023-03-07 DIAGNOSIS — Z95.0 PRESENCE OF CARDIAC PACEMAKER: ICD-10-CM

## 2023-03-07 PROCEDURE — 93000 ELECTROCARDIOGRAM COMPLETE: CPT | Performed by: SPECIALIST

## 2023-03-07 PROCEDURE — 99214 OFFICE O/P EST MOD 30 MIN: CPT | Performed by: SPECIALIST

## 2023-03-07 RX ORDER — LISINOPRIL 5 MG/1
1 TABLET ORAL DAILY
COMMUNITY
Start: 2023-02-18 | End: 2023-03-07 | Stop reason: SDUPTHER

## 2023-03-07 RX ORDER — ISOSORBIDE MONONITRATE 30 MG/1
30 TABLET, EXTENDED RELEASE ORAL DAILY
Qty: 90 TABLET | Refills: 1 | Status: SHIPPED | OUTPATIENT
Start: 2023-03-07

## 2023-03-07 RX ORDER — LISINOPRIL 5 MG/1
5 TABLET ORAL DAILY
Qty: 90 TABLET | Refills: 1 | Status: SHIPPED | OUTPATIENT
Start: 2023-03-07

## 2023-03-07 RX ORDER — NITROGLYCERIN 0.4 MG/1
0.4 TABLET SUBLINGUAL
Qty: 90 TABLET | Refills: 1 | Status: SHIPPED | OUTPATIENT
Start: 2023-03-07

## 2023-03-07 RX ORDER — POTASSIUM CHLORIDE 750 MG/1
10 TABLET, EXTENDED RELEASE ORAL DAILY
Qty: 90 TABLET | Refills: 1 | Status: SHIPPED | OUTPATIENT
Start: 2023-03-07

## 2023-03-07 RX ORDER — ROSUVASTATIN CALCIUM 40 MG/1
40 TABLET, COATED ORAL DAILY
Qty: 90 TABLET | Refills: 1 | Status: SHIPPED | OUTPATIENT
Start: 2023-03-07 | End: 2023-03-07 | Stop reason: SDUPTHER

## 2023-03-07 RX ORDER — ROSUVASTATIN CALCIUM 40 MG/1
40 TABLET, COATED ORAL DAILY
Qty: 90 TABLET | Refills: 1 | Status: SHIPPED | OUTPATIENT
Start: 2023-03-07

## 2023-03-07 RX ORDER — METOPROLOL SUCCINATE 50 MG/1
50 TABLET, EXTENDED RELEASE ORAL DAILY
Qty: 90 TABLET | Refills: 1 | Status: SHIPPED | OUTPATIENT
Start: 2023-03-07

## 2023-03-07 NOTE — PROGRESS NOTES
Subjective   Follow up, pacemaker, PAF  Lian Kang is a 62 y.o. female who presents to day for Follow-up (ROUTINE) and Loss of Consciousness (BEEN TO [facility]).    CHIEF COMPLIANT  Chief Complaint   Patient presents with   • Follow-up     ROUTINE   • Loss of Consciousness     BEEN TO [facility]       Active Problems:  Problem List Items Addressed This Visit        Advance Directives and General Issues    Polypharmacy       Cardiac and Vasculature    Presence of cardiac pacemaker    Overview     · [place] dual-chamber permanent pacemaker, 3/7/2016         Essential hypertension    Relevant Medications    potassium chloride (K-DUR,KLOR-CON) 10 MEQ CR tablet    metoprolol succinate XL (TOPROL-XL) 50 MG 24 hr tablet    lisinopril (PRINIVIL,ZESTRIL) 5 MG tablet    isosorbide mononitrate (IMDUR) 30 MG 24 hr tablet    Mixed hyperlipidemia    Relevant Medications    rosuvastatin (CRESTOR) 40 MG tablet    Other Relevant Orders    Lipid Panel    Comprehensive Metabolic Panel    Paroxysmal atrial fibrillation (HCC)    Relevant Medications    rivaroxaban (Xarelto) 20 MG tablet    nitroglycerin (NITROSTAT) 0.4 MG SL tablet    metoprolol succinate XL (TOPROL-XL) 50 MG 24 hr tablet    isosorbide mononitrate (IMDUR) 30 MG 24 hr tablet    Other Relevant Orders    Adult Transthoracic Echo Complete w/ Color, Spectral and Contrast if necessary per protocol    CBC (No Diff)       Pulmonary and Pneumonias    Shortness of breath       Sleep    AMANDEEP (obstructive sleep apnea)    Relevant Orders    Ambulatory Referral to Pulmonology       Symptoms and Signs    Dizziness - Primary   Other Visit Diagnoses     Precordial chest pain        Relevant Medications    nitroglycerin (NITROSTAT) 0.4 MG SL tablet          HPI  HPI  Patient still having episodes of dizziness and passing out as she had 1 episode of passing out back on the full with injury to her left foot and ankle no significant edema but she has worsening shortness of breath  and occasional palpitations  PRIOR MEDS  Current Outpatient Medications on File Prior to Visit   Medication Sig Dispense Refill   • albuterol sulfate  (90 Base) MCG/ACT inhaler Inhale 2 puffs 4 (Four) Times a Day As Needed for Wheezing.     • bromocriptine (PARLODEL) 2.5 MG tablet Take 1 tablet by mouth Daily.     • busPIRone (BUSPAR) 10 MG tablet Take 1 tablet by mouth 3 (Three) Times a Day As Needed (anxiety).     • cetirizine (zyrTEC) 10 MG tablet Take 1 tablet by mouth Daily.     • clonazePAM (KlonoPIN) 0.5 MG tablet Take 0.25 mg by mouth 3 (Three) Times a Day As Needed for Anxiety.     • docusate sodium (COLACE) 100 MG capsule Take 1 capsule by mouth 2 (Two) Times a Day As Needed for Constipation.     • escitalopram (LEXAPRO) 10 MG tablet Take 1 tablet by mouth Daily.     • famotidine (PEPCID) 20 MG tablet Daily.     • fluticasone (FLONASE) 50 MCG/ACT nasal spray 2 sprays into the nostril(s) as directed by provider Daily.     • furosemide (LASIX) 20 MG tablet Take 1 tablet by mouth Daily. 90 tablet 1   • HYDROcodone-acetaminophen (NORCO) 5-325 MG per tablet Take 1 tablet by mouth Every 6 (Six) Hours As Needed for Moderate Pain.     • levalbuterol (XOPENEX HFA) 45 MCG/ACT inhaler Inhale 1-2 puffs Every 4 (Four) Hours As Needed for Wheezing.     • magnesium oxide (MAGOX) 400 (241.3 Mg) MG tablet tablet Take 1 tablet by mouth 2 (Two) Times a Day.     • metFORMIN (GLUCOPHAGE) 500 MG tablet Take 1 tablet by mouth Daily With Breakfast.     • pantoprazole (PROTONIX) 40 MG EC tablet Take 1 tablet by mouth Daily.     • promethazine (PHENERGAN) 25 MG tablet Take 1 tablet by mouth 3 (Three) Times a Day As Needed for Nausea or Vomiting.     • sertraline (ZOLOFT) 100 MG tablet Take 1 tablet by mouth Daily. (Patient taking differently: Take 1.5 tablets by mouth Daily. Take 1 & 1/2 tab of 100mg per dose) 90 tablet 1   • tiZANidine (ZANAFLEX) 4 MG tablet Take 1 tablet by mouth At Night As Needed for Muscle Spasms.     •  topiramate (TOPAMAX) 100 MG tablet Take 1 tablet by mouth Daily.     • traZODone (DESYREL) 50 MG tablet Take 1 tablet by mouth 2 (Two) Times a Day.     • Umeclidinium Bromide (INCRUSE ELLIPTA) 62.5 MCG/INH aerosol powder  Inhale 1 puff Daily.     • [DISCONTINUED] isosorbide mononitrate (IMDUR) 30 MG 24 hr tablet Take 1 tablet by mouth Daily. 90 tablet 1   • [DISCONTINUED] lisinopril (PRINIVIL,ZESTRIL) 5 MG tablet Take 1 tablet by mouth Daily.     • [DISCONTINUED] nitroglycerin (NITROSTAT) 0.4 MG SL tablet Place 1 tablet under the tongue Every 5 (Five) Minutes As Needed for Chest Pain. Take no more than 3 doses in 15 minutes. 90 tablet 1   • [DISCONTINUED] rosuvastatin (CRESTOR) 20 MG tablet Take 1 tablet by mouth Every Evening. 90 tablet 1   • gabapentin (NEURONTIN) 300 MG capsule Take 1 capsule by mouth 3 (Three) Times a Day.     • methocarbamol (ROBAXIN) 500 MG tablet Take 1 tablet by mouth 3 (Three) Times a Day As Needed for Muscle Spasms.     • polyethylene glycol (MIRALAX) packet Take 17 g by mouth Daily.     • raNITIdine (ZANTAC) 150 MG tablet Take 1 tablet by mouth Every Evening.     • [DISCONTINUED] metoprolol succinate XL (TOPROL-XL) 50 MG 24 hr tablet Take 1 tablet by mouth Daily. 90 tablet 1   • [DISCONTINUED] potassium chloride (K-DUR,KLOR-CON) 10 MEQ CR tablet Take 1 tablet by mouth Daily. 90 tablet 1   • [DISCONTINUED] rivaroxaban (Xarelto) 20 MG tablet Take 1 tablet by mouth Daily. 90 tablet 1     No current facility-administered medications on file prior to visit.       ALLERGIES  Patient has no known allergies.    HISTORY  Past Medical History:   Diagnosis Date   • Anxiety and depression    • Atypical chest pain    • Diabetes mellitus (HCC)    • Dyslipidemia    • GERD (gastroesophageal reflux disease)    • H/O valvular heart disease     Echocardiogram 2013 revealing EF 60%. Mild left atrial enlargement, trace AI, trace MR, mild TR.   • Hypertension    • Insomnia    • Migraine headache     Chronic  "migraine headaches/Topamax therapy.    • Moderate obesity    • AMANDEEP on CPAP    • Pituitary adenoma (HCC)     History of pituitary adenoma/bromocriptine therapy.    • Sinus bradycardia     Severe symptomatic bradycardia secondary to sinus node dysfunction and sinus bradycardia with pacemaker at elective replacement indicator.   • SVT (supraventricular tachycardia) (HCC)     With prior ablation.       Social History     Socioeconomic History   • Marital status:    Tobacco Use   • Smoking status: Former     Packs/day: 0.25     Types: Cigarettes   • Smokeless tobacco: Never   Substance and Sexual Activity   • Alcohol use: No   • Drug use: No   • Sexual activity: Defer       Family History   Problem Relation Age of Onset   • Heart disease Mother    • Heart failure Mother    • Heart disease Father    • Heart failure Father        Review of Systems   Respiratory: Positive for shortness of breath. Negative for apnea, cough, choking, chest tightness, wheezing and stridor.    Cardiovascular: Positive for palpitations. Negative for chest pain and leg swelling.   Neurological: Negative for syncope.       Objective     VITALS: /69   Pulse 87   Ht 170.2 cm (67\")   Wt 77 kg (169 lb 12.8 oz)   LMP  (LMP Unknown)   SpO2 96%   BMI 26.59 kg/m²     LABS:   Lab Results (most recent)     None          IMAGING:   No Images in the past 120 days found..    EXAM:  Physical Exam  Vitals reviewed.   Constitutional:       Appearance: She is well-developed.   HENT:      Head: Normocephalic and atraumatic.   Eyes:      Pupils: Pupils are equal, round, and reactive to light.   Neck:      Thyroid: No thyromegaly.      Vascular: No JVD.   Cardiovascular:      Rate and Rhythm: Normal rate and regular rhythm.      Heart sounds: Normal heart sounds. No murmur heard.    No friction rub. No gallop.      Comments: Pacer pocket clean  Pulmonary:      Effort: Pulmonary effort is normal. No respiratory distress.      Breath sounds: Normal " breath sounds. No stridor. No wheezing or rales.   Chest:      Chest wall: No tenderness.   Musculoskeletal:         General: No tenderness or deformity.      Cervical back: Neck supple.   Skin:     General: Skin is warm and dry.   Neurological:      Mental Status: She is alert and oriented to person, place, and time.      Cranial Nerves: No cranial nerve deficit.      Coordination: Coordination normal.         Procedure     ECG 12 Lead    Date/Time: 3/7/2023 11:54 AM  Performed by: Anthony Faustin MD  Authorized by: Anthony Faustin MD           EKG: Paced atrial responses with diffuse nonspecific T wave inversion consistent with memory T wave versus ischemia versus LVH strain comparing with the EKG on May 3, 2022 no significant change       Assessment & Plan     Diagnoses and all orders for this visit:    1. Dizziness (Primary)    2. Essential hypertension  -     potassium chloride (K-DUR,KLOR-CON) 10 MEQ CR tablet; Take 1 tablet by mouth Daily.  Dispense: 90 tablet; Refill: 1  -     metoprolol succinate XL (TOPROL-XL) 50 MG 24 hr tablet; Take 1 tablet by mouth Daily.  Dispense: 90 tablet; Refill: 1  -     lisinopril (PRINIVIL,ZESTRIL) 5 MG tablet; Take 1 tablet by mouth Daily.  Dispense: 90 tablet; Refill: 1  -     isosorbide mononitrate (IMDUR) 30 MG 24 hr tablet; Take 1 tablet by mouth Daily.  Dispense: 90 tablet; Refill: 1    3. Mixed hyperlipidemia  -     Discontinue: rosuvastatin (CRESTOR) 40 MG tablet; Take 1 tablet by mouth Daily.  Dispense: 90 tablet; Refill: 1  -     rosuvastatin (CRESTOR) 40 MG tablet; Take 1 tablet by mouth Daily.  Dispense: 90 tablet; Refill: 1  -     Lipid Panel; Future  -     Comprehensive Metabolic Panel; Future    4. Paroxysmal atrial fibrillation (HCC)  -     rivaroxaban (Xarelto) 20 MG tablet; Take 1 tablet by mouth Daily.  Dispense: 90 tablet; Refill: 1  -     Adult Transthoracic Echo Complete w/ Color, Spectral and Contrast if necessary per protocol; Future  -     CBC (No Diff);  Future    5. Presence of cardiac pacemaker    6. AMANDEEP (obstructive sleep apnea)  -     Ambulatory Referral to Pulmonology    7. Polypharmacy    8. Precordial chest pain  -     nitroglycerin (NITROSTAT) 0.4 MG SL tablet; Place 1 tablet under the tongue Every 5 (Five) Minutes As Needed for Chest Pain. Take no more than 3 doses in 15 minutes.  Dispense: 90 tablet; Refill: 1    9. Shortness of breath    Other orders  -     ECG 12 Lead    1.  She still having dizziness and 1 episode of syncope she is taking a lot of medications which can potentially affect her blood pressure actually blood pressure is relatively low I strongly advised her to discuss with her primary care physician of stopping medications which she can be held interrogation of the pacemaker in January showed no significant arrhythmia to explain her dizziness she had an 1% on last of atrial fibrillation burden with normal function of the pacemaker  2.  As she is a little bit short of breath I am going to repeat the echocardiogram for assessment of her cardiac function  3.  I reviewed her labs with elevated LDL of 156 and HDL is 46 she said that she is compliant with the rosuvastatin so I am going to increase the dose to 40 mg  4.  Currently she is in sinus rhythm we will continue with the Xarelto  5.  Unfortunately she is not eating as using the CPAP I strongly advised her to restart using I refer her to pulmonology for follow-up    Return in about 3 months (around 6/7/2023).                 MEDS ORDERED DURING VISIT:  New Medications Ordered This Visit   Medications   • rosuvastatin (CRESTOR) 40 MG tablet     Sig: Take 1 tablet by mouth Daily.     Dispense:  90 tablet     Refill:  1   • rivaroxaban (Xarelto) 20 MG tablet     Sig: Take 1 tablet by mouth Daily.     Dispense:  90 tablet     Refill:  1   • potassium chloride (K-DUR,KLOR-CON) 10 MEQ CR tablet     Sig: Take 1 tablet by mouth Daily.     Dispense:  90 tablet     Refill:  1   • nitroglycerin  (NITROSTAT) 0.4 MG SL tablet     Sig: Place 1 tablet under the tongue Every 5 (Five) Minutes As Needed for Chest Pain. Take no more than 3 doses in 15 minutes.     Dispense:  90 tablet     Refill:  1   • metoprolol succinate XL (TOPROL-XL) 50 MG 24 hr tablet     Sig: Take 1 tablet by mouth Daily.     Dispense:  90 tablet     Refill:  1   • lisinopril (PRINIVIL,ZESTRIL) 5 MG tablet     Sig: Take 1 tablet by mouth Daily.     Dispense:  90 tablet     Refill:  1   • isosorbide mononitrate (IMDUR) 30 MG 24 hr tablet     Sig: Take 1 tablet by mouth Daily.     Dispense:  90 tablet     Refill:  1       As always, Ernesto Hurst APRN  I appreciate very much the opportunity to participate in the cardiovascular care of your patients. Please do not hesitate to call me with any questions with regards to Lian Howards evaluation and management.         This document has been electronically signed by Anthony Faustin MD  March 7, 2023 12:31 EST    This note is dictated utilizing voice recognition software.

## 2023-03-18 ENCOUNTER — HOSPITAL ENCOUNTER (EMERGENCY)
Facility: HOSPITAL | Age: 63
Discharge: HOME | End: 2023-03-18
Attending: STUDENT IN AN ORGANIZED HEALTH CARE EDUCATION/TRAINING PROGRAM | Admitting: STUDENT IN AN ORGANIZED HEALTH CARE EDUCATION/TRAINING PROGRAM
Payer: COMMERCIAL

## 2023-03-18 ENCOUNTER — APPOINTMENT (OUTPATIENT)
Dept: GENERAL RADIOLOGY | Facility: HOSPITAL | Age: 63
End: 2023-03-18
Payer: COMMERCIAL

## 2023-03-18 DIAGNOSIS — R42 POSTURAL DIZZINESS WITH PRESYNCOPE: ICD-10-CM

## 2023-03-18 DIAGNOSIS — R55 POSTURAL DIZZINESS WITH PRESYNCOPE: ICD-10-CM

## 2023-03-18 DIAGNOSIS — J18.9 PNEUMONIA OF LEFT LOWER LOBE DUE TO INFECTIOUS ORGANISM: Primary | ICD-10-CM

## 2023-03-18 LAB
ALBUMIN SERPL-MCNC: 4.2 G/DL (ref 3.5–5.2)
ALBUMIN/GLOB SERPL: 2.3 G/DL
ALP SERPL-CCNC: 60 U/L (ref 39–117)
ALT SERPL W P-5'-P-CCNC: 10 U/L (ref 1–33)
AMPHET+METHAMPHET UR QL: NEGATIVE
AMPHETAMINES UR QL: NEGATIVE
ANION GAP SERPL CALCULATED.3IONS-SCNC: 10.9 MMOL/L (ref 5–15)
AST SERPL-CCNC: 15 U/L (ref 1–32)
BARBITURATES UR QL SCN: NEGATIVE
BASOPHILS # BLD AUTO: 0.08 10*3/MM3 (ref 0–0.2)
BASOPHILS NFR BLD AUTO: 1.2 % (ref 0–1.5)
BENZODIAZ UR QL SCN: NEGATIVE
BILIRUB SERPL-MCNC: 0.3 MG/DL (ref 0–1.2)
BILIRUB UR QL STRIP: NEGATIVE
BUN SERPL-MCNC: 12 MG/DL (ref 8–23)
BUN/CREAT SERPL: 13.5 (ref 7–25)
BUPRENORPHINE SERPL-MCNC: NEGATIVE NG/ML
CALCIUM SPEC-SCNC: 9.3 MG/DL (ref 8.6–10.5)
CANNABINOIDS SERPL QL: NEGATIVE
CHLORIDE SERPL-SCNC: 106 MMOL/L (ref 98–107)
CLARITY UR: CLEAR
CO2 SERPL-SCNC: 22.1 MMOL/L (ref 22–29)
COCAINE UR QL: NEGATIVE
COLOR UR: YELLOW
CREAT SERPL-MCNC: 0.89 MG/DL (ref 0.57–1)
DEPRECATED RDW RBC AUTO: 44.3 FL (ref 37–54)
EGFRCR SERPLBLD CKD-EPI 2021: 73 ML/MIN/1.73
EOSINOPHIL # BLD AUTO: 0.14 10*3/MM3 (ref 0–0.4)
EOSINOPHIL NFR BLD AUTO: 2.1 % (ref 0.3–6.2)
ERYTHROCYTE [DISTWIDTH] IN BLOOD BY AUTOMATED COUNT: 13.2 % (ref 12.3–15.4)
ETHANOL BLD-MCNC: <10 MG/DL (ref 0–10)
ETHANOL UR QL: <0.01 %
GEN 5 2HR TROPONIN T REFLEX: <6 NG/L
GLOBULIN UR ELPH-MCNC: 1.8 GM/DL
GLUCOSE SERPL-MCNC: 112 MG/DL (ref 65–99)
GLUCOSE UR STRIP-MCNC: NEGATIVE MG/DL
HCT VFR BLD AUTO: 40.3 % (ref 34–46.6)
HGB BLD-MCNC: 13 G/DL (ref 12–15.9)
HGB UR QL STRIP.AUTO: NEGATIVE
HOLD SPECIMEN: NORMAL
HOLD SPECIMEN: NORMAL
IMM GRANULOCYTES # BLD AUTO: 0.02 10*3/MM3 (ref 0–0.05)
IMM GRANULOCYTES NFR BLD AUTO: 0.3 % (ref 0–0.5)
KETONES UR QL STRIP: NEGATIVE
LEUKOCYTE ESTERASE UR QL STRIP.AUTO: NEGATIVE
LYMPHOCYTES # BLD AUTO: 2.3 10*3/MM3 (ref 0.7–3.1)
LYMPHOCYTES NFR BLD AUTO: 34.2 % (ref 19.6–45.3)
MCH RBC QN AUTO: 29.4 PG (ref 26.6–33)
MCHC RBC AUTO-ENTMCNC: 32.3 G/DL (ref 31.5–35.7)
MCV RBC AUTO: 91.2 FL (ref 79–97)
METHADONE UR QL SCN: NEGATIVE
MONOCYTES # BLD AUTO: 0.43 10*3/MM3 (ref 0.1–0.9)
MONOCYTES NFR BLD AUTO: 6.4 % (ref 5–12)
NEUTROPHILS NFR BLD AUTO: 3.76 10*3/MM3 (ref 1.7–7)
NEUTROPHILS NFR BLD AUTO: 55.8 % (ref 42.7–76)
NITRITE UR QL STRIP: NEGATIVE
NRBC BLD AUTO-RTO: 0 /100 WBC (ref 0–0.2)
NT-PROBNP SERPL-MCNC: 169.7 PG/ML (ref 0–900)
OPIATES UR QL: NEGATIVE
OXYCODONE UR QL SCN: NEGATIVE
PCP UR QL SCN: NEGATIVE
PH UR STRIP.AUTO: 7 [PH] (ref 5–8)
PLATELET # BLD AUTO: 195 10*3/MM3 (ref 140–450)
PMV BLD AUTO: 9.3 FL (ref 6–12)
POTASSIUM SERPL-SCNC: 3.4 MMOL/L (ref 3.5–5.2)
PROPOXYPH UR QL: NEGATIVE
PROT SERPL-MCNC: 6 G/DL (ref 6–8.5)
PROT UR QL STRIP: NEGATIVE
RBC # BLD AUTO: 4.42 10*6/MM3 (ref 3.77–5.28)
SODIUM SERPL-SCNC: 139 MMOL/L (ref 136–145)
SP GR UR STRIP: 1.01 (ref 1–1.03)
TRICYCLICS UR QL SCN: NEGATIVE
TROPONIN T DELTA: NORMAL
TROPONIN T SERPL HS-MCNC: <6 NG/L
UROBILINOGEN UR QL STRIP: NORMAL
WBC NRBC COR # BLD: 6.73 10*3/MM3 (ref 3.4–10.8)
WHOLE BLOOD HOLD COAG: NORMAL
WHOLE BLOOD HOLD SPECIMEN: NORMAL

## 2023-03-18 PROCEDURE — 83880 ASSAY OF NATRIURETIC PEPTIDE: CPT | Performed by: STUDENT IN AN ORGANIZED HEALTH CARE EDUCATION/TRAINING PROGRAM

## 2023-03-18 PROCEDURE — 80053 COMPREHEN METABOLIC PANEL: CPT | Performed by: STUDENT IN AN ORGANIZED HEALTH CARE EDUCATION/TRAINING PROGRAM

## 2023-03-18 PROCEDURE — 81003 URINALYSIS AUTO W/O SCOPE: CPT | Performed by: STUDENT IN AN ORGANIZED HEALTH CARE EDUCATION/TRAINING PROGRAM

## 2023-03-18 PROCEDURE — 99285 EMERGENCY DEPT VISIT HI MDM: CPT

## 2023-03-18 PROCEDURE — 84484 ASSAY OF TROPONIN QUANT: CPT | Performed by: STUDENT IN AN ORGANIZED HEALTH CARE EDUCATION/TRAINING PROGRAM

## 2023-03-18 PROCEDURE — 63710000001 PREDNISONE PER 1 MG: Performed by: STUDENT IN AN ORGANIZED HEALTH CARE EDUCATION/TRAINING PROGRAM

## 2023-03-18 PROCEDURE — 93005 ELECTROCARDIOGRAM TRACING: CPT | Performed by: STUDENT IN AN ORGANIZED HEALTH CARE EDUCATION/TRAINING PROGRAM

## 2023-03-18 PROCEDURE — 80306 DRUG TEST PRSMV INSTRMNT: CPT | Performed by: STUDENT IN AN ORGANIZED HEALTH CARE EDUCATION/TRAINING PROGRAM

## 2023-03-18 PROCEDURE — 36415 COLL VENOUS BLD VENIPUNCTURE: CPT

## 2023-03-18 PROCEDURE — 84436 ASSAY OF TOTAL THYROXINE: CPT | Performed by: STUDENT IN AN ORGANIZED HEALTH CARE EDUCATION/TRAINING PROGRAM

## 2023-03-18 PROCEDURE — 82077 ASSAY SPEC XCP UR&BREATH IA: CPT | Performed by: STUDENT IN AN ORGANIZED HEALTH CARE EDUCATION/TRAINING PROGRAM

## 2023-03-18 PROCEDURE — 71045 X-RAY EXAM CHEST 1 VIEW: CPT

## 2023-03-18 PROCEDURE — 85025 COMPLETE CBC W/AUTO DIFF WBC: CPT | Performed by: STUDENT IN AN ORGANIZED HEALTH CARE EDUCATION/TRAINING PROGRAM

## 2023-03-18 RX ORDER — DOXYCYCLINE 100 MG/1
100 CAPSULE ORAL 2 TIMES DAILY
Qty: 10 CAPSULE | Refills: 0 | Status: SHIPPED | OUTPATIENT
Start: 2023-03-18 | End: 2023-03-23

## 2023-03-18 RX ORDER — PREDNISONE 20 MG/1
40 TABLET ORAL ONCE
Status: COMPLETED | OUTPATIENT
Start: 2023-03-18 | End: 2023-03-18

## 2023-03-18 RX ORDER — AMOXICILLIN AND CLAVULANATE POTASSIUM 875; 125 MG/1; MG/1
1 TABLET, FILM COATED ORAL ONCE
Status: COMPLETED | OUTPATIENT
Start: 2023-03-18 | End: 2023-03-18

## 2023-03-18 RX ORDER — SODIUM CHLORIDE 0.9 % (FLUSH) 0.9 %
10 SYRINGE (ML) INJECTION AS NEEDED
Status: DISCONTINUED | OUTPATIENT
Start: 2023-03-18 | End: 2023-03-18 | Stop reason: HOSPADM

## 2023-03-18 RX ORDER — AMOXICILLIN AND CLAVULANATE POTASSIUM 875; 125 MG/1; MG/1
1 TABLET, FILM COATED ORAL 2 TIMES DAILY
Qty: 14 TABLET | Refills: 0 | Status: SHIPPED | OUTPATIENT
Start: 2023-03-18 | End: 2023-03-25

## 2023-03-18 RX ORDER — PREDNISONE 20 MG/1
40 TABLET ORAL DAILY
Qty: 10 TABLET | Refills: 0 | Status: SHIPPED | OUTPATIENT
Start: 2023-03-18 | End: 2023-03-23

## 2023-03-18 RX ORDER — DOXYCYCLINE 100 MG/1
100 CAPSULE ORAL ONCE
Status: COMPLETED | OUTPATIENT
Start: 2023-03-18 | End: 2023-03-18

## 2023-03-18 RX ORDER — ASPIRIN 81 MG/1
324 TABLET, CHEWABLE ORAL ONCE
Status: COMPLETED | OUTPATIENT
Start: 2023-03-18 | End: 2023-03-18

## 2023-03-18 RX ADMIN — DOXYCYCLINE 100 MG: 100 CAPSULE ORAL at 19:51

## 2023-03-18 RX ADMIN — AMOXICILLIN AND CLAVULANATE POTASSIUM 1 TABLET: 875; 125 TABLET, FILM COATED ORAL at 19:51

## 2023-03-18 RX ADMIN — ASPIRIN 324 MG: 81 TABLET, CHEWABLE ORAL at 18:33

## 2023-03-18 RX ADMIN — PREDNISONE 40 MG: 20 TABLET ORAL at 19:51

## 2023-03-18 NOTE — ED PROVIDER NOTES
Daily Note     Today's date: 2021  Patient name: Ximena Martin  : 1958  MRN: [id number]  Referring provider: Elton Huerta  Dx:   Encounter Diagnosis     ICD-10-CM    1  Tear of right rotator cuff, unspecified tear extent, unspecified whether traumatic  M75 101    2  Right shoulder pain, unspecified chronicity  M25 511    3  Posture abnormality  R29 3        Start Time: 1115  Stop Time: 1200  Total time in clinic (min): 45 minutes    Subjective: Patient states shoulder aches a little, sling gets a little uncomfortable  Overall is surprised how little pain she has  Objective: See treatment diary below      Assessment: Tolerated treatment well  Continual verbal cueing to relax arm due to excessive guarding  Tightness of right elbow extension, instructed patient on supine prolonged elbow ext stretch  Overall showing good progress towards goals with increased right shoulder PROM since last session  Patient would benefit from continued PT      Plan: Continue per plan of care  Progress treatment as tolerated         Precautions: no AROM 6 weeks  Progress note:   POC:     Manuals      Stretching with active release  *15 min 15 min     GH jt mobs        Scapular PNF        IASTM        Wound care Dressing change 10       Neuro Re-Ed        UBE        scap retraction reviewed 20x 20x     Shoulder rolls reviewed 20x 20x     Chin tucks  *:05x10 :05x10                     Ther Ex         reviewed *red 20x red 30x     UT stretch  *3x:30 3x:30     LS stretch  3x:30 3x:30     Table slides  *flex 10x:10 Flex, scap 10x:10     pendulums reviewed *:30 ea :30 ea     Supine flexion ROM   *unable to relax     Wrist AROM   10x ea                                                                                                     Ther Activity                        Gait Training                        Modalities                          Access Code: [email or web address]  URL: Subjective   History of Present Illness  Patient is a 62-year-old female with history significant for type 2 diabetes, sick sinus syndrome status post PPM who comes to the ER with dizziness, presyncope and fatigue.  She has been seen outpatient has had these complaints for quite some time and follows with cardiology.  Syncopal work-up has been benign up to this point.  She does report occasional shortness of breath, nonproductive cough but no fevers or chills.  Denies any chest pain, palpitations or symptomatology.  She has no other complaints.        Review of Systems   Constitutional: Positive for fatigue. Negative for chills and fever.   HENT: Negative for ear pain, sinus pain and sore throat.    Respiratory: Positive for cough and shortness of breath. Negative for chest tightness and wheezing.    Cardiovascular: Negative for chest pain, palpitations and leg swelling.   Gastrointestinal: Negative for abdominal pain, constipation, diarrhea, nausea and vomiting.   Genitourinary: Negative for dysuria, hematuria and urgency.   Musculoskeletal: Negative for arthralgias and myalgias.   Neurological: Positive for syncope. Negative for dizziness and light-headedness.   Psychiatric/Behavioral: Negative for confusion.       Past Medical History:   Diagnosis Date   • Anxiety and depression    • Atypical chest pain    • Diabetes mellitus (HCC)    • Dyslipidemia    • GERD (gastroesophageal reflux disease)    • H/O valvular heart disease     Echocardiogram 2013 revealing EF 60%. Mild left atrial enlargement, trace AI, trace MR, mild TR.   • Hypertension    • Insomnia    • Migraine headache     Chronic migraine headaches/Topamax therapy.    • Moderate obesity    • AMANDEEP on CPAP    • Pituitary adenoma (HCC)     History of pituitary adenoma/bromocriptine therapy.    • Sinus bradycardia     Severe symptomatic bradycardia secondary to sinus node dysfunction and sinus bradycardia with pacemaker at elective replacement indicator.   •  SVT (supraventricular tachycardia) (HCC)     With prior ablation.       No Known Allergies    Past Surgical History:   Procedure Laterality Date   • PACEMAKER IMPLANTATION  2010    Apparent reported symptomatic bradycardia on metoprolol therapy/status post dual chamber [place] permanent pacemaker implantation by Dr. Michael Mcknight in 2010.       Family History   Problem Relation Age of Onset   • Heart disease Mother    • Heart failure Mother    • Heart disease Father    • Heart failure Father        Social History     Socioeconomic History   • Marital status:    Tobacco Use   • Smoking status: Former     Packs/day: 0.25     Types: Cigarettes   • Smokeless tobacco: Never   Substance and Sexual Activity   • Alcohol use: No   • Drug use: No   • Sexual activity: Defer           Objective   Physical Exam  Vitals and nursing note reviewed. Exam conducted with a chaperone present.   Constitutional:       Appearance: Normal appearance. She is normal weight.   HENT:      Head: Normocephalic and atraumatic.      Nose: Nose normal.      Mouth/Throat:      Mouth: Mucous membranes are moist.      Pharynx: Oropharynx is clear.   Eyes:      Extraocular Movements: Extraocular movements intact.      Pupils: Pupils are equal, round, and reactive to light.   Cardiovascular:      Pulses: Normal pulses.      Heart sounds: Normal heart sounds.   Pulmonary:      Effort: Pulmonary effort is normal. No respiratory distress.      Breath sounds: Normal breath sounds. No wheezing.   Abdominal:      General: Abdomen is flat. Bowel sounds are normal.      Palpations: Abdomen is soft.   Musculoskeletal:         General: Normal range of motion.      Cervical back: Normal range of motion.   Skin:     General: Skin is warm and dry.      Capillary Refill: Capillary refill takes less than 2 seconds.   Neurological:      General: No focal deficit present.      Mental Status: She is alert and oriented to person, place, and time.   Psychiatric:    [email or web address]  Date: 06/28/2021  Prepared by: Magalis Spencer    Exercises  Standing Backward Shoulder Rolls - 3 x daily - 7 x weekly - 3 sets - 10 reps  Seated Scapular Retraction - 3 x daily - 7 x weekly - 3 sets - 10 reps  Circular Shoulder Pendulum with Table Support - 3 x daily - 7 x weekly - 3 sets - 10 reps  Flexion-Extension Shoulder Pendulum with Table Support - 3 x daily - 7 x weekly - 3 sets - 10 reps  Horizontal Shoulder Pendulum with Table Support - 3 x daily - 7 x weekly - 3 sets - 10 reps  Seated Upper Trapezius Stretch - 3 x daily - 7 x weekly - 3 reps - 1 sets - 30 sec hold  Gentle Levator Scapulae Stretch - 3 x daily - 7 x weekly - 3 reps - 1 sets - 30 sec hold  Seated Shoulder Flexion Towel Slide at Table Top - 3 x daily - 7 x weekly - 10 reps - 1 sets - 5 sec hold  Seated Shoulder Abduction Towel Slide at Table Top - 3 x daily - 7 x weekly - 10 reps - 1 sets - 5 sec hold       Mood and Affect: Mood normal.         Behavior: Behavior normal.         Procedures           ED Course  ED Course as of 03/18/23 1938   Sat Mar 18, 2023   1743 ECG 12 Lead Chest Pain  Dual paced rhythm, rate 75, QTc 502, no acute ST or T wave changes [CW]      ED Course User Index  [CW] Dewey Abad,                                            Medical Decision Making  Patient has remained hemodynamically stable on room air.  Labs overall unremarkable.  Chest x-ray noted left lower lobe infiltrate.  Patient received p.o. antibiotics and steroids.  Discussed with her treatment plan and she is agreeable.  We will plan for Holter monitor as well and outpatient follow-up with cardiology.    Amount and/or Complexity of Data Reviewed  Labs: ordered.  Radiology: ordered.  ECG/medicine tests: ordered. Decision-making details documented in ED Course.      Risk  OTC drugs.  Prescription drug management.          Final diagnoses:   Pneumonia of left lower lobe due to infectious organism   Postural dizziness with presyncope       ED Disposition  ED Disposition     ED Disposition   Discharge    Condition   Stable    Comment   --             Ernesto Hurst, APRN  [address] [facility] [address]  [phone]    Call in 1 week           Medication List      New Prescriptions    amoxicillin-clavulanate 875-125 MG per tablet  Commonly known as: Augmentin  Take 1 tablet by mouth 2 (Two) Times a Day for 7 days.     doxycycline 100 MG capsule  Commonly known as: MONODOX  Take 1 capsule by mouth 2 (Two) Times a Day for 5 days.     predniSONE 20 MG tablet  Commonly known as: DELTASONE  Take 2 tablets by mouth Daily for 5 days.        Changed    sertraline 100 MG tablet  Commonly known as: ZOLOFT  Take 1 tablet by mouth Daily.  What changed:   · how much to take  · additional instructions           Where to Get Your Medications      These medications were sent to [facility] [place], [place] -   [address] - [phone] [facility] [phone] FX  [address], [facility] [address]    Phone: [phone]   · amoxicillin-clavulanate 875-125 MG per tablet  · doxycycline 100 MG capsule  · predniSONE 20 MG tablet          Dewey Abad DO  03/18/23 193

## 2023-03-19 VITALS
WEIGHT: 186 LBS | OXYGEN SATURATION: 96 % | TEMPERATURE: 98.6 F | BODY MASS INDEX: 29.19 KG/M2 | HEIGHT: 67 IN | HEART RATE: 60 BPM | RESPIRATION RATE: 20 BRPM | SYSTOLIC BLOOD PRESSURE: 110 MMHG | DIASTOLIC BLOOD PRESSURE: 80 MMHG

## 2023-03-19 LAB
QT INTERVAL: 450 MS
QTC INTERVAL: 502 MS
T4 SERPL-MCNC: 5.29 MCG/DL (ref 4.5–11.7)

## 2023-03-24 ENCOUNTER — LAB (OUTPATIENT)
Dept: LAB | Facility: HOSPITAL | Age: 63
End: 2023-03-24
Payer: COMMERCIAL

## 2023-03-24 ENCOUNTER — HOSPITAL ENCOUNTER (OUTPATIENT)
Dept: CARDIOLOGY | Facility: HOSPITAL | Age: 63
Discharge: HOME | End: 2023-03-24
Payer: COMMERCIAL

## 2023-03-24 DIAGNOSIS — E78.2 MIXED HYPERLIPIDEMIA: ICD-10-CM

## 2023-03-24 DIAGNOSIS — I48.0 PAROXYSMAL ATRIAL FIBRILLATION: ICD-10-CM

## 2023-03-24 LAB
BH CV ECHO MEAS - AO MAX PG: 5.3 MMHG
BH CV ECHO MEAS - AO MEAN PG: 3 MMHG
BH CV ECHO MEAS - AO ROOT DIAM: 2.9 CM
BH CV ECHO MEAS - AO V2 MAX: 115 CM/SEC
BH CV ECHO MEAS - AO V2 VTI: 24.3 CM
BH CV ECHO MEAS - EDV(CUBED): 91.1 ML
BH CV ECHO MEAS - EDV(MOD-SP4): 39.3 ML
BH CV ECHO MEAS - EF(MOD-SP4): 59.5 %
BH CV ECHO MEAS - ESV(CUBED): 39.3 ML
BH CV ECHO MEAS - ESV(MOD-SP4): 15.9 ML
BH CV ECHO MEAS - FS: 24.4 %
BH CV ECHO MEAS - IVS/LVPW: 1.09 CM
BH CV ECHO MEAS - IVSD: 1.2 CM
BH CV ECHO MEAS - LA DIMENSION: 3.4 CM
BH CV ECHO MEAS - LAT PEAK E' VEL: 7.1 CM/SEC
BH CV ECHO MEAS - LV DIASTOLIC VOL/BSA (35-75): 20 CM2
BH CV ECHO MEAS - LV MASS(C)D: 186.4 GRAMS
BH CV ECHO MEAS - LV SYSTOLIC VOL/BSA (12-30): 8.1 CM2
BH CV ECHO MEAS - LVIDD: 4.5 CM
BH CV ECHO MEAS - LVIDS: 3.4 CM
BH CV ECHO MEAS - LVOT AREA: 2.5 CM2
BH CV ECHO MEAS - LVOT DIAM: 1.8 CM
BH CV ECHO MEAS - LVPWD: 1.1 CM
BH CV ECHO MEAS - MED PEAK E' VEL: 6.9 CM/SEC
BH CV ECHO MEAS - MV A MAX VEL: 61.7 CM/SEC
BH CV ECHO MEAS - MV E MAX VEL: 57.4 CM/SEC
BH CV ECHO MEAS - MV E/A: 0.93
BH CV ECHO MEAS - PA ACC TIME: 0.09 SEC
BH CV ECHO MEAS - PA PR(ACCEL): 39.8 MMHG
BH CV ECHO MEAS - RAP SYSTOLE: 10 MMHG
BH CV ECHO MEAS - RVSP: 34 MMHG
BH CV ECHO MEAS - SI(MOD-SP4): 11.9 ML/M2
BH CV ECHO MEAS - SV(MOD-SP4): 23.4 ML
BH CV ECHO MEAS - TAPSE (>1.6): 1.15 CM
BH CV ECHO MEAS - TR MAX PG: 19.9 MMHG
BH CV ECHO MEAS - TR MAX VEL: 221.7 CM/SEC
BH CV ECHO MEASUREMENTS AVERAGE E/E' RATIO: 8.2
LEFT ATRIUM VOLUME INDEX: 18.4 ML/M2
MAXIMAL PREDICTED HEART RATE: 157 BPM
STRESS TARGET HR: 133 BPM

## 2023-03-24 PROCEDURE — 80053 COMPREHEN METABOLIC PANEL: CPT

## 2023-03-24 PROCEDURE — 85027 COMPLETE CBC AUTOMATED: CPT

## 2023-03-24 PROCEDURE — 36415 COLL VENOUS BLD VENIPUNCTURE: CPT

## 2023-03-24 PROCEDURE — 93306 TTE W/DOPPLER COMPLETE: CPT | Performed by: SPECIALIST

## 2023-03-24 PROCEDURE — 80061 LIPID PANEL: CPT

## 2023-03-24 PROCEDURE — 93306 TTE W/DOPPLER COMPLETE: CPT

## 2023-03-25 LAB
ALBUMIN SERPL-MCNC: 4 G/DL (ref 3.5–5.2)
ALBUMIN/GLOB SERPL: 2.1 G/DL
ALP SERPL-CCNC: 69 U/L (ref 39–117)
ALT SERPL W P-5'-P-CCNC: 13 U/L (ref 1–33)
ANION GAP SERPL CALCULATED.3IONS-SCNC: 9 MMOL/L (ref 5–15)
AST SERPL-CCNC: 15 U/L (ref 1–32)
BILIRUB SERPL-MCNC: 0.2 MG/DL (ref 0–1.2)
BUN SERPL-MCNC: 27 MG/DL (ref 8–23)
BUN/CREAT SERPL: 29 (ref 7–25)
CALCIUM SPEC-SCNC: 9 MG/DL (ref 8.6–10.5)
CHLORIDE SERPL-SCNC: 103 MMOL/L (ref 98–107)
CHOLEST SERPL-MCNC: 140 MG/DL (ref 0–200)
CO2 SERPL-SCNC: 28 MMOL/L (ref 22–29)
CREAT SERPL-MCNC: 0.93 MG/DL (ref 0.57–1)
DEPRECATED RDW RBC AUTO: 41.8 FL (ref 37–54)
EGFRCR SERPLBLD CKD-EPI 2021: 69.2 ML/MIN/1.73
ERYTHROCYTE [DISTWIDTH] IN BLOOD BY AUTOMATED COUNT: 13 % (ref 12.3–15.4)
GLOBULIN UR ELPH-MCNC: 1.9 GM/DL
GLUCOSE SERPL-MCNC: 108 MG/DL (ref 65–99)
HCT VFR BLD AUTO: 40.9 % (ref 34–46.6)
HDLC SERPL-MCNC: 50 MG/DL (ref 40–60)
HGB BLD-MCNC: 13.6 G/DL (ref 12–15.9)
LDLC SERPL CALC-MCNC: 62 MG/DL (ref 0–100)
LDLC/HDLC SERPL: 1.15 {RATIO}
MCH RBC QN AUTO: 29.4 PG (ref 26.6–33)
MCHC RBC AUTO-ENTMCNC: 33.3 G/DL (ref 31.5–35.7)
MCV RBC AUTO: 88.5 FL (ref 79–97)
PLATELET # BLD AUTO: 259 10*3/MM3 (ref 140–450)
PMV BLD AUTO: 10.6 FL (ref 6–12)
POTASSIUM SERPL-SCNC: 3 MMOL/L (ref 3.5–5.2)
PROT SERPL-MCNC: 5.9 G/DL (ref 6–8.5)
RBC # BLD AUTO: 4.62 10*6/MM3 (ref 3.77–5.28)
SODIUM SERPL-SCNC: 140 MMOL/L (ref 136–145)
TRIGL SERPL-MCNC: 163 MG/DL (ref 0–150)
VLDLC SERPL-MCNC: 28 MG/DL (ref 5–40)
WBC NRBC COR # BLD: 10.36 10*3/MM3 (ref 3.4–10.8)

## 2023-03-27 DIAGNOSIS — I48.0 PAROXYSMAL ATRIAL FIBRILLATION: Primary | ICD-10-CM

## 2023-03-27 RX ORDER — POTASSIUM CHLORIDE 20 MEQ/1
20 TABLET, EXTENDED RELEASE ORAL 2 TIMES DAILY
Qty: 4 TABLET | Refills: 1 | Status: SHIPPED | OUTPATIENT
Start: 2023-03-27

## 2023-04-13 ENCOUNTER — CLINICAL SUPPORT NO REQUIREMENTS (OUTPATIENT)
Dept: CARDIOLOGY | Facility: CLINIC | Age: 63
End: 2023-04-13
Payer: COMMERCIAL

## 2023-04-13 DIAGNOSIS — I49.5 SSS (SICK SINUS SYNDROME): Primary | ICD-10-CM

## 2023-06-08 ENCOUNTER — OFFICE VISIT (OUTPATIENT)
Dept: CARDIOLOGY | Facility: CLINIC | Age: 63
End: 2023-06-08
Payer: COMMERCIAL

## 2023-06-08 VITALS
OXYGEN SATURATION: 96 % | SYSTOLIC BLOOD PRESSURE: 156 MMHG | DIASTOLIC BLOOD PRESSURE: 97 MMHG | BODY MASS INDEX: 28.09 KG/M2 | WEIGHT: 179 LBS | HEIGHT: 67 IN | HEART RATE: 68 BPM

## 2023-06-08 DIAGNOSIS — Z95.0 PRESENCE OF CARDIAC PACEMAKER: ICD-10-CM

## 2023-06-08 DIAGNOSIS — I10 ESSENTIAL HYPERTENSION: ICD-10-CM

## 2023-06-08 DIAGNOSIS — I48.0 PAROXYSMAL ATRIAL FIBRILLATION: ICD-10-CM

## 2023-06-08 DIAGNOSIS — I47.1 SVT (SUPRAVENTRICULAR TACHYCARDIA): ICD-10-CM

## 2023-06-08 DIAGNOSIS — G47.33 OSA (OBSTRUCTIVE SLEEP APNEA): ICD-10-CM

## 2023-06-08 DIAGNOSIS — R06.02 SHORTNESS OF BREATH: ICD-10-CM

## 2023-06-08 DIAGNOSIS — R07.2 PRECORDIAL CHEST PAIN: Primary | ICD-10-CM

## 2023-06-08 DIAGNOSIS — E78.2 MIXED HYPERLIPIDEMIA: ICD-10-CM

## 2023-06-08 DIAGNOSIS — Z79.899 POLYPHARMACY: ICD-10-CM

## 2023-06-08 PROCEDURE — 3077F SYST BP >= 140 MM HG: CPT | Performed by: SPECIALIST

## 2023-06-08 PROCEDURE — 99214 OFFICE O/P EST MOD 30 MIN: CPT | Performed by: SPECIALIST

## 2023-06-08 PROCEDURE — 3080F DIAST BP >= 90 MM HG: CPT | Performed by: SPECIALIST

## 2023-06-08 RX ORDER — ROSUVASTATIN CALCIUM 40 MG/1
40 TABLET, COATED ORAL DAILY
Qty: 90 TABLET | Refills: 1 | Status: SHIPPED | OUTPATIENT
Start: 2023-06-08

## 2023-06-08 RX ORDER — LISINOPRIL 5 MG/1
5 TABLET ORAL DAILY
Qty: 90 TABLET | Refills: 1 | Status: SHIPPED | OUTPATIENT
Start: 2023-06-08

## 2023-06-08 RX ORDER — METOPROLOL SUCCINATE 100 MG/1
100 TABLET, EXTENDED RELEASE ORAL DAILY
Qty: 90 TABLET | Refills: 1 | Status: SHIPPED | OUTPATIENT
Start: 2023-06-08

## 2023-06-08 RX ORDER — POTASSIUM CHLORIDE 20 MEQ/1
20 TABLET, EXTENDED RELEASE ORAL 2 TIMES DAILY
Qty: 4 TABLET | Refills: 1 | Status: SHIPPED | OUTPATIENT
Start: 2023-06-08

## 2023-06-08 RX ORDER — NITROGLYCERIN 0.4 MG/1
0.4 TABLET SUBLINGUAL
Qty: 90 TABLET | Refills: 1 | Status: SHIPPED | OUTPATIENT
Start: 2023-06-08

## 2023-06-08 RX ORDER — METOPROLOL SUCCINATE 100 MG/1
100 TABLET, EXTENDED RELEASE ORAL DAILY
Qty: 90 TABLET | Refills: 1 | Status: SHIPPED | OUTPATIENT
Start: 2023-06-08 | End: 2023-06-08 | Stop reason: SDUPTHER

## 2023-06-08 RX ORDER — ISOSORBIDE MONONITRATE 30 MG/1
30 TABLET, EXTENDED RELEASE ORAL DAILY
Qty: 90 TABLET | Refills: 1 | Status: SHIPPED | OUTPATIENT
Start: 2023-06-08

## 2023-06-08 RX ORDER — FUROSEMIDE 20 MG/1
20 TABLET ORAL DAILY
Qty: 90 TABLET | Refills: 1 | Status: SHIPPED | OUTPATIENT
Start: 2023-06-08

## 2023-06-08 NOTE — PROGRESS NOTES
Subjective   Follow up, chest pain, shortness of breath, palpitations  Lian Kang is a 63 y.o. female who presents to day for Med Management (Verbal no med change. ), Shortness of Breath, Chest Pain, Dizziness, Back Pain, Edema, and Palpitations.    CHIEF COMPLIANT  Chief Complaint   Patient presents with    Med Management     Verbal no med change.     Shortness of Breath    Chest Pain    Dizziness    Back Pain    Edema    Palpitations       Active Problems:  Problem List Items Addressed This Visit          Advance Directives and General Issues    Polypharmacy       Cardiac and Vasculature    SVT (supraventricular tachycardia)    Overview       Status post EP study and catheter ablation, 09/18/2014 for typical atrial flutter and typical AV gisella reentrant tachycardia.    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  Initiation of flecainide and metoprolol therapy.  Normal LV function by recent cardiac stress test.            Relevant Medications    isosorbide mononitrate (IMDUR) 30 MG 24 hr tablet    metoprolol succinate XL (TOPROL-XL) 100 MG 24 hr tablet    nitroglycerin (NITROSTAT) 0.4 MG SL tablet    Other Relevant Orders    Cardiac Event Monitor    Presence of cardiac pacemaker    Overview     [place] dual-chamber permanent pacemaker, 3/7/2016         Essential hypertension    Relevant Medications    furosemide (LASIX) 20 MG tablet    isosorbide mononitrate (IMDUR) 30 MG 24 hr tablet    lisinopril (PRINIVIL,ZESTRIL) 5 MG tablet    metoprolol succinate XL (TOPROL-XL) 100 MG 24 hr tablet    Mixed hyperlipidemia    Relevant Medications    rosuvastatin (CRESTOR) 40 MG tablet    Paroxysmal atrial fibrillation    Relevant Medications    isosorbide mononitrate (IMDUR) 30 MG 24 hr tablet    metoprolol succinate XL (TOPROL-XL) 100 MG 24 hr tablet    nitroglycerin (NITROSTAT) 0.4 MG SL tablet    potassium chloride  (K-DUR,KLOR-CON) 20 MEQ CR tablet    rivaroxaban (Xarelto) 20 MG tablet    Other Relevant Orders    Cardiac Event Monitor    Comprehensive Metabolic Panel    Precordial chest pain - Primary    Relevant Medications    nitroglycerin (NITROSTAT) 0.4 MG SL tablet    Other Relevant Orders    Stress Test With Myocardial Perfusion One Day    Cardiac Event Monitor       Pulmonary and Pneumonias    Shortness of breath       Sleep    AMANDEEP (obstructive sleep apnea)       HPI  HPI  The last week the patient is concerned because she is getting intermittent shortness of breath at rest she said she had significant swelling of the lower extremities which is better today with intermittent also sharp chest pain also at rest on and off with palpitations on and off she feels also weak and tired  PRIOR MEDS  Current Outpatient Medications on File Prior to Visit   Medication Sig Dispense Refill    albuterol sulfate  (90 Base) MCG/ACT inhaler Inhale 2 puffs 4 (Four) Times a Day As Needed for Wheezing.      bromocriptine (PARLODEL) 2.5 MG tablet Take 1 tablet by mouth Daily.      busPIRone (BUSPAR) 10 MG tablet Take 1 tablet by mouth 3 (Three) Times a Day As Needed (anxiety).      clonazePAM (KlonoPIN) 0.5 MG tablet Take 0.25 mg by mouth 3 (Three) Times a Day As Needed for Anxiety.      famotidine (PEPCID) 20 MG tablet Daily.      fluticasone (FLONASE) 50 MCG/ACT nasal spray 2 sprays into the nostril(s) as directed by provider Daily.      levalbuterol (XOPENEX HFA) 45 MCG/ACT inhaler Inhale 1-2 puffs Every 4 (Four) Hours As Needed for Wheezing.      magnesium oxide (MAGOX) 400 (241.3 Mg) MG tablet tablet Take 1 tablet by mouth 2 (Two) Times a Day.      metFORMIN (GLUCOPHAGE) 500 MG tablet Take 1 tablet by mouth Daily With Breakfast.      methocarbamol (ROBAXIN) 500 MG tablet Take 1 tablet by mouth 3 (Three) Times a Day As Needed for Muscle Spasms.      polyethylene glycol (MIRALAX) packet Take 17 g by mouth Daily.      tiZANidine  (ZANAFLEX) 4 MG tablet Take 1 tablet by mouth At Night As Needed for Muscle Spasms.      topiramate (TOPAMAX) 100 MG tablet Take 1 tablet by mouth Daily.      traZODone (DESYREL) 50 MG tablet Take 1 tablet by mouth 2 (Two) Times a Day.      Umeclidinium Bromide (INCRUSE ELLIPTA) 62.5 MCG/INH aerosol powder  Inhale 1 puff Daily.      [DISCONTINUED] cetirizine (zyrTEC) 10 MG tablet Take 1 tablet by mouth Daily.      [DISCONTINUED] docusate sodium (COLACE) 100 MG capsule Take 1 capsule by mouth 2 (Two) Times a Day As Needed for Constipation.      [DISCONTINUED] escitalopram (LEXAPRO) 10 MG tablet Take 1 tablet by mouth Daily.      [DISCONTINUED] furosemide (LASIX) 20 MG tablet Take 1 tablet by mouth Daily. 90 tablet 1    [DISCONTINUED] gabapentin (NEURONTIN) 300 MG capsule Take 1 capsule by mouth 3 (Three) Times a Day.      [DISCONTINUED] HYDROcodone-acetaminophen (NORCO) 5-325 MG per tablet Take 1 tablet by mouth Every 6 (Six) Hours As Needed for Moderate Pain.      [DISCONTINUED] isosorbide mononitrate (IMDUR) 30 MG 24 hr tablet Take 1 tablet by mouth Daily. 90 tablet 1    [DISCONTINUED] lisinopril (PRINIVIL,ZESTRIL) 5 MG tablet Take 1 tablet by mouth Daily. 90 tablet 1    [DISCONTINUED] metoprolol succinate XL (TOPROL-XL) 50 MG 24 hr tablet Take 1 tablet by mouth Daily. 90 tablet 1    [DISCONTINUED] nitroglycerin (NITROSTAT) 0.4 MG SL tablet Place 1 tablet under the tongue Every 5 (Five) Minutes As Needed for Chest Pain. Take no more than 3 doses in 15 minutes. 90 tablet 1    [DISCONTINUED] pantoprazole (PROTONIX) 40 MG EC tablet Take 1 tablet by mouth Daily.      [DISCONTINUED] potassium chloride (K-DUR,KLOR-CON) 10 MEQ CR tablet Take 1 tablet by mouth Daily. 90 tablet 1    [DISCONTINUED] potassium chloride (K-DUR,KLOR-CON) 20 MEQ CR tablet Take 1 tablet by mouth 2 (Two) Times a Day. 4 tablet 1    [DISCONTINUED] promethazine (PHENERGAN) 25 MG tablet Take 1 tablet by mouth 3 (Three) Times a Day As Needed for Nausea  or Vomiting.      [DISCONTINUED] raNITIdine (ZANTAC) 150 MG tablet Take 1 tablet by mouth Every Evening.      [DISCONTINUED] rivaroxaban (Xarelto) 20 MG tablet Take 1 tablet by mouth Daily. 90 tablet 1    [DISCONTINUED] rosuvastatin (CRESTOR) 40 MG tablet Take 1 tablet by mouth Daily. 90 tablet 1    [DISCONTINUED] sertraline (ZOLOFT) 100 MG tablet Take 1 tablet by mouth Daily. (Patient taking differently: Take 1.5 tablets by mouth Daily. Take 1 & 1/2 tab of 100mg per dose) 90 tablet 1     No current facility-administered medications on file prior to visit.       ALLERGIES  Patient has no known allergies.    HISTORY  Past Medical History:   Diagnosis Date    Anxiety and depression     Atypical chest pain     Diabetes mellitus     Dyslipidemia     GERD (gastroesophageal reflux disease)     H/O valvular heart disease     Echocardiogram 2013 revealing EF 60%. Mild left atrial enlargement, trace AI, trace MR, mild TR.    Hypertension     Insomnia     Migraine headache     Chronic migraine headaches/Topamax therapy.     Moderate obesity     AMANDEEP on CPAP     Pituitary adenoma     History of pituitary adenoma/bromocriptine therapy.     Sinus bradycardia     Severe symptomatic bradycardia secondary to sinus node dysfunction and sinus bradycardia with pacemaker at elective replacement indicator.    SVT (supraventricular tachycardia)     With prior ablation.       Social History     Socioeconomic History    Marital status:    Tobacco Use    Smoking status: Former     Packs/day: 0.25     Types: Cigarettes    Smokeless tobacco: Never   Substance and Sexual Activity    Alcohol use: No    Drug use: No    Sexual activity: Defer       Family History   Problem Relation Age of Onset    Heart disease Mother     Heart failure Mother     Heart disease Father     Heart failure Father        Review of Systems   Respiratory:  Positive for chest tightness and shortness of breath. Negative for apnea, cough, choking, wheezing and  "stridor.    Cardiovascular:  Positive for chest pain, palpitations and leg swelling.   Neurological:  Positive for dizziness.     Objective     VITALS: /97 (BP Location: Right arm, Patient Position: Sitting, Cuff Size: Adult)   Pulse 68   Ht 170.2 cm (67\")   Wt 81.2 kg (179 lb)   LMP  (LMP Unknown)   SpO2 96%   BMI 28.04 kg/m²     LABS:   Lab Results (most recent)       None            IMAGING:   XR Chest 1 View    Result Date: 3/18/2023  Left basilar atelectasis or infiltrate. Signer Name: Ortega Jasso MD  Signed: 3/18/2023 6:32 PM  Workstation Name: [id number]  [facility]      EXAM:  Physical Exam  Vitals reviewed.   Constitutional:       Appearance: She is well-developed.   HENT:      Head: Normocephalic and atraumatic.   Eyes:      Pupils: Pupils are equal, round, and reactive to light.   Neck:      Thyroid: No thyromegaly.      Vascular: No JVD.   Cardiovascular:      Rate and Rhythm: Normal rate and regular rhythm.      Heart sounds: Normal heart sounds. No murmur heard.    No friction rub. No gallop.      Comments: Pacer pocket nontender  Pulmonary:      Effort: Pulmonary effort is normal. No respiratory distress.      Breath sounds: Normal breath sounds. No stridor. No wheezing or rales.   Chest:      Chest wall: No tenderness.   Musculoskeletal:         General: No tenderness or deformity.      Cervical back: Neck supple.   Skin:     General: Skin is warm and dry.   Neurological:      Mental Status: She is alert and oriented to person, place, and time.      Cranial Nerves: No cranial nerve deficit.      Coordination: Coordination normal.       Procedure   Procedures       Assessment & Plan     Diagnoses and all orders for this visit:    1. Precordial chest pain (Primary)  -     nitroglycerin (NITROSTAT) 0.4 MG SL tablet; Place 1 tablet under the tongue Every 5 (Five) Minutes As Needed for Chest Pain. Take no more than 3 doses in 15 minutes.  Dispense: 90 tablet; Refill: " 1  -     Stress Test With Myocardial Perfusion One Day; Future  -     Cardiac Event Monitor; Future    2. Shortness of breath    3. SVT (supraventricular tachycardia)  -     Cardiac Event Monitor; Future    4. Paroxysmal atrial fibrillation  -     potassium chloride (K-DUR,KLOR-CON) 20 MEQ CR tablet; Take 1 tablet by mouth 2 (Two) Times a Day.  Dispense: 4 tablet; Refill: 1  -     rivaroxaban (Xarelto) 20 MG tablet; Take 1 tablet by mouth Daily.  Dispense: 90 tablet; Refill: 1  -     Cardiac Event Monitor; Future  -     Comprehensive Metabolic Panel; Future    5. Mixed hyperlipidemia  -     rosuvastatin (CRESTOR) 40 MG tablet; Take 1 tablet by mouth Daily.  Dispense: 90 tablet; Refill: 1    6. Essential hypertension  -     Discontinue: metoprolol succinate XL (TOPROL-XL) 100 MG 24 hr tablet; Take 1 tablet by mouth Daily.  Dispense: 90 tablet; Refill: 1  -     furosemide (LASIX) 20 MG tablet; Take 1 tablet by mouth Daily.  Dispense: 90 tablet; Refill: 1  -     isosorbide mononitrate (IMDUR) 30 MG 24 hr tablet; Take 1 tablet by mouth Daily.  Dispense: 90 tablet; Refill: 1  -     lisinopril (PRINIVIL,ZESTRIL) 5 MG tablet; Take 1 tablet by mouth Daily.  Dispense: 90 tablet; Refill: 1  -     metoprolol succinate XL (TOPROL-XL) 100 MG 24 hr tablet; Take 1 tablet by mouth Daily.  Dispense: 90 tablet; Refill: 1    7. Presence of cardiac pacemaker    8. AMANDEEP (obstructive sleep apnea)    9. Polypharmacy    1.  She is having chest pain with typical and atypical features I will proceed with pharmacological stress testing for assessment of ischemia, I discussed the echocardiogram which was done in March showed normal systolic function but she has diastolic dysfunction  2.  I reviewed the interrogation of the pacemaker with an episode of tachycardia most likely SVT versus atrial fibrillation, get an event monitor for assessment as she is having palpitations  3.  Blood pressure is elevated i she still is also elevated at home so I  am going to increase the dose of the of the metoprolol to 100 mg/day  4.  As mentioned above pacemaker interrogated she has a little bit over 2 years of battery life  5.  Reviewed labs from March with slightly evaded fasting glucose normal creatinine and low potassium of only 3 I do not have any more recent labs lipids also back on March showed triglycerides 163 HDL 50 and LDL of 60 2 repeat potassium level  6.  She is not using CPAP she is not willing to do that  1.  Regarding polypharmacy her primary care physician has cut down her medications but she still having significant number of medications I will leave this to her primary care physician for assessment    Return After stress test.                   MEDS ORDERED DURING VISIT:  New Medications Ordered This Visit   Medications    furosemide (LASIX) 20 MG tablet     Sig: Take 1 tablet by mouth Daily.     Dispense:  90 tablet     Refill:  1    isosorbide mononitrate (IMDUR) 30 MG 24 hr tablet     Sig: Take 1 tablet by mouth Daily.     Dispense:  90 tablet     Refill:  1    lisinopril (PRINIVIL,ZESTRIL) 5 MG tablet     Sig: Take 1 tablet by mouth Daily.     Dispense:  90 tablet     Refill:  1    metoprolol succinate XL (TOPROL-XL) 100 MG 24 hr tablet     Sig: Take 1 tablet by mouth Daily.     Dispense:  90 tablet     Refill:  1    nitroglycerin (NITROSTAT) 0.4 MG SL tablet     Sig: Place 1 tablet under the tongue Every 5 (Five) Minutes As Needed for Chest Pain. Take no more than 3 doses in 15 minutes.     Dispense:  90 tablet     Refill:  1    potassium chloride (K-DUR,KLOR-CON) 20 MEQ CR tablet     Sig: Take 1 tablet by mouth 2 (Two) Times a Day.     Dispense:  4 tablet     Refill:  1    rivaroxaban (Xarelto) 20 MG tablet     Sig: Take 1 tablet by mouth Daily.     Dispense:  90 tablet     Refill:  1    rosuvastatin (CRESTOR) 40 MG tablet     Sig: Take 1 tablet by mouth Daily.     Dispense:  90 tablet     Refill:  1       As always, Ernesto Hurst APRN  I  appreciate very much the opportunity to participate in the cardiovascular care of your patients. Please do not hesitate to call me with any questions with regards to Lian Pearl evaluation and management.         This document has been electronically signed by Anthony Faustin MD  June 8, 2023 12:25 EDT    This note is dictated utilizing voice recognition software.

## 2023-06-16 ENCOUNTER — OFFICE VISIT (OUTPATIENT)
Dept: PULMONOLOGY | Facility: CLINIC | Age: 63
End: 2023-06-16
Payer: COMMERCIAL

## 2023-06-16 VITALS
HEIGHT: 67 IN | SYSTOLIC BLOOD PRESSURE: 144 MMHG | RESPIRATION RATE: 18 BRPM | HEART RATE: 124 BPM | DIASTOLIC BLOOD PRESSURE: 98 MMHG | OXYGEN SATURATION: 94 % | TEMPERATURE: 96.9 F | WEIGHT: 178.2 LBS | BODY MASS INDEX: 27.97 KG/M2

## 2023-06-16 DIAGNOSIS — J44.9 CHRONIC OBSTRUCTIVE PULMONARY DISEASE, UNSPECIFIED COPD TYPE: ICD-10-CM

## 2023-06-16 DIAGNOSIS — E66.3 OVERWEIGHT: ICD-10-CM

## 2023-06-16 DIAGNOSIS — G47.33 OSA (OBSTRUCTIVE SLEEP APNEA): Primary | ICD-10-CM

## 2023-06-16 RX ORDER — TIOTROPIUM BROMIDE INHALATION SPRAY 3.12 UG/1
2 SPRAY, METERED RESPIRATORY (INHALATION)
Qty: 4 G | Refills: 0 | COMMUNITY
Start: 2023-06-16

## 2023-06-16 RX ORDER — TIOTROPIUM BROMIDE INHALATION SPRAY 3.12 UG/1
2 SPRAY, METERED RESPIRATORY (INHALATION)
Qty: 4 G | Refills: 5 | Status: SHIPPED | OUTPATIENT
Start: 2023-06-16

## 2023-06-16 NOTE — PROGRESS NOTES
"Chief Complaint  polycythemia (Referral//SOB during exertion as well as when resting)    Subjective        Lian Kang presents to [facility]  History of Present Illness    Ms. Kang is a 63 year old female with a medical history significant for anxiety, depression, diabetes, hyeprlipidemia, GERD, hypertension, and AMANDEEP.    She presents today for evaluation of shortness of breath and sleep apnea.  She states that she had a sleep study several years  ago and used a cpap for awhile.  She was unable to tolerate the mask.  She does complain of snoring, non restorative sleep and daytime fatigue.  She states that she also has shortness of breath with exertion.  She is currently taking albuterol as needed and reports that this does help some. She is an occasional smoker but has been exposed to second hand smoke.        Objective   Vital Signs:  /98 (BP Location: Left arm, Patient Position: Sitting, Cuff Size: Adult)   Pulse (!) 124   Temp 96.9 °F (36.1 °C) (Temporal)   Resp 18   Ht 170.2 cm (67\")   Wt 80.8 kg (178 lb 3.2 oz)   SpO2 94%   BMI 27.91 kg/m²   Estimated body mass index is 27.91 kg/m² as calculated from the following:    Height as of this encounter: 170.2 cm (67\").    Weight as of this encounter: 80.8 kg (178 lb 3.2 oz).       BMI is >= 25 and <30. (Overweight) The following options were offered after discussion;: exercise counseling/recommendations and nutrition counseling/recommendations      Physical Exam     GENERAL APPEARANCE: Well developed, well nourished, alert and cooperative, and appears to be in no acute distress.    HEAD: normocephalic. Atraumatic.    EYES: PERRL, EOMI. Vision is grossly intact.    THROAT: Oral cavity and pharynx normal. No inflammation, swelling, exudate, or lesions.     NECK: Neck supple.  No thyromegaly.    CARDIAC: Normal S1 and S2. No S3, S4 or murmurs. Rhythm is regular.     RESPIRATORY:Bilateral air entry " positive. Bilateral diminished breath sounds. No wheezing, crackles or rhonchi noted.    GI: Positive bowel sounds. Soft, nondistended, nontender.     MUSCULOSKELETAL: No significant deformity or joint abnormality. No edema. Peripheral pulses intact. No varicosities.    NEUROLOGICAL: Strength and sensation symmetric and intact throughout.     PSYCHIATRIC: The mental examination revealed the patient was oriented to person, place, and time.     Result Review :  The following data was reviewed by: ADRIAN Bright on 06/16/2023:  Common labs          8/16/2022    18:52 8/16/2022    19:04 3/18/2023    17:44 3/24/2023    14:05   Common Labs   Glucose  102  112  108    BUN  9  12  27    Creatinine 1.00  0.92  0.89  0.93    Sodium  147  139  140    Potassium  3.3  3.4  3.0    Chloride  110  106  103    Calcium  9.2  9.3  9.0    Albumin  4.33  4.2  4.0    Total Bilirubin  <0.2  0.3  0.2    Alkaline Phosphatase  72  60  69    AST (SGOT)  20  15  15    ALT (SGPT)  15  10  13    WBC  7.48  6.73  10.36    Hemoglobin  14.0  13.0  13.6    Hematocrit  43.5  40.3  40.9    Platelets  216  195  259    Total Cholesterol    140    Triglycerides    163    HDL Cholesterol    50    LDL Cholesterol     62                   Assessment and Plan   Diagnoses and all orders for this visit:    1. AMANDEEP (obstructive sleep apnea) (Primary)  -     Home Sleep Study; Future    2. Chronic obstructive pulmonary disease, unspecified COPD type  -     Complete PFT - Pre & Post Bronchodilator; Future    3. Overweight    Other orders  -     tiotropium bromide monohydrate (Spiriva Respimat) 2.5 MCG/ACT aerosol solution inhaler; Inhale 2 puffs Daily.  Dispense: 4 g; Refill: 0  -     tiotropium bromide monohydrate (Spiriva Respimat) 2.5 MCG/ACT aerosol solution inhaler; Inhale 2 puffs Daily.  Dispense: 4 g; Refill: 5        Has this patient had a previous sleep study? yes   Has the patient had observed apnea during sleep?yes   Do experience excessive  daytime sleepiness: yes  Do experience habitual snoring, gasping/choking episodes associated with awakenings?yes   Do you have unexplained hypertension?yes   Do you have Nonrestorative sleep? yes             [place] sleepiness scale: 14  STOPBANG score: 4  Ordered home sleep study   The patient was extensively educated on the consequences of untreated obstructive sleep apnea namely cardiovascular/metabolic disorder, neurocognitive deficit, daytime sleepiness, motor vehicle accidents, depression, mood disorders and reduced quality of life.  At the end of conversation, the patient voices understanding of the disease process and treatment modality.  Patient also understands the risk of untreated obstructive sleep apnea and benefit benefits of the treatment.    Counseling time was greater than 10 minutes.      Ordered PFT to assess lung function.  Will start her on Spiriva once daily.  Continue albuterol as needed.    Obtained Alpha-1 antitrypsin swab for genotype.    6 MWT was completed in office. No oxygen desaturations noted during ambulation.      Encouraged complete smoking cessation.         Follow Up   Return in about 3 months (around 9/16/2023).  Patient was given instructions and counseling regarding her condition or for health maintenance advice. Please see specific information pulled into the AVS if appropriate.

## 2023-07-27 ENCOUNTER — TELEPHONE (OUTPATIENT)
Dept: PULMONOLOGY | Facility: CLINIC | Age: 63
End: 2023-07-27
Payer: COMMERCIAL

## 2023-07-27 ENCOUNTER — DOCUMENTATION (OUTPATIENT)
Dept: PULMONOLOGY | Facility: CLINIC | Age: 63
End: 2023-07-27
Payer: COMMERCIAL

## 2023-07-27 NOTE — TELEPHONE ENCOUNTER
Called and spoke with patient to report sleep study results. Although the result was borderline she doesn't want to do a repeat study at this time.

## 2023-07-27 NOTE — PROGRESS NOTES
Sleep study was reviewed.  AHI was 4.8, which is consistent with a normal study.  Will discuss if she wants to repeat the study as 10% of sleep apnea does not show up on a single night test.

## 2023-08-10 ENCOUNTER — TELEPHONE (OUTPATIENT)
Dept: CARDIOLOGY | Facility: CLINIC | Age: 63
End: 2023-08-10
Payer: COMMERCIAL

## 2023-08-10 NOTE — TELEPHONE ENCOUNTER
I called Lian Morfinbbs this morning to see if she had received her cell adapter that was ordered in July.  She did receive it and has it plugged into her remote monitor.  We were unsuccessful in getting a transmission to come through.  Patient states she does not get good cell signal at home.  I encouraged her to move the monitor closer to a window and try again,  She will try to move the monitor and try again closer to a window.

## 2023-09-01 ENCOUNTER — TELEPHONE (OUTPATIENT)
Dept: CARDIOLOGY | Facility: CLINIC | Age: 63
End: 2023-09-01
Payer: COMMERCIAL

## 2023-09-01 ENCOUNTER — HOSPITAL ENCOUNTER (OUTPATIENT)
Dept: NUCLEAR MEDICINE | Facility: HOSPITAL | Age: 63
Discharge: HOME | End: 2023-09-01
Payer: COMMERCIAL

## 2023-09-01 ENCOUNTER — HOSPITAL ENCOUNTER (OUTPATIENT)
Dept: CARDIOLOGY | Facility: HOSPITAL | Age: 63
Discharge: HOME | End: 2023-09-01
Payer: COMMERCIAL

## 2023-09-01 ENCOUNTER — HOSPITAL ENCOUNTER (OUTPATIENT)
Dept: RESPIRATORY THERAPY | Facility: HOSPITAL | Age: 63
Discharge: HOME | End: 2023-09-01
Payer: COMMERCIAL

## 2023-09-01 DIAGNOSIS — J44.9 CHRONIC OBSTRUCTIVE PULMONARY DISEASE, UNSPECIFIED COPD TYPE: ICD-10-CM

## 2023-09-01 DIAGNOSIS — R07.2 PRECORDIAL CHEST PAIN: ICD-10-CM

## 2023-09-01 LAB
BH CV NUCLEAR PRIOR STUDY: 3
BH CV REST NUCLEAR ISOTOPE DOSE: 10.2 MCI
BH CV STRESS BP STAGE 1: NORMAL
BH CV STRESS COMMENTS STAGE 1: NORMAL
BH CV STRESS DOSE REGADENOSON STAGE 1: 0.4
BH CV STRESS DURATION MIN STAGE 1: 0
BH CV STRESS DURATION SEC STAGE 1: 10
BH CV STRESS HR STAGE 1: 71
BH CV STRESS NUCLEAR ISOTOPE DOSE: 30.2 MCI
BH CV STRESS PROTOCOL 1: NORMAL
BH CV STRESS RECOVERY BP: NORMAL MMHG
BH CV STRESS RECOVERY HR: 70 BPM
BH CV STRESS STAGE 1: 1
LV EF NUC BP: 57 %
MAXIMAL PREDICTED HEART RATE: 156 BPM
PERCENT MAX PREDICTED HR: 45.51 %
STRESS BASELINE BP: NORMAL MMHG
STRESS BASELINE HR: 73 BPM
STRESS PERCENT HR: 54 %
STRESS POST PEAK BP: NORMAL MMHG
STRESS POST PEAK HR: 71 BPM
STRESS TARGET HR: 133 BPM

## 2023-09-01 PROCEDURE — 0 TECHNETIUM SESTAMIBI: Performed by: SPECIALIST

## 2023-09-01 PROCEDURE — 94060 EVALUATION OF WHEEZING: CPT

## 2023-09-01 PROCEDURE — A9500 TC99M SESTAMIBI: HCPCS | Performed by: SPECIALIST

## 2023-09-01 PROCEDURE — 78452 HT MUSCLE IMAGE SPECT MULT: CPT

## 2023-09-01 PROCEDURE — 94726 PLETHYSMOGRAPHY LUNG VOLUMES: CPT

## 2023-09-01 PROCEDURE — 94729 DIFFUSING CAPACITY: CPT

## 2023-09-01 PROCEDURE — 25010000002 REGADENOSON 0.4 MG/5ML SOLUTION: Performed by: SPECIALIST

## 2023-09-01 PROCEDURE — 94640 AIRWAY INHALATION TREATMENT: CPT

## 2023-09-01 PROCEDURE — 93017 CV STRESS TEST TRACING ONLY: CPT

## 2023-09-01 RX ORDER — REGADENOSON 0.08 MG/ML
0.4 INJECTION, SOLUTION INTRAVENOUS
Status: COMPLETED | OUTPATIENT
Start: 2023-09-01 | End: 2023-09-01

## 2023-09-01 RX ORDER — ALBUTEROL SULFATE 2.5 MG/3ML
2.5 SOLUTION RESPIRATORY (INHALATION) ONCE
Status: COMPLETED | OUTPATIENT
Start: 2023-09-01 | End: 2023-09-01

## 2023-09-01 RX ADMIN — ALBUTEROL SULFATE 2.5 MG: 2.5 SOLUTION RESPIRATORY (INHALATION) at 09:32

## 2023-09-01 RX ADMIN — REGADENOSON 0.4 MG: 0.08 INJECTION, SOLUTION INTRAVENOUS at 11:11

## 2023-09-01 RX ADMIN — TECHNETIUM TC 99M SESTAMIBI 1 DOSE: 1 INJECTION INTRAVENOUS at 10:06

## 2023-09-01 RX ADMIN — TECHNETIUM TC 99M SESTAMIBI 1 DOSE: 1 INJECTION INTRAVENOUS at 11:11

## 2023-09-01 NOTE — TELEPHONE ENCOUNTER
Surgery clearance faxed over for a upper endoscopy and colonoscopy. Clearance filled out and placed on Rama's desk.

## 2023-09-06 ENCOUNTER — TELEPHONE (OUTPATIENT)
Dept: PULMONOLOGY | Facility: CLINIC | Age: 63
End: 2023-09-06
Payer: COMMERCIAL

## 2023-09-06 NOTE — TELEPHONE ENCOUNTER
----- Message from ADRIAN Bright sent at 9/6/2023 10:57 AM EDT -----  Will you let her know that her PFT is normal.    ----- Message -----  From: Rolo Vazquez MD  Sent: 9/2/2023   6:20 AM EDT  To: ADRIAN Bright

## 2023-09-25 ENCOUNTER — OFFICE VISIT (OUTPATIENT)
Dept: CARDIOLOGY | Facility: CLINIC | Age: 63
End: 2023-09-25

## 2023-09-25 VITALS
HEIGHT: 66 IN | OXYGEN SATURATION: 93 % | RESPIRATION RATE: 16 BRPM | WEIGHT: 180.2 LBS | DIASTOLIC BLOOD PRESSURE: 63 MMHG | SYSTOLIC BLOOD PRESSURE: 99 MMHG | HEART RATE: 69 BPM | BODY MASS INDEX: 28.96 KG/M2

## 2023-09-25 DIAGNOSIS — R07.89 ATYPICAL CHEST PAIN: ICD-10-CM

## 2023-09-25 DIAGNOSIS — Z95.0 PRESENCE OF CARDIAC PACEMAKER: ICD-10-CM

## 2023-09-25 DIAGNOSIS — E78.2 MIXED HYPERLIPIDEMIA: ICD-10-CM

## 2023-09-25 DIAGNOSIS — R07.2 PRECORDIAL CHEST PAIN: ICD-10-CM

## 2023-09-25 DIAGNOSIS — I10 ESSENTIAL HYPERTENSION: ICD-10-CM

## 2023-09-25 DIAGNOSIS — R06.02 SHORTNESS OF BREATH: ICD-10-CM

## 2023-09-25 DIAGNOSIS — G47.33 OSA (OBSTRUCTIVE SLEEP APNEA): ICD-10-CM

## 2023-09-25 DIAGNOSIS — I48.0 PAROXYSMAL ATRIAL FIBRILLATION: Primary | ICD-10-CM

## 2023-09-25 DIAGNOSIS — F17.210 CIGARETTE SMOKER: ICD-10-CM

## 2023-09-25 DIAGNOSIS — I47.10 SVT (SUPRAVENTRICULAR TACHYCARDIA): ICD-10-CM

## 2023-09-25 PROCEDURE — 3074F SYST BP LT 130 MM HG: CPT | Performed by: SPECIALIST

## 2023-09-25 PROCEDURE — 3078F DIAST BP <80 MM HG: CPT | Performed by: SPECIALIST

## 2023-09-25 PROCEDURE — 99214 OFFICE O/P EST MOD 30 MIN: CPT | Performed by: SPECIALIST

## 2023-09-25 RX ORDER — NITROGLYCERIN 0.4 MG/1
0.4 TABLET SUBLINGUAL
Qty: 90 TABLET | Refills: 1 | Status: SHIPPED | OUTPATIENT
Start: 2023-09-25

## 2023-09-25 RX ORDER — ROSUVASTATIN CALCIUM 40 MG/1
40 TABLET, COATED ORAL DAILY
Qty: 90 TABLET | Refills: 1 | Status: SHIPPED | OUTPATIENT
Start: 2023-09-25

## 2023-09-25 RX ORDER — FUROSEMIDE 20 MG/1
20 TABLET ORAL DAILY
Qty: 90 TABLET | Refills: 1 | Status: SHIPPED | OUTPATIENT
Start: 2023-09-25

## 2023-09-25 RX ORDER — ISOSORBIDE MONONITRATE 30 MG/1
30 TABLET, EXTENDED RELEASE ORAL DAILY
Qty: 90 TABLET | Refills: 1 | Status: SHIPPED | OUTPATIENT
Start: 2023-09-25

## 2023-09-25 RX ORDER — METOPROLOL SUCCINATE 100 MG/1
100 TABLET, EXTENDED RELEASE ORAL DAILY
Qty: 90 TABLET | Refills: 1 | Status: SHIPPED | OUTPATIENT
Start: 2023-09-25

## 2023-09-25 RX ORDER — LISINOPRIL 5 MG/1
5 TABLET ORAL DAILY
Qty: 90 TABLET | Refills: 1 | Status: SHIPPED | OUTPATIENT
Start: 2023-09-25

## 2023-09-25 RX ORDER — POTASSIUM CHLORIDE 20 MEQ/1
20 TABLET, EXTENDED RELEASE ORAL 2 TIMES DAILY
Qty: 4 TABLET | Refills: 1 | Status: SHIPPED | OUTPATIENT
Start: 2023-09-25
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
## 2023-09-26 ENCOUNTER — OFFICE VISIT (OUTPATIENT)
Dept: PSYCHIATRY | Facility: CLINIC | Age: 63
End: 2023-09-26
Payer: COMMERCIAL

## 2023-09-26 VITALS
HEART RATE: 120 BPM | BODY MASS INDEX: 29.02 KG/M2 | SYSTOLIC BLOOD PRESSURE: 122 MMHG | WEIGHT: 180.6 LBS | HEIGHT: 66 IN | DIASTOLIC BLOOD PRESSURE: 74 MMHG

## 2023-09-26 DIAGNOSIS — F33.1 MODERATE EPISODE OF RECURRENT MAJOR DEPRESSIVE DISORDER: Primary | ICD-10-CM

## 2023-09-26 DIAGNOSIS — Z79.899 MEDICATION MANAGEMENT: ICD-10-CM

## 2023-09-26 DIAGNOSIS — F41.1 GAD (GENERALIZED ANXIETY DISORDER): ICD-10-CM

## 2023-09-26 LAB
EXTERNAL AMPHETAMINE SCREEN URINE: NEGATIVE
EXTERNAL BENZODIAZEPINE SCREEN URINE: NEGATIVE
EXTERNAL BUPRENORPHINE SCREEN URINE: NEGATIVE
EXTERNAL COCAINE SCREEN URINE: NEGATIVE
EXTERNAL MDMA: NEGATIVE
EXTERNAL METHADONE SCREEN URINE: NEGATIVE
EXTERNAL METHAMPHETAMINE SCREEN URINE: NEGATIVE
EXTERNAL OPIATES SCREEN URINE: NEGATIVE
EXTERNAL OXYCODONE SCREEN URINE: NEGATIVE
EXTERNAL THC SCREEN URINE: NEGATIVE

## 2023-09-26 RX ORDER — PREGABALIN 100 MG/1
1 CAPSULE ORAL EVERY 12 HOURS SCHEDULED
COMMUNITY
Start: 2023-09-08

## 2023-09-26 RX ORDER — DOCUSATE SODIUM 100 MG/1
100 CAPSULE, LIQUID FILLED ORAL DAILY PRN
COMMUNITY
Start: 2023-08-10

## 2023-09-26 RX ORDER — PAROXETINE 30 MG/1
30 TABLET, FILM COATED ORAL EVERY MORNING
COMMUNITY
Start: 2023-09-15 | End: 2023-09-26

## 2023-09-26 RX ORDER — SERTRALINE HYDROCHLORIDE 25 MG/1
TABLET, FILM COATED ORAL
Qty: 45 TABLET | Refills: 0 | Status: SHIPPED | OUTPATIENT
Start: 2023-09-27 | End: 2023-10-27

## 2023-09-26 RX ORDER — PANTOPRAZOLE SODIUM 40 MG/1
1 TABLET, DELAYED RELEASE ORAL DAILY
COMMUNITY
Start: 2023-09-08
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
## 2023-10-03 ENCOUNTER — TELEPHONE (OUTPATIENT)
Dept: CARDIOLOGY | Facility: CLINIC | Age: 63
End: 2023-10-03
Payer: COMMERCIAL

## 2023-10-03 NOTE — TELEPHONE ENCOUNTER
Patient is needing clearance for colonoscopy and EGD. Clearance is on provider desk to be filled out. She was scheduled for Friday to have done but is being rescheduled.

## 2023-12-12 ENCOUNTER — TELEPHONE (OUTPATIENT)
Dept: CARDIOLOGY | Facility: CLINIC | Age: 63
End: 2023-12-12
Payer: COMMERCIAL

## 2023-12-12 NOTE — TELEPHONE ENCOUNTER
Attempted to call pt but number is disconnected, no other numbers listed. Called to let pt know that surgery clearance was signed and she will need to hold Xarelto for no more than 2 day prior to procedure and then restart whenever safe after. Will fax to [facility] [phone].

## 2024-03-11 ENCOUNTER — APPOINTMENT (OUTPATIENT)
Dept: GENERAL RADIOLOGY | Facility: HOSPITAL | Age: 64
End: 2024-03-11
Payer: COMMERCIAL

## 2024-03-11 ENCOUNTER — HOSPITAL ENCOUNTER (EMERGENCY)
Facility: HOSPITAL | Age: 64
Discharge: HOME | End: 2024-03-11
Attending: STUDENT IN AN ORGANIZED HEALTH CARE EDUCATION/TRAINING PROGRAM | Admitting: STUDENT IN AN ORGANIZED HEALTH CARE EDUCATION/TRAINING PROGRAM
Payer: COMMERCIAL

## 2024-03-11 VITALS
RESPIRATION RATE: 18 BRPM | BODY MASS INDEX: 28.79 KG/M2 | TEMPERATURE: 98.1 F | WEIGHT: 190 LBS | DIASTOLIC BLOOD PRESSURE: 97 MMHG | HEART RATE: 70 BPM | OXYGEN SATURATION: 99 % | HEIGHT: 68 IN | SYSTOLIC BLOOD PRESSURE: 157 MMHG

## 2024-03-11 DIAGNOSIS — R07.2 SUBSTERNAL CHEST PAIN: Primary | ICD-10-CM

## 2024-03-11 LAB
ALBUMIN SERPL-MCNC: 4.2 G/DL (ref 3.5–5.2)
ALBUMIN/GLOB SERPL: 2 G/DL
ALP SERPL-CCNC: 65 U/L (ref 39–117)
ALT SERPL W P-5'-P-CCNC: 18 U/L (ref 1–33)
ANION GAP SERPL CALCULATED.3IONS-SCNC: 11.1 MMOL/L (ref 5–15)
AST SERPL-CCNC: 17 U/L (ref 1–32)
BASOPHILS # BLD AUTO: 0.1 10*3/MM3 (ref 0–0.2)
BASOPHILS NFR BLD AUTO: 1.5 % (ref 0–1.5)
BILIRUB SERPL-MCNC: 0.2 MG/DL (ref 0–1.2)
BUN SERPL-MCNC: 21 MG/DL (ref 8–23)
BUN/CREAT SERPL: 20.6 (ref 7–25)
CALCIUM SPEC-SCNC: 9.2 MG/DL (ref 8.6–10.5)
CHLORIDE SERPL-SCNC: 110 MMOL/L (ref 98–107)
CO2 SERPL-SCNC: 23.9 MMOL/L (ref 22–29)
CREAT SERPL-MCNC: 1.02 MG/DL (ref 0.57–1)
DEPRECATED RDW RBC AUTO: 45 FL (ref 37–54)
EGFRCR SERPLBLD CKD-EPI 2021: 61.6 ML/MIN/1.73
EOSINOPHIL # BLD AUTO: 0.21 10*3/MM3 (ref 0–0.4)
EOSINOPHIL NFR BLD AUTO: 3.2 % (ref 0.3–6.2)
ERYTHROCYTE [DISTWIDTH] IN BLOOD BY AUTOMATED COUNT: 13.3 % (ref 12.3–15.4)
GLOBULIN UR ELPH-MCNC: 2.1 GM/DL
GLUCOSE SERPL-MCNC: 105 MG/DL (ref 65–99)
HCT VFR BLD AUTO: 39.6 % (ref 34–46.6)
HGB BLD-MCNC: 12.8 G/DL (ref 12–15.9)
HOLD SPECIMEN: NORMAL
HOLD SPECIMEN: NORMAL
IMM GRANULOCYTES # BLD AUTO: 0.02 10*3/MM3 (ref 0–0.05)
IMM GRANULOCYTES NFR BLD AUTO: 0.3 % (ref 0–0.5)
LYMPHOCYTES # BLD AUTO: 2 10*3/MM3 (ref 0.7–3.1)
LYMPHOCYTES NFR BLD AUTO: 30.3 % (ref 19.6–45.3)
MCH RBC QN AUTO: 29.8 PG (ref 26.6–33)
MCHC RBC AUTO-ENTMCNC: 32.3 G/DL (ref 31.5–35.7)
MCV RBC AUTO: 92.3 FL (ref 79–97)
MONOCYTES # BLD AUTO: 0.37 10*3/MM3 (ref 0.1–0.9)
MONOCYTES NFR BLD AUTO: 5.6 % (ref 5–12)
NEUTROPHILS NFR BLD AUTO: 3.89 10*3/MM3 (ref 1.7–7)
NEUTROPHILS NFR BLD AUTO: 59.1 % (ref 42.7–76)
NRBC BLD AUTO-RTO: 0 /100 WBC (ref 0–0.2)
PLATELET # BLD AUTO: 171 10*3/MM3 (ref 140–450)
PMV BLD AUTO: 9.5 FL (ref 6–12)
POTASSIUM SERPL-SCNC: 3.9 MMOL/L (ref 3.5–5.2)
PROT SERPL-MCNC: 6.3 G/DL (ref 6–8.5)
RBC # BLD AUTO: 4.29 10*6/MM3 (ref 3.77–5.28)
SODIUM SERPL-SCNC: 145 MMOL/L (ref 136–145)
TROPONIN T SERPL HS-MCNC: 6 NG/L
WBC NRBC COR # BLD AUTO: 6.59 10*3/MM3 (ref 3.4–10.8)
WHOLE BLOOD HOLD COAG: NORMAL
WHOLE BLOOD HOLD SPECIMEN: NORMAL

## 2024-03-11 PROCEDURE — 36415 COLL VENOUS BLD VENIPUNCTURE: CPT

## 2024-03-11 PROCEDURE — 71045 X-RAY EXAM CHEST 1 VIEW: CPT

## 2024-03-11 PROCEDURE — 84484 ASSAY OF TROPONIN QUANT: CPT | Performed by: STUDENT IN AN ORGANIZED HEALTH CARE EDUCATION/TRAINING PROGRAM

## 2024-03-11 PROCEDURE — 85025 COMPLETE CBC W/AUTO DIFF WBC: CPT | Performed by: STUDENT IN AN ORGANIZED HEALTH CARE EDUCATION/TRAINING PROGRAM

## 2024-03-11 PROCEDURE — 93005 ELECTROCARDIOGRAM TRACING: CPT | Performed by: STUDENT IN AN ORGANIZED HEALTH CARE EDUCATION/TRAINING PROGRAM

## 2024-03-11 PROCEDURE — 80053 COMPREHEN METABOLIC PANEL: CPT | Performed by: STUDENT IN AN ORGANIZED HEALTH CARE EDUCATION/TRAINING PROGRAM

## 2024-03-11 PROCEDURE — 99284 EMERGENCY DEPT VISIT MOD MDM: CPT

## 2024-03-11 PROCEDURE — 71045 X-RAY EXAM CHEST 1 VIEW: CPT | Performed by: RADIOLOGY

## 2024-03-11 PROCEDURE — 99283 EMERGENCY DEPT VISIT LOW MDM: CPT

## 2024-03-11 RX ORDER — ASPIRIN 325 MG
325 TABLET ORAL ONCE
Status: COMPLETED | OUTPATIENT
Start: 2024-03-11 | End: 2024-03-11

## 2024-03-11 RX ORDER — LIDOCAINE HYDROCHLORIDE 20 MG/ML
15 SOLUTION OROPHARYNGEAL ONCE
Status: COMPLETED | OUTPATIENT
Start: 2024-03-11 | End: 2024-03-11

## 2024-03-11 RX ORDER — ALUMINA, MAGNESIA, AND SIMETHICONE 2400; 2400; 240 MG/30ML; MG/30ML; MG/30ML
15 SUSPENSION ORAL ONCE
Status: COMPLETED | OUTPATIENT
Start: 2024-03-11 | End: 2024-03-11

## 2024-03-11 RX ORDER — SODIUM CHLORIDE 0.9 % (FLUSH) 0.9 %
10 SYRINGE (ML) INJECTION AS NEEDED
Status: DISCONTINUED | OUTPATIENT
Start: 2024-03-11 | End: 2024-03-11

## 2024-03-11 RX ORDER — PHENOBARBITAL, HYOSCYAMINE SULFATE, ATROPINE SULFATE AND SCOPOLAMINE HYDROBROMIDE .0194; .1037; 16.2; .0065 MG/1; MG/1; MG/1; MG/1
2 TABLET ORAL ONCE
Status: COMPLETED | OUTPATIENT
Start: 2024-03-11 | End: 2024-03-11

## 2024-03-11 RX ADMIN — ALUMINUM HYDROXIDE, MAGNESIUM HYDROXIDE, AND DIMETHICONE 15 ML: 400; 400; 40 SUSPENSION ORAL at 20:37

## 2024-03-11 RX ADMIN — LIDOCAINE HYDROCHLORIDE 15 ML: 20 SOLUTION ORAL at 20:37

## 2024-03-11 RX ADMIN — ASPIRIN 325 MG: 325 TABLET ORAL at 20:00

## 2024-03-11 RX ADMIN — PHENOBARBITAL, HYOSCYAMINE SULFATE, ATROPINE SULFATE, SCOPOLAMINE HYDROBROMIDE 32.4 MG: 16.2; .1037; .0194; .0065 TABLET ORAL at 20:37

## 2024-03-12 LAB
QT INTERVAL: 426 MS
QTC INTERVAL: 482 MS
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
## 2024-03-12 NOTE — DISCHARGE INSTRUCTIONS
Call your cardiologist and primary care provider for follow-up.  Return to the ED if symptoms acutely worsen or change.

## 2024-03-14 ENCOUNTER — CLINICAL SUPPORT NO REQUIREMENTS (OUTPATIENT)
Dept: CARDIOLOGY | Facility: CLINIC | Age: 64
End: 2024-03-14
Payer: COMMERCIAL

## 2024-03-14 DIAGNOSIS — Z95.0 CARDIAC PACEMAKER IN SITU: Primary | ICD-10-CM

## 2024-03-21 DIAGNOSIS — I10 ESSENTIAL HYPERTENSION: ICD-10-CM

## 2024-03-21 DIAGNOSIS — E78.2 MIXED HYPERLIPIDEMIA: ICD-10-CM

## 2024-03-21 RX ORDER — ROSUVASTATIN CALCIUM 40 MG/1
40 TABLET, COATED ORAL DAILY
Qty: 30 TABLET | Refills: 1 | Status: SHIPPED | OUTPATIENT
Start: 2024-03-21

## 2024-03-21 RX ORDER — LISINOPRIL 5 MG/1
5 TABLET ORAL DAILY
Qty: 30 TABLET | Refills: 1 | Status: SHIPPED | OUTPATIENT
Start: 2024-03-21

## 2024-06-06 ENCOUNTER — OFFICE VISIT (OUTPATIENT)
Dept: CARDIOLOGY | Facility: CLINIC | Age: 64
End: 2024-06-06
Payer: MEDICARE

## 2024-06-06 VITALS
SYSTOLIC BLOOD PRESSURE: 105 MMHG | BODY MASS INDEX: 29.58 KG/M2 | DIASTOLIC BLOOD PRESSURE: 70 MMHG | OXYGEN SATURATION: 94 % | WEIGHT: 195.2 LBS | HEART RATE: 87 BPM | HEIGHT: 68 IN

## 2024-06-06 DIAGNOSIS — G47.33 OSA (OBSTRUCTIVE SLEEP APNEA): ICD-10-CM

## 2024-06-06 DIAGNOSIS — R06.02 SHORTNESS OF BREATH: ICD-10-CM

## 2024-06-06 DIAGNOSIS — I10 ESSENTIAL HYPERTENSION: ICD-10-CM

## 2024-06-06 DIAGNOSIS — G89.29 CHRONIC RIGHT SHOULDER PAIN: ICD-10-CM

## 2024-06-06 DIAGNOSIS — F17.210 CIGARETTE SMOKER: ICD-10-CM

## 2024-06-06 DIAGNOSIS — M25.511 CHRONIC RIGHT SHOULDER PAIN: ICD-10-CM

## 2024-06-06 DIAGNOSIS — I48.0 PAROXYSMAL ATRIAL FIBRILLATION: ICD-10-CM

## 2024-06-06 DIAGNOSIS — E11.9 TYPE 2 DIABETES MELLITUS WITHOUT COMPLICATION, WITHOUT LONG-TERM CURRENT USE OF INSULIN: Chronic | ICD-10-CM

## 2024-06-06 DIAGNOSIS — E78.2 MIXED HYPERLIPIDEMIA: ICD-10-CM

## 2024-06-06 DIAGNOSIS — Z95.0 PRESENCE OF CARDIAC PACEMAKER: Primary | ICD-10-CM

## 2024-06-06 RX ORDER — POTASSIUM CHLORIDE 20 MEQ/1
20 TABLET, EXTENDED RELEASE ORAL 2 TIMES DAILY
Qty: 4 TABLET | Refills: 1 | Status: SHIPPED | OUTPATIENT
Start: 2024-06-06

## 2024-06-06 RX ORDER — FUROSEMIDE 20 MG/1
20 TABLET ORAL DAILY
Qty: 90 TABLET | Refills: 1 | Status: SHIPPED | OUTPATIENT
Start: 2024-06-06

## 2024-06-06 RX ORDER — NITROGLYCERIN 0.4 MG/1
0.4 TABLET SUBLINGUAL
Qty: 90 TABLET | Refills: 1 | Status: SHIPPED | OUTPATIENT
Start: 2024-06-06

## 2024-06-06 RX ORDER — LISINOPRIL 5 MG/1
5 TABLET ORAL DAILY
Qty: 30 TABLET | Refills: 1 | Status: SHIPPED | OUTPATIENT
Start: 2024-06-06

## 2024-06-06 RX ORDER — METOPROLOL SUCCINATE 100 MG/1
100 TABLET, EXTENDED RELEASE ORAL DAILY
Qty: 90 TABLET | Refills: 1 | Status: SHIPPED | OUTPATIENT
Start: 2024-06-06

## 2024-06-06 RX ORDER — ROSUVASTATIN CALCIUM 40 MG/1
40 TABLET, COATED ORAL DAILY
Qty: 90 TABLET | Refills: 1 | Status: SHIPPED | OUTPATIENT
Start: 2024-06-06

## 2024-06-06 RX ORDER — ISOSORBIDE MONONITRATE 30 MG/1
30 TABLET, EXTENDED RELEASE ORAL DAILY
Qty: 90 TABLET | Refills: 1 | Status: SHIPPED | OUTPATIENT
Start: 2024-06-06
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
## 2024-06-13 ENCOUNTER — CLINICAL SUPPORT NO REQUIREMENTS (OUTPATIENT)
Dept: CARDIOLOGY | Facility: CLINIC | Age: 64
End: 2024-06-13
Payer: MEDICARE

## 2024-06-13 DIAGNOSIS — Z95.0 CARDIAC PACEMAKER IN SITU: Primary | ICD-10-CM

## 2024-06-13 PROCEDURE — 93280 PM DEVICE PROGR EVAL DUAL: CPT | Performed by: INTERNAL MEDICINE

## 2024-06-26 ENCOUNTER — OFFICE VISIT (OUTPATIENT)
Dept: PULMONOLOGY | Facility: CLINIC | Age: 64
End: 2024-06-26
Payer: MEDICARE

## 2024-06-26 VITALS
OXYGEN SATURATION: 96 % | DIASTOLIC BLOOD PRESSURE: 68 MMHG | WEIGHT: 195 LBS | HEART RATE: 78 BPM | TEMPERATURE: 98.3 F | HEIGHT: 68 IN | SYSTOLIC BLOOD PRESSURE: 112 MMHG | BODY MASS INDEX: 29.55 KG/M2

## 2024-06-26 DIAGNOSIS — R91.8 PULMONARY NODULES: ICD-10-CM

## 2024-06-26 DIAGNOSIS — R06.02 SHORTNESS OF BREATH: ICD-10-CM

## 2024-06-26 DIAGNOSIS — G47.33 OSA (OBSTRUCTIVE SLEEP APNEA): Primary | ICD-10-CM

## 2024-06-26 DIAGNOSIS — F17.210 CIGARETTE NICOTINE DEPENDENCE WITHOUT COMPLICATION: ICD-10-CM

## 2024-06-26 PROCEDURE — 99214 OFFICE O/P EST MOD 30 MIN: CPT | Performed by: NURSE PRACTITIONER

## 2024-06-26 PROCEDURE — 3078F DIAST BP <80 MM HG: CPT | Performed by: NURSE PRACTITIONER

## 2024-06-26 PROCEDURE — 1160F RVW MEDS BY RX/DR IN RCRD: CPT | Performed by: NURSE PRACTITIONER

## 2024-06-26 PROCEDURE — 1159F MED LIST DOCD IN RCRD: CPT | Performed by: NURSE PRACTITIONER

## 2024-06-26 PROCEDURE — G2211 COMPLEX E/M VISIT ADD ON: HCPCS | Performed by: NURSE PRACTITIONER

## 2024-06-26 PROCEDURE — 3074F SYST BP LT 130 MM HG: CPT | Performed by: NURSE PRACTITIONER

## 2024-06-26 NOTE — PROGRESS NOTES
"Chief Complaint  Sleep Apnea    Subjective        Lian Kang presents to [facility] PULMONARY & CRITICAL CARE MEDICINE  History of Present Illness    Ms. Kang is a 64 year old female with a medical history significant for anxiety and depression, asthma, diabetes, dyslipidemia, GERD, hypertension, Migraine, AMANDEEP, and PTSD.    She presents today for follow up on sleep apnea.  She reports that her shortness of breath is worse on exertion.  She reports that she is suppose to be on oxygen.  She is taking Spiriva once daily and albuterol as needed. She reports that her Spiriva does help. Sleep study showed an AHI of 4.8.  She does states that she had been diagnosed with sleep apnea in the past.  She has also underwent PFT.  She tells me that she was told that she had nodules on her lung many years ago but has never had another CT to look at them.      Objective   Vital Signs:  /68   Pulse 78   Temp 98.3 °F (36.8 °C)   Ht 172.7 cm (67.99\")   Wt 88.5 kg (195 lb)   SpO2 96%   BMI 29.66 kg/m²   Estimated body mass index is 29.66 kg/m² as calculated from the following:    Height as of this encounter: 172.7 cm (67.99\").    Weight as of this encounter: 88.5 kg (195 lb).       BMI is >= 25 and <30. (Overweight) The following options were offered after discussion;: exercise counseling/recommendations and nutrition counseling/recommendations      Physical Exam     GENERAL APPEARANCE: Well developed, well nourished, alert and cooperative, and appears to be in no acute distress.    HEAD: normocephalic. Atraumatic.    EYES: PERRL, EOMI. Vision is grossly intact.    THROAT: Oral cavity and pharynx normal. No inflammation, swelling, exudate, or lesions.     NECK: Neck supple.  No thyromegaly.    CARDIAC: Normal S1 and S2. No S3, S4 or murmurs. Rhythm is regular.     RESPIRATORY:Bilateral air entry positive. Bilateral diminished breath sounds. No wheezing, crackles or rhonchi noted.    GI: Positive bowel " sounds. Soft, nondistended, nontender.     MUSCULOSKELETAL: No significant deformity or joint abnormality. No edema. Peripheral pulses intact. No varicosities.    NEUROLOGICAL: Strength and sensation symmetric and intact throughout.     PSYCHIATRIC: The mental examination revealed the patient was oriented to person, place, and time.     Result Review :    The following data was reviewed by: ADRIAN Bright on 06/26/2024:  Common labs          3/11/2024    19:01   Common Labs   Glucose 105    BUN 21    Creatinine 1.02    Sodium 145    Potassium 3.9    Chloride 110    Calcium 9.2    Albumin 4.2    Total Bilirubin 0.2    Alkaline Phosphatase 65    AST (SGOT) 17    ALT (SGPT) 18    WBC 6.59    Hemoglobin 12.8    Hematocrit 39.6    Platelets 171                   Assessment and Plan     Diagnoses and all orders for this visit:    1. AMANDEEP (obstructive sleep apnea) (Primary)  -     Home Sleep Study; Future    2. Shortness of breath    3. Pulmonary nodules  -     CT Chest Without Contrast; Future    4. Cigarette nicotine dependence without complication    Other orders  -     tiotropium bromide-olodaterol (STIOLTO RESPIMAT) 2.5-2.5 MCG/ACT aerosol solution inhaler; Inhale 2 puffs Daily.  Dispense: 4 g; Refill: 5          PFT was noted to be normal.  As Spiriva provided symptomatic relief,  I will change it to Stiolto in hopes to increase symptomatic relief.  Continue albuterol as needed.    Alpha-1 antitrypsin genotype was normal.      Lian Kang  reports that she has been smoking cigarettes. She started smoking about 4 years ago. She has a 1.1 pack-year smoking history. She has never used smokeless tobacco. I have educated her on the risk of diseases from using tobacco products such as cancer, COPD, and heart disease.     I advised her to quit and she is not willing to quit.      Sleep study was completed.  Notable for an of AHI 4.8.  sleep apnea can be missed on 10% of initial sleep studies.  Since her AHI is  borderline we will obtain another sleep study.    Ordered CT Chest to assess pulmonary nodules.         Follow Up     Return in about 3 months (around 9/26/2024).  Patient was given instructions and counseling regarding her condition or for health maintenance advice. Please see specific information pulled into the AVS if appropriate.

## 2024-07-02 DIAGNOSIS — M25.511 RIGHT SHOULDER PAIN, UNSPECIFIED CHRONICITY: Primary | ICD-10-CM

## 2024-07-25 ENCOUNTER — HOSPITAL ENCOUNTER (OUTPATIENT)
Dept: CT IMAGING | Facility: HOSPITAL | Age: 64
Discharge: HOME | End: 2024-07-25
Admitting: NURSE PRACTITIONER
Payer: MEDICARE

## 2024-07-25 DIAGNOSIS — R91.8 PULMONARY NODULES: ICD-10-CM

## 2024-07-25 PROCEDURE — 71250 CT THORAX DX C-: CPT | Performed by: RADIOLOGY

## 2024-07-25 PROCEDURE — 71250 CT THORAX DX C-: CPT

## 2024-07-29 ENCOUNTER — OFFICE VISIT (OUTPATIENT)
Dept: ORTHOPEDIC SURGERY | Facility: CLINIC | Age: 64
End: 2024-07-29
Payer: MEDICARE

## 2024-07-29 ENCOUNTER — HOSPITAL ENCOUNTER (OUTPATIENT)
Dept: GENERAL RADIOLOGY | Facility: HOSPITAL | Age: 64
Discharge: HOME | End: 2024-07-29
Admitting: ORTHOPAEDIC SURGERY
Payer: MEDICARE

## 2024-07-29 VITALS
SYSTOLIC BLOOD PRESSURE: 119 MMHG | DIASTOLIC BLOOD PRESSURE: 76 MMHG | HEIGHT: 68 IN | BODY MASS INDEX: 29.57 KG/M2 | WEIGHT: 195.11 LBS | HEART RATE: 71 BPM

## 2024-07-29 DIAGNOSIS — M54.2 NECK PAIN: ICD-10-CM

## 2024-07-29 DIAGNOSIS — M54.2 NECK PAIN: Primary | ICD-10-CM

## 2024-07-29 DIAGNOSIS — M25.511 RIGHT SHOULDER PAIN, UNSPECIFIED CHRONICITY: ICD-10-CM

## 2024-07-29 DIAGNOSIS — M50.90 DISORDER OF INTERVERTEBRAL DISC OF CERVICAL SPINE: ICD-10-CM

## 2024-07-29 PROCEDURE — 73030 X-RAY EXAM OF SHOULDER: CPT

## 2024-07-29 PROCEDURE — 73030 X-RAY EXAM OF SHOULDER: CPT | Performed by: RADIOLOGY

## 2024-07-29 PROCEDURE — 72040 X-RAY EXAM NECK SPINE 2-3 VW: CPT

## 2024-07-29 PROCEDURE — 72040 X-RAY EXAM NECK SPINE 2-3 VW: CPT | Performed by: RADIOLOGY

## 2024-07-29 RX ADMIN — METHYLPREDNISOLONE ACETATE 40 MG: 40 INJECTION, SUSPENSION INTRA-ARTICULAR; INTRALESIONAL; INTRAMUSCULAR; SOFT TISSUE at 11:49

## 2024-07-29 RX ADMIN — LIDOCAINE HYDROCHLORIDE 10 ML: 10 INJECTION, SOLUTION EPIDURAL; INFILTRATION; INTRACAUDAL; PERINEURAL at 11:49
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
## 2024-08-03 RX ORDER — METHYLPREDNISOLONE ACETATE 40 MG/ML
40 INJECTION, SUSPENSION INTRA-ARTICULAR; INTRALESIONAL; INTRAMUSCULAR; SOFT TISSUE
Status: COMPLETED | OUTPATIENT
Start: 2024-07-29 | End: 2024-07-29

## 2024-08-03 RX ORDER — LIDOCAINE HYDROCHLORIDE 10 MG/ML
10 INJECTION, SOLUTION EPIDURAL; INFILTRATION; INTRACAUDAL; PERINEURAL
Status: COMPLETED | OUTPATIENT
Start: 2024-07-29 | End: 2024-07-29

## 2024-08-13 ENCOUNTER — DOCUMENTATION (OUTPATIENT)
Dept: PULMONOLOGY | Facility: CLINIC | Age: 64
End: 2024-08-13
Payer: MEDICARE

## 2024-08-13 DIAGNOSIS — R06.02 SHORTNESS OF BREATH: Primary | ICD-10-CM

## 2024-08-13 DIAGNOSIS — G47.33 OSA (OBSTRUCTIVE SLEEP APNEA): ICD-10-CM

## 2024-08-15 DIAGNOSIS — R91.8 PULMONARY NODULES: Primary | ICD-10-CM

## 2024-08-15 NOTE — PROGRESS NOTES
Discussed CT Chest with the patient.  She has one pulmonary nodule in the right lung that is noted to be stable.  She is noted to have a new nodule in the left upper lobe that is believed to have been difficult to see on previous imaging due to pneumonia.  Given her smoking history I would like to repeat a CT Chest in 6 months for evaluation of stability.

## 2024-08-19 ENCOUNTER — TELEPHONE (OUTPATIENT)
Dept: PULMONOLOGY | Facility: CLINIC | Age: 64
End: 2024-08-19
Payer: MEDICARE

## 2024-08-19 DIAGNOSIS — G47.33 OSA (OBSTRUCTIVE SLEEP APNEA): Primary | ICD-10-CM

## 2024-08-19 NOTE — TELEPHONE ENCOUNTER
"\"Called and spoke with patients . Reported that patient has mild sleep apnea and will require use of an autopap at night. Order has been placed and sent to DME for immediate setup. \"                 "

## 2024-08-20 ENCOUNTER — TELEPHONE (OUTPATIENT)
Dept: CARDIOLOGY | Facility: CLINIC | Age: 64
End: 2024-08-20
Payer: MEDICARE

## 2024-08-20 NOTE — TELEPHONE ENCOUNTER
Multiple missed transmissions, no upcoming office visits, has cancelled multiple previous appointments. Called patient to see if she is receiving care somewhere else. No answer, unable to leave message. Made inactive in [employer]

## 2024-08-21 DIAGNOSIS — I10 ESSENTIAL HYPERTENSION: ICD-10-CM

## 2024-08-21 RX ORDER — LISINOPRIL 5 MG/1
5 TABLET ORAL DAILY
Qty: 30 TABLET | Refills: 4 | Status: SHIPPED | OUTPATIENT
Start: 2024-08-21

## 2024-10-03 ENCOUNTER — TELEPHONE (OUTPATIENT)
Dept: CARDIOLOGY | Facility: CLINIC | Age: 64
End: 2024-10-03
Payer: MEDICARE

## 2024-10-04 NOTE — TELEPHONE ENCOUNTER
Cardiac risk assessment for right cervical lymph node BX.     LS 6/6/24  F/up 12/09/24  Stress 9/1/23  Echo 3/24/23    Form filled out and placed on Rama's desk.

## 2024-10-24 ENCOUNTER — OFFICE VISIT (OUTPATIENT)
Dept: PULMONOLOGY | Facility: CLINIC | Age: 64
End: 2024-10-24
Payer: MEDICARE

## 2024-10-24 VITALS
WEIGHT: 178.6 LBS | BODY MASS INDEX: 27.07 KG/M2 | RESPIRATION RATE: 18 BRPM | OXYGEN SATURATION: 98 % | HEIGHT: 68 IN | DIASTOLIC BLOOD PRESSURE: 68 MMHG | HEART RATE: 74 BPM | SYSTOLIC BLOOD PRESSURE: 80 MMHG

## 2024-10-24 DIAGNOSIS — G47.33 OSA (OBSTRUCTIVE SLEEP APNEA): Primary | ICD-10-CM

## 2024-10-24 DIAGNOSIS — R91.8 PULMONARY NODULES: ICD-10-CM

## 2024-10-24 DIAGNOSIS — F17.210 CIGARETTE NICOTINE DEPENDENCE WITHOUT COMPLICATION: ICD-10-CM

## 2024-10-24 NOTE — PROGRESS NOTES
"Chief Complaint  Sleep Apnea    Subjective          Lian Kang presents to [facility] for   History of Present Illness    Ms. Kang is a 64 year old female with a medical history significant for anxiety, depression, diabetes, GERD, Migraine, and sleep apnea.    She presents today for follow up on sleep apnea.  She recently underwent a home sleep study that showed mild sleep apnea. She was started on an autopap and reports that she is using it nightly. She does not benefit from use.    Objective   Vital Signs:   BP (!) 80/68 (BP Location: Right arm, Patient Position: Sitting, Cuff Size: Adult)   Pulse 74   Resp 18   Ht 172.7 cm (67.99\")   Wt 81 kg (178 lb 9.6 oz)   SpO2 98%   BMI 27.16 kg/m²         Physical Exam    Estimated body mass index is 27.16 kg/m² as calculated from the following:    Height as of this encounter: 172.7 cm (67.99\").    Weight as of this encounter: 81 kg (178 lb 9.6 oz).        Result Review :   The following data was reviewed by: ADRIAN Bright on 10/24/2024:  Common labs          3/11/2024    19:01   Common Labs   Glucose 105    BUN 21    Creatinine 1.02    Sodium 145    Potassium 3.9    Chloride 110    Calcium 9.2    Albumin 4.2    Total Bilirubin 0.2    Alkaline Phosphatase 65    AST (SGOT) 17    ALT (SGPT) 18    WBC 6.59    Hemoglobin 12.8    Hematocrit 39.6    Platelets 171           PFT:9/1/23  Normal spirometry  with no significant bronchodilator response seen on this occasion. Diffusion capacity is normal. Lung volumes are normal. Spirometry is normal.          Low dose lung cancer screening:NA    Previous chest imaging:    CT Chest 7/25/24  FINDINGS:    Lungs and pleural spaces:  4 mm subpleural nodule left upper lobe,  image #29, may be new or not seen previously due to airspace disease in  this location.  5.5 mm pleural-based nodule right lower lobe, image #77,  stable from previous.  No consolidation.  No " "significant effusion.    Heart:  Unremarkable as visualized.  No cardiomegaly.  No significant  pericardial effusion.  No significant coronary artery calcifications.    Mediastinum:  Unremarkable as visualized.  No mediastinal, hilar, or  axillary adenopathy.    Thyroid:  Thyroid nodule right lobe stable from previous.    Bones/joints:  Stable appearance of the bony structures.  No acute  fracture.  No dislocation.    Soft tissues:  Unremarkable as visualized.    Vasculature:  Unremarkable as visualized.  No thoracic aortic  aneurysm.    Lymph nodes:  See above.    Kidneys and ureters:  Right renal cyst.    Tubes, lines and devices:  Left cardiac pacer device.     IMPRESSION:  1.  Stable right lower lobe pulmonary nodule and now visualized left  upper lobe pulmonary nodule both which are less than 6 mm in size.  2.  Other incidental/nonacute findings as above.  3.  Fleischner Society Guidelines for low-risk or high-risk patients  recommend that one should consider chest CT at 12 months due to the  morphology and/or location of this nodule.        This report was finalized on 7/25/2024 11:14 AM by Dr. Javon Del Toro MD.      Alpha-1 antitrypsin screening:NA    STOP-Bang Score:   NA  Page Sleepiness Scale:   NA      ABG:    pH No results found for: \"PHART\"   pO2 No results found for: \"PO2ART\"   pCO2 No results found for: \"MQS3RUR\"   HCO3 No results found for: \"IVA3JCA\"                      Assessment and Plan    Problem List Items Addressed This Visit          Sleep    AMANDEEP (obstructive sleep apnea) - Primary     Other Visit Diagnoses       Pulmonary nodules        Cigarette nicotine dependence without complication                Lian Kang  reports that she has been smoking cigarettes. She started smoking about 4 years ago. She has a 1.2 pack-year smoking history. She has never used smokeless tobacco. I have educated her on the risk of diseases from using tobacco products such as cancer, COPD, and heart " disease.     I advised her to quit and she is not willing to quit.    Most recent home sleep study showed mild sleep apnea.  She was started on an autopap nightly.  She notes benefit from use.      CT Chest was reviewed.  Pulmonary nodules were noted to be stable.  There is a new nodule noted in the left upper lobe.  We will plan to repeat CT Chest in 6 months.        Follow Up   Return in about 6 months (around 4/24/2025).  Patient was given instructions and counseling regarding her condition or for health maintenance advice. Please see specific information pulled into the AVS if appropriate.

## 2024-11-15 ENCOUNTER — TELEPHONE (OUTPATIENT)
Dept: CARDIOLOGY | Facility: CLINIC | Age: 64
End: 2024-11-15
Payer: MEDICARE

## 2024-11-15 NOTE — TELEPHONE ENCOUNTER
REQUEST FOR CARDIAC CLEARANCE    Caller name: Lian Kang     Phone Number: [phone]    Surgeon's name: UNKNOWN    Type of planned surgery: BONE BIOPSY     Date of planned surgery: ASAP    Type of anesthesia: LOCAL    Have you been experiencing chest pain or shortness of breath? NO    Is your doctor requesting for you to stop any of your medications prior to your surgery? STOP XARELTO  7 DAYS PRIOR AND BRIDGE WITH LOVANOX    Where should we fax the clearance to? [phone]

## 2024-11-18 ENCOUNTER — TELEPHONE (OUTPATIENT)
Dept: CARDIOLOGY | Facility: CLINIC | Age: 64
End: 2024-11-18
Payer: MEDICARE

## 2024-11-18 NOTE — TELEPHONE ENCOUNTER
Cardiac Risk Assessment  Procedure: CT guide bone marrow biopsy   LS: 6/6/24  F/up: 12/9/24  Stress: 9/1/23  Echo: 3/24/23         Form filled out and placed on Rama's desk.

## 2024-11-19 NOTE — TELEPHONE ENCOUNTER
Hub to relay.     Called pt to advise her that  has signed off on her procedure. She will need to hold her xarelto 2-3 days prior. She expressed understanding.

## 2024-12-01 DIAGNOSIS — E78.2 MIXED HYPERLIPIDEMIA: ICD-10-CM

## 2024-12-01 DIAGNOSIS — I48.0 PAROXYSMAL ATRIAL FIBRILLATION: ICD-10-CM

## 2024-12-01 DIAGNOSIS — I10 ESSENTIAL HYPERTENSION: ICD-10-CM

## 2024-12-02 RX ORDER — METOPROLOL SUCCINATE 100 MG/1
100 TABLET, EXTENDED RELEASE ORAL DAILY
Qty: 90 TABLET | Refills: 1 | Status: SHIPPED | OUTPATIENT
Start: 2024-12-02 | End: 2024-12-09 | Stop reason: SDUPTHER

## 2024-12-02 RX ORDER — FUROSEMIDE 20 MG/1
20 TABLET ORAL DAILY
Qty: 90 TABLET | Refills: 1 | Status: SHIPPED | OUTPATIENT
Start: 2024-12-02 | End: 2024-12-09 | Stop reason: SDUPTHER

## 2024-12-02 RX ORDER — ISOSORBIDE MONONITRATE 30 MG/1
30 TABLET, EXTENDED RELEASE ORAL DAILY
Qty: 90 TABLET | Refills: 1 | Status: SHIPPED | OUTPATIENT
Start: 2024-12-02 | End: 2024-12-09 | Stop reason: SDUPTHER

## 2024-12-02 RX ORDER — ROSUVASTATIN CALCIUM 40 MG/1
40 TABLET, COATED ORAL DAILY
Qty: 90 TABLET | Refills: 1 | Status: SHIPPED | OUTPATIENT
Start: 2024-12-02 | End: 2024-12-09 | Stop reason: SDUPTHER

## 2024-12-02 RX ORDER — RIVAROXABAN 20 MG/1
20 TABLET, FILM COATED ORAL DAILY
Qty: 90 TABLET | Refills: 1 | Status: SHIPPED | OUTPATIENT
Start: 2024-12-02 | End: 2024-12-09 | Stop reason: SDUPTHER

## 2024-12-09 ENCOUNTER — OFFICE VISIT (OUTPATIENT)
Dept: CARDIOLOGY | Facility: CLINIC | Age: 64
End: 2024-12-09
Payer: MEDICARE

## 2024-12-09 VITALS
HEIGHT: 68 IN | OXYGEN SATURATION: 96 % | WEIGHT: 188.4 LBS | DIASTOLIC BLOOD PRESSURE: 72 MMHG | SYSTOLIC BLOOD PRESSURE: 103 MMHG | HEART RATE: 107 BPM | BODY MASS INDEX: 28.55 KG/M2

## 2024-12-09 DIAGNOSIS — F17.210 CIGARETTE SMOKER: ICD-10-CM

## 2024-12-09 DIAGNOSIS — I48.0 PAROXYSMAL ATRIAL FIBRILLATION: ICD-10-CM

## 2024-12-09 DIAGNOSIS — R06.02 SHORTNESS OF BREATH: Primary | ICD-10-CM

## 2024-12-09 DIAGNOSIS — E78.2 MIXED HYPERLIPIDEMIA: ICD-10-CM

## 2024-12-09 DIAGNOSIS — I10 ESSENTIAL HYPERTENSION: ICD-10-CM

## 2024-12-09 DIAGNOSIS — Z95.0 PRESENCE OF CARDIAC PACEMAKER: ICD-10-CM

## 2024-12-09 DIAGNOSIS — G47.33 OSA (OBSTRUCTIVE SLEEP APNEA): ICD-10-CM

## 2024-12-09 PROCEDURE — 99214 OFFICE O/P EST MOD 30 MIN: CPT | Performed by: SPECIALIST

## 2024-12-09 PROCEDURE — 3074F SYST BP LT 130 MM HG: CPT | Performed by: SPECIALIST

## 2024-12-09 PROCEDURE — 3078F DIAST BP <80 MM HG: CPT | Performed by: SPECIALIST

## 2024-12-09 RX ORDER — LISINOPRIL 5 MG/1
5 TABLET ORAL DAILY
Qty: 30 TABLET | Refills: 4 | Status: SHIPPED | OUTPATIENT
Start: 2024-12-09

## 2024-12-09 RX ORDER — FUROSEMIDE 20 MG/1
20 TABLET ORAL DAILY
Qty: 90 TABLET | Refills: 1 | Status: SHIPPED | OUTPATIENT
Start: 2024-12-09

## 2024-12-09 RX ORDER — ISOSORBIDE MONONITRATE 30 MG/1
30 TABLET, EXTENDED RELEASE ORAL DAILY
Qty: 90 TABLET | Refills: 1 | Status: SHIPPED | OUTPATIENT
Start: 2024-12-09

## 2024-12-09 RX ORDER — METOPROLOL SUCCINATE 100 MG/1
100 TABLET, EXTENDED RELEASE ORAL DAILY
Qty: 90 TABLET | Refills: 1 | Status: SHIPPED | OUTPATIENT
Start: 2024-12-09

## 2024-12-09 RX ORDER — ROSUVASTATIN CALCIUM 40 MG/1
40 TABLET, COATED ORAL DAILY
Qty: 90 TABLET | Refills: 1 | Status: SHIPPED | OUTPATIENT
Start: 2024-12-09

## 2024-12-09 RX ORDER — POTASSIUM CHLORIDE 1500 MG/1
20 TABLET, EXTENDED RELEASE ORAL 2 TIMES DAILY
Qty: 4 TABLET | Refills: 1 | Status: SHIPPED | OUTPATIENT
Start: 2024-12-09
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
## 2024-12-26 ENCOUNTER — HOSPITAL ENCOUNTER (OUTPATIENT)
Dept: CARDIOLOGY | Facility: HOSPITAL | Age: 64
Discharge: HOME | End: 2024-12-26
Admitting: SPECIALIST
Payer: MEDICARE

## 2024-12-26 DIAGNOSIS — I48.0 PAROXYSMAL ATRIAL FIBRILLATION: ICD-10-CM

## 2024-12-26 DIAGNOSIS — R06.02 SHORTNESS OF BREATH: ICD-10-CM

## 2024-12-26 PROCEDURE — 93306 TTE W/DOPPLER COMPLETE: CPT

## 2024-12-27 LAB
AV MEAN PRESS GRAD SYS DOP V1V2: 3 MMHG
AV VMAX SYS DOP: 112 CM/SEC
BH CV ECHO MEAS - AO MAX PG: 5 MMHG
BH CV ECHO MEAS - AO ROOT DIAM: 3 CM
BH CV ECHO MEAS - AO V2 VTI: 22 CM
BH CV ECHO MEAS - EDV(CUBED): 79.5 ML
BH CV ECHO MEAS - EDV(MOD-SP4): 42.9 ML
BH CV ECHO MEAS - EF(MOD-SP4): 66.7 %
BH CV ECHO MEAS - ESV(CUBED): 35.9 ML
BH CV ECHO MEAS - ESV(MOD-SP4): 14.3 ML
BH CV ECHO MEAS - FS: 23.3 %
BH CV ECHO MEAS - IVS/LVPW: 1.09 CM
BH CV ECHO MEAS - IVSD: 1.2 CM
BH CV ECHO MEAS - LA DIMENSION: 4.3 CM
BH CV ECHO MEAS - LAT PEAK E' VEL: 10.7 CM/SEC
BH CV ECHO MEAS - LV DIASTOLIC VOL/BSA (35-75): 21.8 CM2
BH CV ECHO MEAS - LV MASS(C)D: 173.6 GRAMS
BH CV ECHO MEAS - LV SYSTOLIC VOL/BSA (12-30): 7.3 CM2
BH CV ECHO MEAS - LVIDD: 4.3 CM
BH CV ECHO MEAS - LVIDS: 3.3 CM
BH CV ECHO MEAS - LVOT AREA: 2.5 CM2
BH CV ECHO MEAS - LVOT DIAM: 1.8 CM
BH CV ECHO MEAS - LVPWD: 1.1 CM
BH CV ECHO MEAS - MED PEAK E' VEL: 7.1 CM/SEC
BH CV ECHO MEAS - MV A MAX VEL: 59.2 CM/SEC
BH CV ECHO MEAS - MV E MAX VEL: 57.7 CM/SEC
BH CV ECHO MEAS - MV E/A: 0.97
BH CV ECHO MEAS - PA ACC TIME: 0.1 SEC
BH CV ECHO MEAS - RAP SYSTOLE: 10 MMHG
BH CV ECHO MEAS - RVSP: 31.7 MMHG
BH CV ECHO MEAS - SV(MOD-SP4): 28.6 ML
BH CV ECHO MEAS - SVI(MOD-SP4): 14.5 ML/M2
BH CV ECHO MEAS - TAPSE (>1.6): 1.64 CM
BH CV ECHO MEAS - TR MAX PG: 21.7 MMHG
BH CV ECHO MEAS - TR MAX VEL: 233 CM/SEC
BH CV ECHO MEASUREMENTS AVERAGE E/E' RATIO: 6.48
LEFT ATRIUM VOLUME INDEX: 15.6 ML/M2

## 2025-02-03 RX ORDER — TIOTROPIUM BROMIDE AND OLODATEROL 3.124; 2.736 UG/1; UG/1
SPRAY, METERED RESPIRATORY (INHALATION)
Qty: 12 G | Refills: 1 | Status: SHIPPED | OUTPATIENT
Start: 2025-02-03

## 2025-02-12 ENCOUNTER — CLINICAL SUPPORT NO REQUIREMENTS (OUTPATIENT)
Dept: CARDIOLOGY | Facility: CLINIC | Age: 65
End: 2025-02-12
Payer: MEDICARE

## 2025-02-12 DIAGNOSIS — Z95.0 CARDIAC PACEMAKER IN SITU: Primary | ICD-10-CM

## 2025-03-13 ENCOUNTER — OFFICE VISIT (OUTPATIENT)
Dept: CARDIOLOGY | Facility: CLINIC | Age: 65
End: 2025-03-13
Payer: MEDICARE

## 2025-03-13 VITALS
HEART RATE: 71 BPM | OXYGEN SATURATION: 95 % | WEIGHT: 180.4 LBS | HEIGHT: 67 IN | RESPIRATION RATE: 16 BRPM | SYSTOLIC BLOOD PRESSURE: 95 MMHG | DIASTOLIC BLOOD PRESSURE: 60 MMHG | BODY MASS INDEX: 28.31 KG/M2

## 2025-03-13 DIAGNOSIS — Z95.0 PRESENCE OF CARDIAC PACEMAKER: ICD-10-CM

## 2025-03-13 DIAGNOSIS — E11.9 TYPE 2 DIABETES MELLITUS WITHOUT COMPLICATION, WITHOUT LONG-TERM CURRENT USE OF INSULIN: Chronic | ICD-10-CM

## 2025-03-13 DIAGNOSIS — I48.0 PAROXYSMAL ATRIAL FIBRILLATION: ICD-10-CM

## 2025-03-13 DIAGNOSIS — Z86.79 H/O VALVULAR HEART DISEASE: Chronic | ICD-10-CM

## 2025-03-13 DIAGNOSIS — I10 ESSENTIAL HYPERTENSION: Primary | ICD-10-CM

## 2025-03-13 DIAGNOSIS — R06.02 SHORTNESS OF BREATH: ICD-10-CM

## 2025-03-13 DIAGNOSIS — G47.33 OSA (OBSTRUCTIVE SLEEP APNEA): ICD-10-CM

## 2025-03-13 DIAGNOSIS — E78.2 MIXED HYPERLIPIDEMIA: ICD-10-CM

## 2025-03-13 DIAGNOSIS — F17.210 CIGARETTE SMOKER: ICD-10-CM

## 2025-03-13 RX ORDER — LISINOPRIL 5 MG/1
5 TABLET ORAL DAILY
Qty: 30 TABLET | Refills: 4 | Status: SHIPPED | OUTPATIENT
Start: 2025-03-13

## 2025-03-13 RX ORDER — NITROGLYCERIN 0.4 MG/1
0.4 TABLET SUBLINGUAL
Qty: 90 TABLET | Refills: 1 | Status: SHIPPED | OUTPATIENT
Start: 2025-03-13

## 2025-03-13 RX ORDER — FUROSEMIDE 20 MG/1
20 TABLET ORAL DAILY
Qty: 90 TABLET | Refills: 1 | Status: SHIPPED | OUTPATIENT
Start: 2025-03-13

## 2025-03-13 RX ORDER — PANTOPRAZOLE SODIUM 40 MG/1
40 TABLET, DELAYED RELEASE ORAL DAILY
Qty: 90 TABLET | Refills: 1 | Status: SHIPPED | OUTPATIENT
Start: 2025-03-13

## 2025-03-13 RX ORDER — POTASSIUM CHLORIDE 1500 MG/1
20 TABLET, EXTENDED RELEASE ORAL 2 TIMES DAILY
Qty: 4 TABLET | Refills: 1 | Status: SHIPPED | OUTPATIENT
Start: 2025-03-13

## 2025-03-13 RX ORDER — ROSUVASTATIN CALCIUM 40 MG/1
40 TABLET, COATED ORAL DAILY
Qty: 90 TABLET | Refills: 1 | Status: SHIPPED | OUTPATIENT
Start: 2025-03-13

## 2025-03-13 RX ORDER — METOPROLOL SUCCINATE 100 MG/1
100 TABLET, EXTENDED RELEASE ORAL DAILY
Qty: 90 TABLET | Refills: 1 | Status: SHIPPED | OUTPATIENT
Start: 2025-03-13
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
## 2025-03-18 ENCOUNTER — HOSPITAL ENCOUNTER (OUTPATIENT)
Dept: CT IMAGING | Facility: HOSPITAL | Age: 65
Discharge: HOME | End: 2025-03-18
Admitting: NURSE PRACTITIONER
Payer: MEDICARE

## 2025-03-18 ENCOUNTER — DOCUMENTATION (OUTPATIENT)
Dept: PULMONOLOGY | Facility: CLINIC | Age: 65
End: 2025-03-18
Payer: MEDICARE

## 2025-03-18 DIAGNOSIS — R91.8 PULMONARY NODULES: ICD-10-CM

## 2025-03-18 PROCEDURE — 71250 CT THORAX DX C-: CPT

## 2025-03-18 NOTE — LETTER
Lian Kang  [address] [facility] [address]    March 18, 2025     Dear Ms. Kang:    Below are the results from your recent visit:    Noticed CT chest for lung cancer screening was reviewed.  Pulmonary nodule is noted to be stable in size.  No new pulmonary nodules were noted.  We will plan to repeat CT chest in 1 year per screening guidelines.    If you have any questions or concerns, please don't hesitate to call.         Sincerely,        ADRIAN Bright

## 2025-03-18 NOTE — PROGRESS NOTES
Low-dose CT chest was reviewed.  Lung RADS category 2.  Previously noted pulmonary nodules were noted to be stable in size.  We will plan to repeat CT chest in 1 year.

## 2025-04-24 ENCOUNTER — OFFICE VISIT (OUTPATIENT)
Dept: PULMONOLOGY | Facility: CLINIC | Age: 65
End: 2025-04-24
Payer: MEDICARE

## 2025-04-24 VITALS
HEIGHT: 67 IN | HEART RATE: 94 BPM | OXYGEN SATURATION: 95 % | SYSTOLIC BLOOD PRESSURE: 120 MMHG | DIASTOLIC BLOOD PRESSURE: 76 MMHG | BODY MASS INDEX: 29.22 KG/M2 | WEIGHT: 186.2 LBS | TEMPERATURE: 97.4 F

## 2025-04-24 DIAGNOSIS — J44.9 CHRONIC OBSTRUCTIVE PULMONARY DISEASE, UNSPECIFIED COPD TYPE: Primary | ICD-10-CM

## 2025-04-24 DIAGNOSIS — G47.33 OSA (OBSTRUCTIVE SLEEP APNEA): ICD-10-CM

## 2025-04-24 DIAGNOSIS — F17.210 CIGARETTE NICOTINE DEPENDENCE WITHOUT COMPLICATION: ICD-10-CM

## 2025-04-24 DIAGNOSIS — R91.8 PULMONARY NODULES: ICD-10-CM

## 2025-04-24 RX ORDER — BUDESONIDE, GLYCOPYRROLATE, AND FORMOTEROL FUMARATE 160; 9; 4.8 UG/1; UG/1; UG/1
2 AEROSOL, METERED RESPIRATORY (INHALATION) 2 TIMES DAILY
Qty: 10.7 G | Refills: 5 | Status: SHIPPED | OUTPATIENT
Start: 2025-04-24

## 2025-04-24 NOTE — PROGRESS NOTES
"Chief Complaint  Sleep Apnea    Subjective          Lian Kang presents to [facility] for   History of Present Illness    Ms. Kang is a 65 year old female with a medical history significant for anxiety, depression, asthma, diabetes, hyperlipidemia, GERD, hypertension, migraine, and AMANDEEP.    She presents today for follow up on pulmonary nodules and sleep apnea.  She reports that overall she has been doing well.  She states that she does continue to have shortness of breath despite the use of Stiolto and albuterol inhalers.  She is compliant with use of cpap at night.  She remains a current smoker.    Objective   Vital Signs:   /76   Pulse 94   Temp 97.4 °F (36.3 °C)   Ht 170.2 cm (67.01\")   Wt 84.5 kg (186 lb 3.2 oz)   SpO2 95%   BMI 29.16 kg/m²         Physical Exam    GENERAL APPEARANCE: Well developed, well nourished, alert and cooperative, and appears to be in no acute distress.    HEAD: normocephalic. Atraumatic.    EYES: PERRL, EOMI. Vision is grossly intact.    THROAT: Oral cavity and pharynx normal. No inflammation, swelling, exudate, or lesions.     NECK: Neck supple.  No thyromegaly.    CARDIAC: Normal S1 and S2. No S3, S4 or murmurs. Rhythm is regular.     RESPIRATORY:Bilateral air entry positive. No wheezing, crackles or rhonchi noted.    GI: Positive bowel sounds. Soft, nondistended, nontender.     MUSCULOSKELETAL: No significant deformity or joint abnormality. No edema. Peripheral pulses intact. No varicosities.    NEUROLOGICAL: Strength and sensation symmetric and intact throughout.     PSYCHIATRIC: The mental examination revealed the patient was oriented to person, place, and time.       Estimated body mass index is 29.16 kg/m² as calculated from the following:    Height as of this encounter: 170.2 cm (67.01\").    Weight as of this encounter: 84.5 kg (186 lb 3.2 oz).        Result Review :   The following data was reviewed by: [place] " "Kika Rubio, ADRIAN on 04/24/2025:         PFT:9/1/23  Normal spirometry with no significant bronchodilator response seen on this occasion. Diffusion capacity is normal. Lung volumes are normal. Spirometry is normal.       Low dose lung cancer screening:NA    Previous chest imaging:    CT Chest 3/18/25      FINDINGS:    Lungs and pleural spaces:  1.3 mm pulmonary nodule left upper lobe,  image #27, stable from previous exam.  5.3 mm pleural-based nodule right  lower lobe, image #80, stable from previous.  Linear scarring or  atelectasis right lower lobe is noted.  No pneumothorax.  No new  pulmonary nodules or masses have developed.  No effusions.    Heart:  Unremarkable.  No cardiomegaly.  No significant coronary  artery calcifications.  No pericardial effusion.    Mediastinum:  Unremarkable.  No hilar or mediastinal lymphadenopathy.    Thyroid:  Stable nodule right lobe thyroid. Follow-up with ultrasound.    Bones/joints:  Stable appearance of the bony structures. No acute bony  findings.    Soft tissues:  Unremarkable.    Vasculature:  Unremarkable.  No thoracic aortic aneurysm.    Lymph nodes:  See above.    Tubes, lines and devices:  Left cardiac pacer device.  Left-sided  cardiac pacer device noted.     IMPRESSION:  1.  1.3 mm pulmonary nodule left upper lobe, image #27, stable from  previous exam.  2.  5.3 mm pleural-based nodule right lower lobe, image #80, stable from  previous.  3.  Stable nodule right lobe thyroid. Follow-up with ultrasound.  4. No new pulmonary nodules or masses have developed.     ASSESSMENT:    Lung-RADS score: 2 - Benign Appearance or Behavior.  Recommend  continued annual screening with a low-dose CT (LDCT) in 12 months.     This report was finalized on 3/18/2025 1:29 PM by Dr. Javon Del Toro MD.      Alpha-1 antitrypsin screening:NA    STOP-Bang Score:   NA  [place] Sleepiness Scale:   NA      ABG:    pH No results found for: \"PHART\"   pO2 No results found for: \"PO2ART\"   pCO2 No " "results found for: \"HUB7NCC\"   HCO3 No results found for: \"ZKZ7ELB\"                      Assessment and Plan    Problem List Items Addressed This Visit          Sleep    AMANDEEP (obstructive sleep apnea)     Other Visit Diagnoses         Chronic obstructive pulmonary disease, unspecified COPD type    -  Primary    Relevant Medications    Budeson-Glycopyrrol-Formoterol (Breztri Aerosphere) 160-9-4.8 MCG/ACT aerosol inhaler      Pulmonary nodules        Relevant Medications    Budeson-Glycopyrrol-Formoterol (Breztri Aerosphere) 160-9-4.8 MCG/ACT aerosol inhaler      Cigarette nicotine dependence without complication                Lian Kang  reports that she has been smoking cigarettes. She has never used smokeless tobacco. I have educated her on the risk of diseases from using tobacco products such as cancer, COPD, and heart disease.     I advised her to quit and she is not willing to quit.    Home sleep study was notable for mild sleep apnea.  She is using autopap nightly.  She notes benefit from use.   Patient was educated on positive airway pressure treatment.  As per CMS guidelines, more than 4 hours on 70% of observed nights is considered adherence. Patient was strongly encouraged to use CPAP as much as possible during sleep as more CPAP use is equal to more benefit. Use of heated humidification in positive airway pressure treatment to improve the adherence to the device.  In case of claustrophobia, we will provide the patient cognitive behavioral therapy and desensitization. Oral appliances use will be discussed with the patient in case of mild to moderate sleep apnea or if the patient with severe disease fail positive airway pressure treatment.       The patient was extensively educated on the consequences of untreated obstructive sleep apnea namely cardiovascular/metabolic disorder, neurocognitive deficit, daytime sleepiness, motor vehicle accidents, depression, mood disorders and reduced quality of life.  At " the end of conversation, the patient voices understanding of the disease process and treatment modality.  Patient also understands the risk of untreated obstructive sleep apnea and benefit benefits of the treatment.    Counseling time was greater than 10 minutes.    CT Chest was reviewed.  Pulmonary nodules are noted to be stable. We will plan to repeat CT Chest in one year.    She  is currently following with Oncology in [place] fr B-Cell lymphoma.    Will change her Stiolto to Breztri BID.  Continue albuterol inhaler as needed.    Follow Up   Return in about 6 months (around 10/24/2025).  Patient was given instructions and counseling regarding her condition or for health maintenance advice. Please see specific information pulled into the AVS if appropriate.

## 2025-08-13 ENCOUNTER — CLINICAL SUPPORT NO REQUIREMENTS (OUTPATIENT)
Dept: CARDIOLOGY | Facility: CLINIC | Age: 65
End: 2025-08-13
Payer: MEDICARE

## 2025-08-13 DIAGNOSIS — Z95.0 CARDIAC PACEMAKER IN SITU: Primary | ICD-10-CM
